# Patient Record
Sex: FEMALE | Race: WHITE | Employment: UNEMPLOYED | URBAN - METROPOLITAN AREA
[De-identification: names, ages, dates, MRNs, and addresses within clinical notes are randomized per-mention and may not be internally consistent; named-entity substitution may affect disease eponyms.]

---

## 2013-07-30 LAB — HIV AG/AB: NORMAL

## 2017-03-06 ENCOUNTER — OFFICE VISIT (OUTPATIENT)
Dept: FAMILY MEDICINE CLINIC | Age: 37
End: 2017-03-06

## 2017-03-06 VITALS
BODY MASS INDEX: 16.82 KG/M2 | RESPIRATION RATE: 16 BRPM | DIASTOLIC BLOOD PRESSURE: 74 MMHG | HEIGHT: 62 IN | HEART RATE: 68 BPM | OXYGEN SATURATION: 58 % | WEIGHT: 91.4 LBS | SYSTOLIC BLOOD PRESSURE: 122 MMHG

## 2017-03-06 DIAGNOSIS — M62.838 MUSCLE SPASMS OF NECK: ICD-10-CM

## 2017-03-06 DIAGNOSIS — J30.1 SEASONAL ALLERGIC RHINITIS DUE TO POLLEN: ICD-10-CM

## 2017-03-06 DIAGNOSIS — M62.830 MUSCLE SPASM OF BACK: ICD-10-CM

## 2017-03-06 DIAGNOSIS — F41.9 ANXIETY: Chronic | ICD-10-CM

## 2017-03-06 DIAGNOSIS — F51.04 PSYCHOPHYSIOLOGICAL INSOMNIA: ICD-10-CM

## 2017-03-06 DIAGNOSIS — M54.50 CHRONIC MIDLINE LOW BACK PAIN WITHOUT SCIATICA: ICD-10-CM

## 2017-03-06 DIAGNOSIS — E06.3 CHRONIC LYMPHOCYTIC THYROIDITIS: Chronic | ICD-10-CM

## 2017-03-06 DIAGNOSIS — G89.29 CHRONIC MIDLINE LOW BACK PAIN WITHOUT SCIATICA: ICD-10-CM

## 2017-03-06 DIAGNOSIS — S16.1XXS CERVICAL STRAIN, SEQUELA: ICD-10-CM

## 2017-03-06 LAB — TSH REFLEX: 0.45 UIU/ML (ref 0.27–4.2)

## 2017-03-06 PROCEDURE — 36415 COLL VENOUS BLD VENIPUNCTURE: CPT | Performed by: FAMILY MEDICINE

## 2017-03-06 PROCEDURE — 99213 OFFICE O/P EST LOW 20 MIN: CPT | Performed by: FAMILY MEDICINE

## 2017-03-06 RX ORDER — FLUTICASONE PROPIONATE 50 MCG
SPRAY, SUSPENSION (ML) NASAL
Qty: 1 BOTTLE | Refills: 5 | Status: SHIPPED | OUTPATIENT
Start: 2017-03-06 | End: 2018-02-12 | Stop reason: SDUPTHER

## 2017-03-06 RX ORDER — METHOCARBAMOL 500 MG/1
500 TABLET, FILM COATED ORAL 4 TIMES DAILY PRN
Qty: 40 TABLET | Refills: 0 | Status: SHIPPED | OUTPATIENT
Start: 2017-03-06 | End: 2018-07-31

## 2017-03-06 RX ORDER — ZOLPIDEM TARTRATE 10 MG/1
10 TABLET ORAL NIGHTLY PRN
Qty: 30 TABLET | Refills: 1 | Status: SHIPPED | OUTPATIENT
Start: 2017-03-06 | End: 2017-05-17 | Stop reason: SDUPTHER

## 2017-03-06 RX ORDER — TRAMADOL HYDROCHLORIDE 50 MG/1
50-100 TABLET ORAL EVERY 6 HOURS PRN
Qty: 240 TABLET | Refills: 2 | Status: SHIPPED | OUTPATIENT
Start: 2017-03-06 | End: 2017-05-31 | Stop reason: SDUPTHER

## 2017-03-06 RX ORDER — LEVOTHYROXINE SODIUM 88 UG/1
TABLET ORAL
Qty: 30 TABLET | Refills: 5 | Status: SHIPPED | OUTPATIENT
Start: 2017-03-06 | End: 2017-04-28 | Stop reason: SDUPTHER

## 2017-03-06 RX ORDER — MELOXICAM 15 MG/1
15 TABLET ORAL DAILY
Qty: 30 TABLET | Refills: 2 | Status: SHIPPED | OUTPATIENT
Start: 2017-03-06 | End: 2018-07-31

## 2017-03-06 RX ORDER — ALPRAZOLAM 0.5 MG/1
TABLET ORAL
Qty: 30 TABLET | Refills: 1 | Status: SHIPPED | OUTPATIENT
Start: 2017-03-06 | End: 2017-05-31 | Stop reason: SDUPTHER

## 2017-03-06 RX ORDER — CYCLOBENZAPRINE HCL 5 MG
TABLET ORAL
Qty: 30 TABLET | Refills: 2 | Status: SHIPPED | OUTPATIENT
Start: 2017-03-06 | End: 2018-07-31

## 2017-04-28 DIAGNOSIS — E06.3 CHRONIC LYMPHOCYTIC THYROIDITIS: Chronic | ICD-10-CM

## 2017-04-28 RX ORDER — LEVOTHYROXINE SODIUM 88 UG/1
TABLET ORAL
Qty: 30 TABLET | Refills: 3 | Status: SHIPPED | OUTPATIENT
Start: 2017-04-28 | End: 2017-05-17 | Stop reason: SDUPTHER

## 2017-05-03 ENCOUNTER — TELEPHONE (OUTPATIENT)
Dept: FAMILY MEDICINE CLINIC | Age: 37
End: 2017-05-03

## 2017-05-17 DIAGNOSIS — F51.04 PSYCHOPHYSIOLOGICAL INSOMNIA: ICD-10-CM

## 2017-05-17 DIAGNOSIS — E06.3 CHRONIC LYMPHOCYTIC THYROIDITIS: Chronic | ICD-10-CM

## 2017-05-17 RX ORDER — ZOLPIDEM TARTRATE 10 MG/1
TABLET ORAL
Qty: 30 TABLET | Refills: 0 | Status: SHIPPED | OUTPATIENT
Start: 2017-05-17 | End: 2017-05-31 | Stop reason: SDUPTHER

## 2017-05-17 RX ORDER — LEVOTHYROXINE SODIUM 88 UG/1
TABLET ORAL
Qty: 30 TABLET | Refills: 0 | Status: SHIPPED | OUTPATIENT
Start: 2017-05-17 | End: 2017-05-31 | Stop reason: SDUPTHER

## 2017-05-31 ENCOUNTER — OFFICE VISIT (OUTPATIENT)
Dept: FAMILY MEDICINE CLINIC | Age: 37
End: 2017-05-31

## 2017-05-31 VITALS
DIASTOLIC BLOOD PRESSURE: 70 MMHG | WEIGHT: 90.4 LBS | HEART RATE: 84 BPM | BODY MASS INDEX: 16.63 KG/M2 | HEIGHT: 62 IN | SYSTOLIC BLOOD PRESSURE: 102 MMHG | RESPIRATION RATE: 16 BRPM | OXYGEN SATURATION: 98 %

## 2017-05-31 DIAGNOSIS — E06.3 CHRONIC LYMPHOCYTIC THYROIDITIS: Chronic | ICD-10-CM

## 2017-05-31 DIAGNOSIS — F51.04 PSYCHOPHYSIOLOGICAL INSOMNIA: ICD-10-CM

## 2017-05-31 DIAGNOSIS — Z79.899 LONG-TERM USE OF HIGH-RISK MEDICATION: ICD-10-CM

## 2017-05-31 DIAGNOSIS — F41.9 ANXIETY: Chronic | ICD-10-CM

## 2017-05-31 DIAGNOSIS — S16.1XXS CERVICAL STRAIN, SEQUELA: ICD-10-CM

## 2017-05-31 DIAGNOSIS — Z13.220 SCREENING, LIPID: ICD-10-CM

## 2017-05-31 DIAGNOSIS — M54.50 CHRONIC MIDLINE LOW BACK PAIN WITHOUT SCIATICA: ICD-10-CM

## 2017-05-31 DIAGNOSIS — M62.838 MUSCLE SPASMS OF NECK: ICD-10-CM

## 2017-05-31 DIAGNOSIS — Z00.00 WELL ADULT HEALTH CHECK: Primary | ICD-10-CM

## 2017-05-31 DIAGNOSIS — E55.9 VITAMIN D DEFICIENCY: ICD-10-CM

## 2017-05-31 DIAGNOSIS — M62.830 MUSCLE SPASM OF BACK: ICD-10-CM

## 2017-05-31 DIAGNOSIS — G89.29 CHRONIC MIDLINE LOW BACK PAIN WITHOUT SCIATICA: ICD-10-CM

## 2017-05-31 LAB
CHOLESTEROL, TOTAL: 191 MG/DL (ref 0–199)
HDLC SERPL-MCNC: 85 MG/DL (ref 40–60)
LDL CHOLESTEROL CALCULATED: 96 MG/DL
TRIGL SERPL-MCNC: 50 MG/DL (ref 0–150)
VITAMIN D 25-HYDROXY: 40.2 NG/ML
VLDLC SERPL CALC-MCNC: 10 MG/DL

## 2017-05-31 PROCEDURE — 36415 COLL VENOUS BLD VENIPUNCTURE: CPT | Performed by: FAMILY MEDICINE

## 2017-05-31 PROCEDURE — 99395 PREV VISIT EST AGE 18-39: CPT | Performed by: FAMILY MEDICINE

## 2017-05-31 RX ORDER — ALPRAZOLAM 0.5 MG/1
TABLET ORAL
Qty: 30 TABLET | Refills: 1 | Status: SHIPPED | OUTPATIENT
Start: 2017-05-31 | End: 2017-07-23 | Stop reason: SDUPTHER

## 2017-05-31 RX ORDER — ZOLPIDEM TARTRATE 10 MG/1
TABLET ORAL
Qty: 30 TABLET | Refills: 5 | Status: SHIPPED | OUTPATIENT
Start: 2017-05-31 | End: 2017-11-17 | Stop reason: SDUPTHER

## 2017-05-31 RX ORDER — LEVOTHYROXINE SODIUM 88 UG/1
TABLET ORAL
Qty: 30 TABLET | Refills: 2 | Status: SHIPPED | OUTPATIENT
Start: 2017-05-31 | End: 2017-08-25 | Stop reason: SDUPTHER

## 2017-05-31 RX ORDER — TRAMADOL HYDROCHLORIDE 50 MG/1
50-100 TABLET ORAL EVERY 6 HOURS PRN
Qty: 240 TABLET | Refills: 2 | Status: SHIPPED | OUTPATIENT
Start: 2017-05-31 | End: 2017-08-25 | Stop reason: SDUPTHER

## 2017-05-31 ASSESSMENT — PATIENT HEALTH QUESTIONNAIRE - PHQ9
1. LITTLE INTEREST OR PLEASURE IN DOING THINGS: 0
SUM OF ALL RESPONSES TO PHQ9 QUESTIONS 1 & 2: 0
SUM OF ALL RESPONSES TO PHQ QUESTIONS 1-9: 0
2. FEELING DOWN, DEPRESSED OR HOPELESS: 0

## 2017-06-05 ENCOUNTER — NURSE ONLY (OUTPATIENT)
Dept: FAMILY MEDICINE CLINIC | Age: 37
End: 2017-06-05

## 2017-06-05 DIAGNOSIS — Z79.899 LONG-TERM USE OF HIGH-RISK MEDICATION: Primary | ICD-10-CM

## 2017-06-05 LAB
AMPHETAMINE SCREEN, URINE: ABNORMAL
BARBITURATE SCREEN, URINE: ABNORMAL
BENZODIAZEPINE SCREEN, URINE: ABNORMAL
COCAINE METABOLITE SCREEN URINE: ABNORMAL
MDMA URINE: ABNORMAL
METHADONE SCREEN, URINE: ABNORMAL
METHAMPHETAMINE, URINE: ABNORMAL
OPIATE SCREEN URINE: ABNORMAL
OXYCODONE SCREEN URINE: ABNORMAL
PHENCYCLIDINE SCREEN URINE: ABNORMAL
PROPOXYPHENE SCREEN, URINE: ABNORMAL
THC: ABNORMAL
TRICYCLIC ANTIDEPRESSANTS, UR: ABNORMAL

## 2017-06-05 PROCEDURE — 80305 DRUG TEST PRSMV DIR OPT OBS: CPT | Performed by: FAMILY MEDICINE

## 2017-07-23 DIAGNOSIS — F41.9 ANXIETY: Chronic | ICD-10-CM

## 2017-07-24 RX ORDER — ALPRAZOLAM 0.5 MG/1
TABLET ORAL
Qty: 30 TABLET | Refills: 0 | Status: SHIPPED | OUTPATIENT
Start: 2017-07-24 | End: 2017-08-25 | Stop reason: SDUPTHER

## 2017-08-25 ENCOUNTER — OFFICE VISIT (OUTPATIENT)
Dept: FAMILY MEDICINE CLINIC | Age: 37
End: 2017-08-25

## 2017-08-25 VITALS
OXYGEN SATURATION: 98 % | WEIGHT: 93.4 LBS | RESPIRATION RATE: 16 BRPM | HEIGHT: 62 IN | BODY MASS INDEX: 17.19 KG/M2 | HEART RATE: 95 BPM | DIASTOLIC BLOOD PRESSURE: 64 MMHG | TEMPERATURE: 97.9 F | SYSTOLIC BLOOD PRESSURE: 108 MMHG

## 2017-08-25 DIAGNOSIS — F41.9 ANXIETY: Primary | Chronic | ICD-10-CM

## 2017-08-25 DIAGNOSIS — G89.29 CHRONIC MIDLINE LOW BACK PAIN WITHOUT SCIATICA: ICD-10-CM

## 2017-08-25 DIAGNOSIS — R19.7 DIARRHEA OF PRESUMED INFECTIOUS ORIGIN: ICD-10-CM

## 2017-08-25 DIAGNOSIS — E06.3 CHRONIC LYMPHOCYTIC THYROIDITIS: Chronic | ICD-10-CM

## 2017-08-25 DIAGNOSIS — M62.838 MUSCLE SPASMS OF NECK: ICD-10-CM

## 2017-08-25 DIAGNOSIS — M54.50 CHRONIC MIDLINE LOW BACK PAIN WITHOUT SCIATICA: ICD-10-CM

## 2017-08-25 DIAGNOSIS — M62.830 MUSCLE SPASM OF BACK: ICD-10-CM

## 2017-08-25 DIAGNOSIS — S16.1XXS CERVICAL STRAIN, SEQUELA: ICD-10-CM

## 2017-08-25 LAB
AMPHETAMINE SCREEN, URINE: NORMAL
BARBITURATE SCREEN, URINE: NORMAL
BENZODIAZEPINE SCREEN, URINE: NORMAL
COCAINE METABOLITE SCREEN URINE: NORMAL
MDMA URINE: NORMAL
METHADONE SCREEN, URINE: NORMAL
METHAMPHETAMINE, URINE: NORMAL
OPIATE SCREEN URINE: NORMAL
OXYCODONE SCREEN URINE: NORMAL
PHENCYCLIDINE SCREEN URINE: NORMAL
PROPOXYPHENE SCREEN, URINE: NORMAL
THC: NORMAL
TRICYCLIC ANTIDEPRESSANTS, UR: NORMAL

## 2017-08-25 PROCEDURE — 80305 DRUG TEST PRSMV DIR OPT OBS: CPT | Performed by: FAMILY MEDICINE

## 2017-08-25 PROCEDURE — 99214 OFFICE O/P EST MOD 30 MIN: CPT | Performed by: FAMILY MEDICINE

## 2017-08-25 RX ORDER — LEVOTHYROXINE SODIUM 88 UG/1
TABLET ORAL
Qty: 30 TABLET | Refills: 2 | Status: SHIPPED | OUTPATIENT
Start: 2017-08-25 | End: 2017-10-16 | Stop reason: SDUPTHER

## 2017-08-25 RX ORDER — ALPRAZOLAM 0.5 MG/1
TABLET ORAL
Qty: 30 TABLET | Refills: 2 | Status: SHIPPED | OUTPATIENT
Start: 2017-08-25 | End: 2017-11-17 | Stop reason: SDUPTHER

## 2017-08-25 RX ORDER — TRAMADOL HYDROCHLORIDE 50 MG/1
50-100 TABLET ORAL EVERY 6 HOURS PRN
Qty: 240 TABLET | Refills: 2 | Status: SHIPPED | OUTPATIENT
Start: 2017-08-25 | End: 2017-11-17 | Stop reason: SDUPTHER

## 2017-08-25 RX ORDER — ALPRAZOLAM 0.5 MG/1
TABLET ORAL
Qty: 30 TABLET | Refills: 0 | Status: SHIPPED | OUTPATIENT
Start: 2017-08-25 | End: 2017-08-25

## 2017-10-02 ENCOUNTER — TELEPHONE (OUTPATIENT)
Dept: FAMILY MEDICINE CLINIC | Age: 37
End: 2017-10-02

## 2017-10-11 ENCOUNTER — TELEPHONE (OUTPATIENT)
Dept: FAMILY MEDICINE CLINIC | Age: 37
End: 2017-10-11

## 2017-10-11 DIAGNOSIS — Z78.9 HISTORY OF MEASLES, MUMPS, RUBELLA (MMR) VACCINATION UNKNOWN: Primary | ICD-10-CM

## 2017-10-12 ENCOUNTER — NURSE ONLY (OUTPATIENT)
Dept: FAMILY MEDICINE CLINIC | Age: 37
End: 2017-10-12

## 2017-10-12 DIAGNOSIS — Z78.9 HISTORY OF MEASLES, MUMPS, RUBELLA (MMR) VACCINATION UNKNOWN: ICD-10-CM

## 2017-10-12 LAB — RUBELLA ANTIBODY IGG: 44 IU/ML

## 2017-10-12 NOTE — TELEPHONE ENCOUNTER
If her form says she had an MMR titer drawn and we are waiting for it to come back I will sign that it was drawn. If her form is a history and physical form then we can date with the date she had her history and physical on 5/31/2017. If her form is a preop physical she will need to be seen. 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 Also if the form requires information we don't have such as childhood immunization history she will need to get that for us. If the form is complicated and requires multiple questions that she needs to answer then she will need to be seen. So we need to know what the form is for.

## 2017-10-14 LAB
MUV IGG SER QL: 14.1 AU/ML
RUBEOLA (MEASLES) AB IGG: 13.7 AU/ML

## 2017-10-16 ENCOUNTER — TELEPHONE (OUTPATIENT)
Dept: FAMILY MEDICINE CLINIC | Age: 37
End: 2017-10-16

## 2017-10-16 DIAGNOSIS — E06.3 CHRONIC LYMPHOCYTIC THYROIDITIS: Chronic | ICD-10-CM

## 2017-10-16 RX ORDER — LEVOTHYROXINE SODIUM 88 UG/1
TABLET ORAL
Qty: 30 TABLET | Refills: 1 | Status: SHIPPED | OUTPATIENT
Start: 2017-10-16 | End: 2018-03-12 | Stop reason: SDUPTHER

## 2017-10-16 NOTE — TELEPHONE ENCOUNTER
Pt is calling to get her test results.   She has a form that needs to be filled out    Please fill out and call pt

## 2017-10-18 ENCOUNTER — NURSE ONLY (OUTPATIENT)
Dept: FAMILY MEDICINE CLINIC | Age: 37
End: 2017-10-18

## 2017-10-18 DIAGNOSIS — Z23 NEED FOR MMR VACCINE: Primary | ICD-10-CM

## 2017-10-18 PROCEDURE — 90707 MMR VACCINE SC: CPT | Performed by: FAMILY MEDICINE

## 2017-10-18 PROCEDURE — 90471 IMMUNIZATION ADMIN: CPT | Performed by: FAMILY MEDICINE

## 2017-11-17 ENCOUNTER — OFFICE VISIT (OUTPATIENT)
Dept: FAMILY MEDICINE CLINIC | Age: 37
End: 2017-11-17

## 2017-11-17 VITALS
BODY MASS INDEX: 17.78 KG/M2 | HEART RATE: 84 BPM | DIASTOLIC BLOOD PRESSURE: 62 MMHG | HEIGHT: 62 IN | OXYGEN SATURATION: 98 % | WEIGHT: 96.6 LBS | SYSTOLIC BLOOD PRESSURE: 106 MMHG | RESPIRATION RATE: 16 BRPM

## 2017-11-17 DIAGNOSIS — S16.1XXS CERVICAL STRAIN, SEQUELA: ICD-10-CM

## 2017-11-17 DIAGNOSIS — E06.3 CHRONIC LYMPHOCYTIC THYROIDITIS: Chronic | ICD-10-CM

## 2017-11-17 DIAGNOSIS — M62.838 MUSCLE SPASMS OF NECK: Chronic | ICD-10-CM

## 2017-11-17 DIAGNOSIS — M54.50 CHRONIC MIDLINE LOW BACK PAIN WITHOUT SCIATICA: Primary | ICD-10-CM

## 2017-11-17 DIAGNOSIS — M62.830 MUSCLE SPASM OF BACK: ICD-10-CM

## 2017-11-17 DIAGNOSIS — G89.29 CHRONIC MIDLINE LOW BACK PAIN WITHOUT SCIATICA: Primary | ICD-10-CM

## 2017-11-17 DIAGNOSIS — F51.04 PSYCHOPHYSIOLOGICAL INSOMNIA: ICD-10-CM

## 2017-11-17 DIAGNOSIS — F41.9 ANXIETY: Chronic | ICD-10-CM

## 2017-11-17 DIAGNOSIS — Z79.899 LONG-TERM USE OF HIGH-RISK MEDICATION: ICD-10-CM

## 2017-11-17 PROCEDURE — 80305 DRUG TEST PRSMV DIR OPT OBS: CPT | Performed by: FAMILY MEDICINE

## 2017-11-17 PROCEDURE — 99214 OFFICE O/P EST MOD 30 MIN: CPT | Performed by: FAMILY MEDICINE

## 2017-11-17 RX ORDER — ALPRAZOLAM 0.5 MG/1
TABLET ORAL
Qty: 30 TABLET | Refills: 2 | Status: SHIPPED | OUTPATIENT
Start: 2017-11-17 | End: 2018-02-16 | Stop reason: SDUPTHER

## 2017-11-17 RX ORDER — ZOLPIDEM TARTRATE 10 MG/1
TABLET ORAL
Qty: 30 TABLET | Refills: 5 | Status: SHIPPED | OUTPATIENT
Start: 2017-11-17 | End: 2018-05-01 | Stop reason: SDUPTHER

## 2017-11-17 RX ORDER — TRAMADOL HYDROCHLORIDE 50 MG/1
50-100 TABLET ORAL EVERY 6 HOURS PRN
Qty: 220 TABLET | Refills: 2 | Status: SHIPPED | OUTPATIENT
Start: 2017-11-17 | End: 2018-02-16 | Stop reason: SDUPTHER

## 2017-11-17 NOTE — PROGRESS NOTES
Exam reveals no gallop, no S3, no S4, no distant heart sounds and no friction rub. No murmur heard. Pulmonary/Chest: Effort normal and breath sounds normal. No respiratory distress. She has no decreased breath sounds. She has no wheezes. She has no rhonchi. She has no rales. Abdominal: Soft. She exhibits no distension, no pulsatile midline mass and no mass. There is no hepatosplenomegaly. There is tenderness in the epigastric area. There is no rigidity, no rebound, no guarding, no CVA tenderness, no tenderness at McBurney's point and negative Mccormack's sign. Lymphadenopathy:        Head (right side): No submandibular and no tonsillar adenopathy present. Head (left side): No submandibular and no tonsillar adenopathy present. She has no cervical adenopathy. Right cervical: No superficial cervical, no deep cervical and no posterior cervical adenopathy present. Left cervical: No superficial cervical, no deep cervical and no posterior cervical adenopathy present. Neurological: She is alert. Reflex Scores:       Patellar reflexes are 2+ on the right side and 2+ on the left side. Skin: Skin is warm and dry. She is not diaphoretic. No cyanosis. No pallor. Nails show no clubbing. Psychiatric: She has a normal mood and affect. Her speech is normal and behavior is normal. Judgment normal. Cognition and memory are normal.   Nursing note and vitals reviewed.     Vitals:    11/17/17 1549   BP: 106/62   Site: Right Arm   Position: Sitting   Cuff Size: Medium Adult   Pulse: 84   Resp: 16   SpO2: 98%   Weight: 96 lb 9.6 oz (43.8 kg)   Height: 5' 1.5\" (1.562 m)     BP Readings from Last 3 Encounters:   11/17/17 106/62   08/25/17 108/64   05/31/17 102/70     Pulse Readings from Last 3 Encounters:   11/17/17 84   08/25/17 95   05/31/17 84     Wt Readings from Last 3 Encounters:   11/17/17 96 lb 9.6 oz (43.8 kg)   08/25/17 93 lb 6.4 oz (42.4 kg)   05/31/17 90 lb 6.4 oz (41 kg)     Body mass index is sleeping and she wished to have them try it such as her spouse then she would need to call his doctor and get the okay and then call us and get the okay. After he tried a medicine he would then need to get the medicine from his doctor. She denies sharing this medicine with anyone. She doesn't understand why we are concerned that she says she is only using this medicine every other week and is using the alprazolam every other week and yet she needs a refill with the computer showing she is used up every 30 day refill we've given her.

## 2017-11-17 NOTE — PATIENT INSTRUCTIONS
Connie Tirado was seen today for anxiety. Diagnoses and all orders for this visit:    Muscle spasms of neck  -     traMADol (ULTRAM) 50 MG tablet; Take 1-2 tablets by mouth every 6 hours as needed for Pain . Anxiety  -     ALPRAZolam (XANAX) 0.5 MG tablet; TAKE 1 TABLET BY MOUTH NIGHTLY AS NEEDED FOR SLEEP OR ANXIETY FOR UP TO 30 DAYS. Muscle spasm of back  -     traMADol (ULTRAM) 50 MG tablet; Take 1-2 tablets by mouth every 6 hours as needed for Pain . Chronic lymphocytic thyroiditis  -     TSH with Reflex; Future    Cervical strain, sequela  -     traMADol (ULTRAM) 50 MG tablet; Take 1-2 tablets by mouth every 6 hours as needed for Pain . Chronic midline low back pain without sciatica  -     traMADol (ULTRAM) 50 MG tablet; Take 1-2 tablets by mouth every 6 hours as needed for Pain . Psychophysiological insomnia  -     zolpidem (AMBIEN) 10 MG tablet; TAKE 1 TABLET BY MOUTH EVERY DAY AT BEDTIME AS NEEDED FOR SLEEP.

## 2017-11-17 NOTE — LETTER
reduced coughing, which are especially dangerous for patients with lung disease. Overdose or dangerous interactions with alcohol and other medications may occur, leading to death. Hyperalgesia may develop, in which patients receiving opioids for the treatment of pain may actually become more sensitive to certain painful stimuli, and in some cases, experience pain from ordinarily non-painful stimuli. Women between the ages of 14-53 who could become pregnant should carefully weigh the risks and benefits of opioids with their physicians, as these medications increase the risk of pregnancy complications, including miscarriage,  delivery and stillbirth. It is also possible for babies to be born addicted to opioids. Opioid dependence withdrawal symptoms may include; feelings of uneasiness, increased pain, irritability, belly pain, diarrhea, sweats and goose-flesh. Benzodiazepines and non-benzodiazepine sleep medications: These medications can lead to problems such as addiction/dependence, sedation, fatigue, lightheadedness, dizziness, incoordination, falls, depression, hallucinations, and impaired judgment, memory and concentration. The ability to drive and operate machinery may also be affected. Abnormal sleep-related behaviors have been reported, including sleep walking, driving, making telephone calls, eating, or having sex while not fully awake. These medications can suppress breathing and worsen sleep apnea, particularly when combined with alcohol or other sedating medications, potentially leading to death. Dependence withdrawal symptoms may include tremors, anxiety, hallucinations and seizures. Stimulants:  Common adverse effects include addiction/dependence, increased blood pressure and heart rate, decreased appetite, nausea, involuntary weight loss, insomnia, irritability, and headaches.   These risks may increase when these medications are combined with other stimulants, such as caffeine pills or energy drinks, certain weight loss supplements and oral decongestants. Dependence withdrawal symptoms may include depressed mood, loss of interest, suicidal thoughts, anxiety, fatigue, appetite changes and agitation. Testosterone replacement therapy:  Potential side effects include increased risk of stroke and heart attack, blood clots, increased blood pressure, increased cholesterol, enlarged prostate, sleep apnea, irritability/aggression and other mood disorders, and decreased fertility. Other:     1. I understand that I have the following responsibilities:  · I will take medications at the dose and frequency prescribed. · I will not increase or change how I take my medications without the approval of the health care provider who signs this Medication Agreement. · I will arrange for refills at the prescribed interval ONLY during regular office hours. I will not ask for refills earlier than agreed, after-hours, on holidays or on weekends. · I will obtain all refills for these medications at  ·  [facility] [address], [address] [facility], _____[facility], [address] - F [phone] FOR AMBIEN__________________________  pharmacy (phone number  ·  ________________________), with full consent for my provider and pharmacist to exchange information in writing or verbally. · I will not request any pain medications or controlled substances from other providers and will inform this provider of all other medications I am taking. · I will inform my other health care providers that I am taking these medications and of the existence of this [facility]. In the event of an emergency, I will provide the same information to the emergency department providers. · I will protect my prescriptions and medications.  I understand that lost · I do not show any improvement in pain, or my activity has not improved. · I develop rapid tolerance or loss of improvement, as described in my treatment plan. · I develop significant side effects from the medication. · My behavior is inconsistent with the responsibilities outlined above, which may also result in my being prevented from receiving further care from this office. · Other:____________________________________________________________________    AGREEMENT:    I have read the above and have had all of my questions answered. For chronic disease management, I know that my symptoms can be managed with many types of treatments. A chronic medication trial may be part of my treatment, but I must be an active participant in my care. Medication therapy is only one part of my symptom management plan. In some cases, there may be limited scientific evidence to support the chronic use of certain medications to improve symptoms and daily function. Furthermore, in certain circumstances, there may be scientific information that suggests that use of chronic controlled substances may actually worsen my symptoms and increase my risk of unintentional death directly related to this medication therapy. I know that if my provider feels my risk from controlled medications is greater than my benefit, I will have my controlled substance medication(s) compassionately lowered or removed altogether. I agree to a controlled substance medication trial.      I further agree to allow this office to contact family or friends if there are concerns about my safety and use of the controlled medications. I have agreed to use the following medications above as instructed by my physician and as stated in this [facility] [address].      Patient Signature:  ______________________  Date:11/17/2017 or _____________    Provider Signature:______________________  Date:11/17/2017 or _____________

## 2018-02-12 DIAGNOSIS — M62.830 MUSCLE SPASM OF BACK: ICD-10-CM

## 2018-02-12 DIAGNOSIS — G89.29 CHRONIC MIDLINE LOW BACK PAIN WITHOUT SCIATICA: ICD-10-CM

## 2018-02-12 DIAGNOSIS — M54.50 CHRONIC MIDLINE LOW BACK PAIN WITHOUT SCIATICA: ICD-10-CM

## 2018-02-12 DIAGNOSIS — M62.838 MUSCLE SPASMS OF NECK: Chronic | ICD-10-CM

## 2018-02-12 DIAGNOSIS — S16.1XXS CERVICAL STRAIN, SEQUELA: ICD-10-CM

## 2018-02-12 DIAGNOSIS — J30.1 SEASONAL ALLERGIC RHINITIS DUE TO POLLEN: ICD-10-CM

## 2018-02-12 RX ORDER — TRAMADOL HYDROCHLORIDE 50 MG/1
TABLET ORAL
Qty: 220 TABLET | Refills: 0 | OUTPATIENT
Start: 2018-02-12

## 2018-02-12 RX ORDER — FLUTICASONE PROPIONATE 50 MCG
SPRAY, SUSPENSION (ML) NASAL
Qty: 16 G | Refills: 0 | Status: SHIPPED | OUTPATIENT
Start: 2018-02-12 | End: 2018-03-12 | Stop reason: SDUPTHER

## 2018-02-16 ENCOUNTER — OFFICE VISIT (OUTPATIENT)
Dept: FAMILY MEDICINE CLINIC | Age: 38
End: 2018-02-16

## 2018-02-16 VITALS
HEIGHT: 62 IN | HEART RATE: 112 BPM | DIASTOLIC BLOOD PRESSURE: 76 MMHG | BODY MASS INDEX: 18.26 KG/M2 | RESPIRATION RATE: 16 BRPM | SYSTOLIC BLOOD PRESSURE: 122 MMHG | OXYGEN SATURATION: 98 % | WEIGHT: 99.2 LBS

## 2018-02-16 DIAGNOSIS — M54.41 ACUTE MIDLINE LOW BACK PAIN WITH BILATERAL SCIATICA: Primary | ICD-10-CM

## 2018-02-16 DIAGNOSIS — E06.3 CHRONIC LYMPHOCYTIC THYROIDITIS: Chronic | ICD-10-CM

## 2018-02-16 DIAGNOSIS — M54.42 ACUTE MIDLINE LOW BACK PAIN WITH BILATERAL SCIATICA: Primary | ICD-10-CM

## 2018-02-16 DIAGNOSIS — M62.830 MUSCLE SPASM OF BACK: ICD-10-CM

## 2018-02-16 DIAGNOSIS — F41.9 ANXIETY: Chronic | ICD-10-CM

## 2018-02-16 DIAGNOSIS — M62.838 MUSCLE SPASMS OF NECK: Chronic | ICD-10-CM

## 2018-02-16 DIAGNOSIS — S16.1XXS CERVICAL STRAIN, SEQUELA: ICD-10-CM

## 2018-02-16 DIAGNOSIS — Z79.899 LONG-TERM USE OF HIGH-RISK MEDICATION: ICD-10-CM

## 2018-02-16 LAB
AMPHETAMINE SCREEN, URINE: NORMAL
BARBITURATE SCREEN, URINE: NORMAL
BENZODIAZEPINE SCREEN, URINE: NORMAL
COCAINE METABOLITE SCREEN URINE: NORMAL
MDMA URINE: NORMAL
METHADONE SCREEN, URINE: NORMAL
METHAMPHETAMINE, URINE: NORMAL
OPIATE SCREEN URINE: NORMAL
OXYCODONE SCREEN URINE: NORMAL
PHENCYCLIDINE SCREEN URINE: NORMAL
PROPOXYPHENE SCREEN, URINE: NORMAL
T3 TOTAL: 1.04 NG/ML (ref 0.8–2)
T4 FREE: 1.4 NG/DL (ref 0.9–1.8)
THC: NORMAL
TRICYCLIC ANTIDEPRESSANTS, UR: NORMAL
TSH REFLEX: 0.18 UIU/ML (ref 0.27–4.2)

## 2018-02-16 PROCEDURE — 99215 OFFICE O/P EST HI 40 MIN: CPT | Performed by: FAMILY MEDICINE

## 2018-02-16 PROCEDURE — 80305 DRUG TEST PRSMV DIR OPT OBS: CPT | Performed by: FAMILY MEDICINE

## 2018-02-16 RX ORDER — ALPRAZOLAM 0.5 MG/1
TABLET ORAL
Qty: 30 TABLET | Refills: 2 | Status: SHIPPED | OUTPATIENT
Start: 2018-02-16 | End: 2018-05-01 | Stop reason: SDUPTHER

## 2018-02-16 RX ORDER — TRAMADOL HYDROCHLORIDE 50 MG/1
50-100 TABLET ORAL EVERY 6 HOURS PRN
Qty: 215 TABLET | Refills: 2 | Status: SHIPPED | OUTPATIENT
Start: 2018-02-16 | End: 2018-05-01 | Stop reason: SDUPTHER

## 2018-02-16 ASSESSMENT — ENCOUNTER SYMPTOMS: CONSTIPATION: 1

## 2018-02-16 NOTE — PROGRESS NOTES
Counseling More Than 50% of the 40 min Appointment Time     Luis Terry was seen today for thyroid problem and back pain. Diagnoses and all orders for this visit:    Acute midline low back pain with bilateral sciatica  -     Kelli Liao MD (Inpatient and Outpatient EMG). Explained to the patient what this process was and what was looking for and what it would show  primarily a pinched nerve. Range of motion and knee to chest do every 1-2 hours. Moist heat  Sports cream (Aspercreme doesn't smell). Tylenol 500 mg up to 2 tabs 3x/day as needed. Aleve 220 1-2 twice daily with food. If diagnosed with arthritis in the past:  Glucosamine 1500 mg/ chondroitin 1200 mg once daily or any combination equaling that dose i.e. 750/600 mg twice daily or 500/400 mg three time daily. This is a daily joint/arthritis supplement without significant side effects. B complex vitamin once daily after the EMG. Explained Xanax is not to be used for muscle aches. We'll not increase Xanax to take it for muscle aches and spasms. We need to get off the underlying problem and treat that and not look for stronger controlled substances to hide symptoms. Controlled Substances Monitoring: The Prescription Monitoring Report for this patient was reviewed today. Richard Ackerman DO)  Possible medication side effects, risk of tolerance/dependence & alternative treatments discussed. , Random urine drug screen sent today., No signs of potential drug abuse or diversion identified. Richard Ackerman DO)  Treatment objectives documented - patient is progressing appropriately. , Reviewed the patient's functional status and documentation. Richard Ackerman DO)  Medication contract signed today. Richard Ackerman DO)    Chronic lymphocytic thyroiditis  -     TSH with Reflex; Standing  -     Good control.  -     Continue meds and lifestyle control. Anxiety  Fair control. If needing more medication will will refer to psychiatry.   -     ALPRAZolam

## 2018-02-16 NOTE — LETTER
and sex drive. They may cause problems with breathing, sleep apnea and reduced coughing, which are especially dangerous for patients with lung disease. Overdose or dangerous interactions with alcohol and other medications may occur, leading to death. Hyperalgesia may develop, in which patients receiving opioids for the treatment of pain may actually become more sensitive to certain painful stimuli, and in some cases, experience pain from ordinarily non-painful stimuli. Women between the ages of 14-53 who could become pregnant should carefully weigh the risks and benefits of opioids with their physicians, as these medications increase the risk of pregnancy complications, including miscarriage,  delivery and stillbirth. It is also possible for babies to be born addicted to opioids. Opioid dependence withdrawal symptoms may include; feelings of uneasiness, increased pain, irritability, belly pain, diarrhea, sweats and goose-flesh. Benzodiazepines and non-benzodiazepine sleep medications: These medications can lead to problems such as addiction/dependence, sedation, fatigue, lightheadedness, dizziness, incoordination, falls, depression, hallucinations, and impaired judgment, memory and concentration. The ability to drive and operate machinery may also be affected. Abnormal sleep-related behaviors have been reported, including sleep walking, driving, making telephone calls, eating, or having sex while not fully awake. These medications can suppress breathing and worsen sleep apnea, particularly when combined with alcohol or other sedating medications, potentially leading to death. Dependence withdrawal symptoms may include tremors, anxiety, hallucinations and seizures. Stimulants:  Common adverse effects include addiction/dependence, increased blood pressure and heart rate, decreased appetite, nausea, involuntary weight loss, insomnia, irritability, and headaches.   These risks may increase when these medications are combined with other stimulants, such as caffeine pills or energy drinks, certain weight loss supplements and oral decongestants. Dependence withdrawal symptoms may include depressed mood, loss of interest, suicidal thoughts, anxiety, fatigue, appetite changes and agitation. Testosterone replacement therapy:  Potential side effects include increased risk of stroke and heart attack, blood clots, increased blood pressure, increased cholesterol, enlarged prostate, sleep apnea, irritability/aggression and other mood disorders, and decreased fertility. Other:     1. I understand that I have the following responsibilities:  · I will take medications at the dose and frequency prescribed. · I will not increase or change how I take my medications without the approval of the health care provider who signs this Medication Agreement. · I will arrange for refills at the prescribed interval ONLY during regular office hours. I will not ask for refills earlier than agreed, after-hours, on holidays or on weekends. · I will obtain all refills for these medications at  ·  ____________________________________  pharmacy (phone number  ·  ________________________), with full consent for my provider and pharmacist to exchange information in writing or verbally. · I will not request any pain medications or controlled substances from other providers and will inform this provider of all other medications I am taking. · I will inform my other health care providers that I am taking these medications and of the existence of this [facility] [address]. In the event of an emergency, I will provide the same information to the emergency department providers. · I will protect my prescriptions and medications. I understand that lost or misplaced prescriptions will not be replaced.   · I will keep medications only for my own use and will not share them with · I develop significant side effects from the medication. · My behavior is inconsistent with the responsibilities outlined above, which may also result in my being prevented from receiving further care from this office. · Other:____________________________________________________________________    AGREEMENT:    I have read the above and have had all of my questions answered. For chronic disease management, I know that my symptoms can be managed with many types of treatments. A chronic medication trial may be part of my treatment, but I must be an active participant in my care. Medication therapy is only one part of my symptom management plan. In some cases, there may be limited scientific evidence to support the chronic use of certain medications to improve symptoms and daily function. Furthermore, in certain circumstances, there may be scientific information that suggests that use of chronic controlled substances may actually worsen my symptoms and increase my risk of unintentional death directly related to this medication therapy. I know that if my provider feels my risk from controlled medications is greater than my benefit, I will have my controlled substance medication(s) compassionately lowered or removed altogether. I agree to a controlled substance medication trial.      I further agree to allow this office to contact family or friends if there are concerns about my safety and use of the controlled medications. I have agreed to use the following medications above as instructed by my physician and as stated in this [facility] [address].      Patient Signature:  ______________________  Date:2/16/2018 or _____________    Provider Signature:______________________  Date:2/16/2018 or _____________

## 2018-03-12 ENCOUNTER — TELEPHONE (OUTPATIENT)
Dept: FAMILY MEDICINE CLINIC | Age: 38
End: 2018-03-12

## 2018-03-12 DIAGNOSIS — J30.1 SEASONAL ALLERGIC RHINITIS DUE TO POLLEN: ICD-10-CM

## 2018-03-12 DIAGNOSIS — E06.3 CHRONIC LYMPHOCYTIC THYROIDITIS: Chronic | ICD-10-CM

## 2018-03-13 RX ORDER — LEVOTHYROXINE SODIUM 88 UG/1
TABLET ORAL
Qty: 30 TABLET | Refills: 1 | Status: SHIPPED | OUTPATIENT
Start: 2018-03-13 | End: 2018-05-01 | Stop reason: SDUPTHER

## 2018-03-13 RX ORDER — FLUTICASONE PROPIONATE 50 MCG
SPRAY, SUSPENSION (ML) NASAL
Qty: 1 BOTTLE | Refills: 11 | Status: SHIPPED | OUTPATIENT
Start: 2018-03-13 | End: 2018-11-27 | Stop reason: SDUPTHER

## 2018-03-15 ENCOUNTER — PROCEDURE VISIT (OUTPATIENT)
Dept: NEUROLOGY | Age: 38
End: 2018-03-15

## 2018-03-15 DIAGNOSIS — M79.605 BILATERAL LEG PAIN: Primary | ICD-10-CM

## 2018-03-15 DIAGNOSIS — M79.604 BILATERAL LEG PAIN: Primary | ICD-10-CM

## 2018-03-15 PROCEDURE — 95909 NRV CNDJ TST 5-6 STUDIES: CPT | Performed by: PSYCHIATRY & NEUROLOGY

## 2018-03-15 PROCEDURE — 95886 MUSC TEST DONE W/N TEST COMP: CPT | Performed by: PSYCHIATRY & NEUROLOGY

## 2018-03-15 NOTE — PROGRESS NOTES
Smiley Mckinnon M.D. [facility]) Physicians/[facility]  Board Certified in [address] [facility] [address] [place] [address], [address] [facility] [address] [facility]    EMG / NERVE CONDUCTION STUDY    PATIENT:     Ivonne See OF EMG:    3/15/2018    YOB: 1980       REASON FOR EMG:   Low back pain and bilateral leg pain, rule out lumbar radiculopathy     REFERRING PHYSICIAN:  DO Margarette Hubbard 15  [facility], [address]    SUMMARY:  Bilateral peroneal motor nerve studies were normal.  Bilateral posterior tibial motor nerve studies were normal.  Bilateral sural sensory nerve studies were normal.  Needle EMG of several muscles in both lower extremities was normal.  Needle EMG of bilateral lumbar paraspinal muscles was normal.     CLINICAL DIAGNOSIS:  Lumbar radiculopathy     EMG RESULTS:   This is a normal EMG and nerve conduction study of both lower extremities. There is no electrophysiological evidence for lumbar radiculopathy or peripheral neuropathy in this study. _____________________________  Smiley Mckinnon M.D.   Electromyographer/Neurologist

## 2018-04-02 ENCOUNTER — TELEPHONE (OUTPATIENT)
Dept: FAMILY MEDICINE CLINIC | Age: 38
End: 2018-04-02

## 2018-04-05 ENCOUNTER — TELEPHONE (OUTPATIENT)
Dept: FAMILY MEDICINE CLINIC | Age: 38
End: 2018-04-05

## 2018-05-01 ENCOUNTER — OFFICE VISIT (OUTPATIENT)
Dept: FAMILY MEDICINE CLINIC | Age: 38
End: 2018-05-01

## 2018-05-01 VITALS
HEIGHT: 62 IN | OXYGEN SATURATION: 98 % | DIASTOLIC BLOOD PRESSURE: 74 MMHG | SYSTOLIC BLOOD PRESSURE: 108 MMHG | HEART RATE: 80 BPM | BODY MASS INDEX: 18.62 KG/M2 | RESPIRATION RATE: 18 BRPM | WEIGHT: 101.2 LBS

## 2018-05-01 DIAGNOSIS — M62.830 MUSCLE SPASM OF BACK: ICD-10-CM

## 2018-05-01 DIAGNOSIS — F51.04 PSYCHOPHYSIOLOGICAL INSOMNIA: ICD-10-CM

## 2018-05-01 DIAGNOSIS — M62.838 MUSCLE SPASMS OF NECK: Chronic | ICD-10-CM

## 2018-05-01 DIAGNOSIS — S16.1XXS CERVICAL STRAIN, SEQUELA: ICD-10-CM

## 2018-05-01 DIAGNOSIS — M79.18 BUTTOCK PAIN: ICD-10-CM

## 2018-05-01 DIAGNOSIS — G89.29 CHRONIC MIDLINE LOW BACK PAIN WITH BILATERAL SCIATICA: ICD-10-CM

## 2018-05-01 DIAGNOSIS — M54.42 CHRONIC MIDLINE LOW BACK PAIN WITH BILATERAL SCIATICA: ICD-10-CM

## 2018-05-01 DIAGNOSIS — E06.3 CHRONIC LYMPHOCYTIC THYROIDITIS: Primary | Chronic | ICD-10-CM

## 2018-05-01 DIAGNOSIS — F41.9 ANXIETY: Chronic | ICD-10-CM

## 2018-05-01 DIAGNOSIS — M54.41 CHRONIC MIDLINE LOW BACK PAIN WITH BILATERAL SCIATICA: ICD-10-CM

## 2018-05-01 PROCEDURE — 99214 OFFICE O/P EST MOD 30 MIN: CPT | Performed by: FAMILY MEDICINE

## 2018-05-01 RX ORDER — LEVOTHYROXINE SODIUM 0.07 MG/1
TABLET ORAL
Qty: 30 TABLET | Refills: 2 | Status: SHIPPED | OUTPATIENT
Start: 2018-05-01 | End: 2018-06-26 | Stop reason: SDUPTHER

## 2018-05-01 RX ORDER — ALPRAZOLAM 0.5 MG/1
TABLET ORAL
Qty: 30 TABLET | Refills: 2 | Status: SHIPPED | OUTPATIENT
Start: 2018-05-16 | End: 2018-07-31 | Stop reason: SDUPTHER

## 2018-05-01 RX ORDER — ZOLPIDEM TARTRATE 10 MG/1
TABLET ORAL
Qty: 30 TABLET | Refills: 5 | Status: SHIPPED | OUTPATIENT
Start: 2018-05-01 | End: 2018-11-09 | Stop reason: SDUPTHER

## 2018-05-01 RX ORDER — TRAMADOL HYDROCHLORIDE 50 MG/1
50-100 TABLET ORAL EVERY 6 HOURS PRN
Qty: 215 TABLET | Refills: 2 | Status: SHIPPED | OUTPATIENT
Start: 2018-05-16 | End: 2018-07-31 | Stop reason: SDUPTHER

## 2018-06-13 ENCOUNTER — TELEPHONE (OUTPATIENT)
Dept: FAMILY MEDICINE CLINIC | Age: 38
End: 2018-06-13

## 2018-06-26 DIAGNOSIS — E06.3 CHRONIC LYMPHOCYTIC THYROIDITIS: Chronic | ICD-10-CM

## 2018-06-26 RX ORDER — LEVOTHYROXINE SODIUM 0.07 MG/1
TABLET ORAL
Qty: 30 TABLET | Refills: 0 | Status: SHIPPED | OUTPATIENT
Start: 2018-06-26 | End: 2018-08-09 | Stop reason: SDUPTHER

## 2018-07-10 ENCOUNTER — TELEPHONE (OUTPATIENT)
Dept: PRIMARY CARE CLINIC | Age: 38
End: 2018-07-10

## 2018-07-10 NOTE — TELEPHONE ENCOUNTER
Received a PA request for TraMADol HCl 50MG tablets. Patients insurance is coming up invalid. Please verify insurance. Please advise. Thank you.

## 2018-07-31 ENCOUNTER — OFFICE VISIT (OUTPATIENT)
Dept: FAMILY MEDICINE CLINIC | Age: 38
End: 2018-07-31

## 2018-07-31 VITALS
HEART RATE: 64 BPM | DIASTOLIC BLOOD PRESSURE: 64 MMHG | RESPIRATION RATE: 17 BRPM | BODY MASS INDEX: 18.26 KG/M2 | HEIGHT: 62 IN | SYSTOLIC BLOOD PRESSURE: 126 MMHG | OXYGEN SATURATION: 97 % | WEIGHT: 99.2 LBS

## 2018-07-31 DIAGNOSIS — Z79.899 LONG-TERM USE OF HIGH-RISK MEDICATION: ICD-10-CM

## 2018-07-31 DIAGNOSIS — M54.50 CHRONIC BILATERAL LOW BACK PAIN WITHOUT SCIATICA: ICD-10-CM

## 2018-07-31 DIAGNOSIS — G89.29 CHRONIC BILATERAL LOW BACK PAIN WITHOUT SCIATICA: ICD-10-CM

## 2018-07-31 DIAGNOSIS — F41.9 ANXIETY: Primary | Chronic | ICD-10-CM

## 2018-07-31 PROCEDURE — 80305 DRUG TEST PRSMV DIR OPT OBS: CPT | Performed by: FAMILY MEDICINE

## 2018-07-31 PROCEDURE — 99215 OFFICE O/P EST HI 40 MIN: CPT | Performed by: FAMILY MEDICINE

## 2018-07-31 RX ORDER — TRAMADOL HYDROCHLORIDE 50 MG/1
50-100 TABLET ORAL EVERY 6 HOURS PRN
Qty: 210 TABLET | Refills: 2 | Status: SHIPPED | OUTPATIENT
Start: 2018-07-31 | End: 2018-10-29

## 2018-07-31 RX ORDER — ALPRAZOLAM 0.5 MG/1
TABLET ORAL
Qty: 40 TABLET | Refills: 1 | Status: SHIPPED | OUTPATIENT
Start: 2018-07-31 | End: 2018-10-11 | Stop reason: SDUPTHER

## 2018-07-31 RX ORDER — AMITRIPTYLINE HYDROCHLORIDE 10 MG/1
10 TABLET, FILM COATED ORAL 2 TIMES DAILY
Qty: 60 TABLET | Refills: 2 | Status: SHIPPED | OUTPATIENT
Start: 2018-07-31 | End: 2018-10-11

## 2018-07-31 NOTE — PROGRESS NOTES
05/01/18 101 lb 3.2 oz (45.9 kg)   02/16/18 99 lb 3.2 oz (45 kg)     Body mass index is 18.44 kg/m². Results for POC orders placed in visit on 07/31/18   POCT Rapid Drug Screen   Result Value Ref Range    Amphetamine Screen, Urine neg     Barbiturate Screen, Urine NEG     Benzodiazepine Screen, Urine POS     Buprenorphine Urine NEG     Cocaine Metabolite Screen, Urine NEG     Gabapentin Screen, Urine NEG     Methamphetamine, Urine NEG     Methadone Screen, Urine NEG     Opiate Scrn, Ur NEG     Oxycodone Screen, Ur POS     PCP Screen, Urine NEG     Propoxyphene Screen, Urine NEG     THC Screen, Urine NEG     Tricyclic Antidepressants, Urine NEG      MRI of the lumbosacral spine 4/6/2018 [facility])  This shows minimal dextroconvex scoliosis. She has a transitional lumbar sacral anatomy. The lumbarization is on the right. Bones: No displaced fracture. No loss of vertebral body height. No aggressive osseous lesion. No evidence of stress reaction. The sacrum and sacral iliac joints are unremarkable. Cord: Dr. Mandy Guzman medullaris terminates at the L1-L2 level. No abnormal or nerve root signal. No syringohydromyelia. T12L1: Unremarkable without evidence of spinal canal or neural foraminal narrowing. L1-L2: Unremarkable without evidence of spinal canal or neural foraminal narrowing. L2-L3: Unremarkable without evidence of spinal canal or neural foraminal narrowing. L3-L4: Unremarkable without evidence of spinal canal or neural foraminal narrowing. L4L5:  Tiny circumferential disc bulge. Minimal facet arthropathy. No spinal canal or neural foraminal narrowing. L5S1:  Disc desiccation. Small circumferential disc bulge. Small central annular tear and central disc protrusion. Contact of the bilateral descending nerve roots in the lateral recesses without evidence of displacement. Bilateral neural foraminal narrowing. Impression:  1.   Transitional lumbar sacral anatomy numbering as described above.  2.  Multilevel degenerative disc disease worst at L5-S1 level where there is a central annular tear and disc protrusion which contacts but does not displace the bilateral descending nerve roots. Borderline bilateral neural foraminal narrowing at the L5-S1 level. Assessment:      1. Anxiety    2. Chronic bilateral low back pain without sciatica    3. Long-term use of high-risk medication          Plan:       - Counseling More Than 50% of the 40 min Appointment Time     Macario Martinez was seen today for leg pain, back pain and hip pain. Diagnoses and all orders for this visit:    Anxiety  -     ALPRAZolam (XANAX) 0.5 MG tablet; TAKE 1 TABLET BY MOUTH NIGHTLY AND AS NEEDED FOR SLEEP OR  ANXIETY FOR UP TO 30 DAYS. -     amitriptyline (ELAVIL) 10 MG tablet; Take 1 tablet by mouth 2 times daily  -     Ambulatory referral to Psychiatry. Explained I will not continue to write for her to have alprazolam indefinitely. She needs to immediately schedule an appointment with the psychiatric nurse practitioner in order to get better control of her anxiety. Because it takes more time than we have in the 25 minute appointment we will not be able to treat with her anxiety in the future. She needs to schedule    Chronic bilateral low back pain without sciatica  -     traMADol (ULTRAM) 50 MG tablet; Take 1-2 tablets by mouth every 6 hours as needed for Pain for up to 90 days. .  -     XR SACRUM COCCYX (MIN 2 VIEWS); Future  -     amitriptyline (ELAVIL) 10 MG tablet; Take 1 tablet by mouth 2 times daily  SEE ORTHO.   Explained to patient that when we send her to a specialist because we are not sure what going on or have treated and she returns to us insists that we do the testing to confirm a diagnosis that a specialist could not determine it is not logical.  Had we known what the next step was we would've simply done that step to begin with and not sent her to a specialist.  We asked her to see Dr. Joseph Bookbinder again  Will continue to

## 2018-08-01 LAB

## 2018-08-02 ENCOUNTER — TELEPHONE (OUTPATIENT)
Dept: FAMILY MEDICINE CLINIC | Age: 38
End: 2018-08-02

## 2018-08-02 DIAGNOSIS — M25.50 ARTHRALGIA, UNSPECIFIED JOINT: ICD-10-CM

## 2018-08-02 DIAGNOSIS — E78.00 HYPERCHOLESTEROLEMIA: Primary | Chronic | ICD-10-CM

## 2018-08-02 DIAGNOSIS — M79.10 MYALGIA: ICD-10-CM

## 2018-08-02 DIAGNOSIS — E55.9 VITAMIN D DEFICIENCY: Chronic | ICD-10-CM

## 2018-08-03 ENCOUNTER — NURSE ONLY (OUTPATIENT)
Dept: FAMILY MEDICINE CLINIC | Age: 38
End: 2018-08-03

## 2018-08-03 DIAGNOSIS — M79.18 BUTTOCK PAIN: ICD-10-CM

## 2018-08-03 DIAGNOSIS — E06.3 CHRONIC LYMPHOCYTIC THYROIDITIS: Chronic | ICD-10-CM

## 2018-08-03 DIAGNOSIS — E78.00 HYPERCHOLESTEROLEMIA: Chronic | ICD-10-CM

## 2018-08-03 DIAGNOSIS — E55.9 VITAMIN D DEFICIENCY: Chronic | ICD-10-CM

## 2018-08-03 DIAGNOSIS — M25.50 ARTHRALGIA, UNSPECIFIED JOINT: ICD-10-CM

## 2018-08-03 DIAGNOSIS — M79.10 MYALGIA: ICD-10-CM

## 2018-08-03 LAB
BASOPHILS ABSOLUTE: 0.1 K/UL (ref 0–0.2)
BASOPHILS RELATIVE PERCENT: 0.9 %
C-REACTIVE PROTEIN: 0.8 MG/L (ref 0–5.1)
CHOLESTEROL, FASTING: 204 MG/DL (ref 0–199)
EOSINOPHILS ABSOLUTE: 0.2 K/UL (ref 0–0.6)
EOSINOPHILS RELATIVE PERCENT: 3.2 %
GLUCOSE FASTING: 109 MG/DL (ref 70–99)
HCT VFR BLD CALC: 38.8 % (ref 36–48)
HDLC SERPL-MCNC: 80 MG/DL (ref 40–60)
HEMOGLOBIN: 13 G/DL (ref 12–16)
LDL CHOLESTEROL CALCULATED: 114 MG/DL
LYMPHOCYTES ABSOLUTE: 1.8 K/UL (ref 1–5.1)
LYMPHOCYTES RELATIVE PERCENT: 26.8 %
MCH RBC QN AUTO: 31.4 PG (ref 26–34)
MCHC RBC AUTO-ENTMCNC: 33.4 G/DL (ref 31–36)
MCV RBC AUTO: 93.9 FL (ref 80–100)
MONOCYTES ABSOLUTE: 0.5 K/UL (ref 0–1.3)
MONOCYTES RELATIVE PERCENT: 7.7 %
NEUTROPHILS ABSOLUTE: 4 K/UL (ref 1.7–7.7)
NEUTROPHILS RELATIVE PERCENT: 61.4 %
PDW BLD-RTO: 12.5 % (ref 12.4–15.4)
PLATELET # BLD: 323 K/UL (ref 135–450)
PMV BLD AUTO: 8.6 FL (ref 5–10.5)
RBC # BLD: 4.13 M/UL (ref 4–5.2)
SEDIMENTATION RATE, ERYTHROCYTE: 9 MM/HR (ref 0–20)
TOTAL CK: 54 U/L (ref 26–192)
TRIGLYCERIDE, FASTING: 51 MG/DL (ref 0–150)
TSH REFLEX: 2.2 UIU/ML (ref 0.27–4.2)
VITAMIN D 25-HYDROXY: 29.6 NG/ML
VLDLC SERPL CALC-MCNC: 10 MG/DL
WBC # BLD: 6.6 K/UL (ref 4–11)

## 2018-08-03 PROCEDURE — 36415 COLL VENOUS BLD VENIPUNCTURE: CPT | Performed by: FAMILY MEDICINE

## 2018-08-05 LAB
ANA INTERPRETATION: NORMAL
ANTI-NUCLEAR ANTIBODY (ANA): NEGATIVE

## 2018-08-07 PROBLEM — Z79.899 LONG-TERM USE OF HIGH-RISK MEDICATION: Status: ACTIVE | Noted: 2018-08-07

## 2018-08-07 PROBLEM — M54.50 CHRONIC BILATERAL LOW BACK PAIN WITHOUT SCIATICA: Chronic | Status: ACTIVE | Noted: 2018-08-07

## 2018-08-07 PROBLEM — M54.50 CHRONIC BILATERAL LOW BACK PAIN WITHOUT SCIATICA: Status: ACTIVE | Noted: 2018-08-07

## 2018-08-07 PROBLEM — G89.29 CHRONIC BILATERAL LOW BACK PAIN WITHOUT SCIATICA: Status: ACTIVE | Noted: 2018-08-07

## 2018-08-07 PROBLEM — G89.29 CHRONIC BILATERAL LOW BACK PAIN WITHOUT SCIATICA: Chronic | Status: ACTIVE | Noted: 2018-08-07

## 2018-08-09 ENCOUNTER — TELEPHONE (OUTPATIENT)
Dept: FAMILY MEDICINE CLINIC | Age: 38
End: 2018-08-09

## 2018-08-09 DIAGNOSIS — E06.3 CHRONIC LYMPHOCYTIC THYROIDITIS: Chronic | ICD-10-CM

## 2018-08-09 RX ORDER — LEVOTHYROXINE SODIUM 0.07 MG/1
TABLET ORAL
Qty: 30 TABLET | Refills: 2 | Status: SHIPPED | OUTPATIENT
Start: 2018-08-09 | End: 2018-11-01 | Stop reason: SDUPTHER

## 2018-08-09 NOTE — TELEPHONE ENCOUNTER
I spoke to Yue President today- she said she has not taken any opiates. Only tramadol, and tramadol doesn't always show up in a drug screen. It does sometimes show up in our drug screens as TCA. Yue President said she doesn't understand why Dr. Amparo Byrd didn't order her lab work she asked for, or why he said he was sending urine out, and it was not sent out. Do you (Dr. Amparo Byrd) want her to come in for another drug screen? And could you please send in her prescription for thyroid meds to [facility] in her chart?

## 2018-08-09 NOTE — TELEPHONE ENCOUNTER
Notes recorded by Davonte Roland DO on 8/7/2018 at 9:47 PM EDT  Random drug screen shows oxycodone which we did not prescribe patient nor did she report to us. When did she take oxycodone?  All testing for infections was normal negative (CRP, CBC, sedimentation rate).  The CRP which is the most sensitive blood test for an infection was 0.8 normal is 0-5.1 and the lower number at the less likely there is an infection. .  Screening tests for lupus and other autoimmune disorders that cause arthritis or all negative (CRP, sedimentation rate, ADRIEN). CK is a test for muscle breakdown which can cause diffuse muscle pain and that test was normal. Thyroid test was normal this time.  Glucose level was slightly elevated at 109.  Prediabetes does not begin until 115 and diabetes until 126. Vitamin D level was slightly low at 29 0.6. If you're having muscle aches and fatigue you want the vitamin D level at the high end of normal which is 50-80.  I would recommend 4000 international units daily. Is easy to find 2000 international units you can take 2 tabs once  daily for ease of remembering. Your LDL bad cholesterol is slightly high but this is offset by your HDL good cholesterol which is very high.  There is no need for cholesterol medicine at this time. CALLED PT AND GAVE HER ENTIRE MESSAGE ABOVE. SHE IS VERY UPSET OF THE UDS RESULTS SHE SAID I DO NOT TAKE ANYTHING LIKE THAT AND SHE ONLY TAKES TRAMADOL. AND XANAX SHE ALSO STATED HER NAME WAS NOT ON THAT CUP AND THERE WERE SEVERAL CUPS ON THE COUNTER THAT LOOKED LIKE HERS. SHE STATED ALANIS GAVE ME THE RESULTS NEVER TELLING HER THAT IT WAS POS FOR OXY. AND ALANIS TOLD HER THAT HER TRAMADOL WAS POSITIVE IN HER UDS, TRAMADOL DOES NOT SHOW UP IN A POCT RANDOM DRUG SCREEN. SHE STATED SHE HAS BEEN GIVEN A BUNCH OF GRIEF OVER HER UDS BEING IN CONSISTENT BECAUSE TRAMADOL NEVER SHOWS UP.  SHE SAID SHE WAS TOLD BY DR. Stephanie Connor HER URINE WAS SENT OUT A FEW MONTHS AGO AND IT WAS NEVER

## 2018-08-15 ENCOUNTER — OFFICE VISIT (OUTPATIENT)
Dept: NEUROLOGY | Age: 38
End: 2018-08-15

## 2018-08-15 VITALS
HEIGHT: 60 IN | DIASTOLIC BLOOD PRESSURE: 82 MMHG | BODY MASS INDEX: 19.44 KG/M2 | WEIGHT: 99 LBS | HEART RATE: 77 BPM | SYSTOLIC BLOOD PRESSURE: 121 MMHG

## 2018-08-15 DIAGNOSIS — M25.551 BILATERAL HIP PAIN: ICD-10-CM

## 2018-08-15 DIAGNOSIS — M79.605 BILATERAL LEG PAIN: Primary | ICD-10-CM

## 2018-08-15 DIAGNOSIS — M79.604 BILATERAL LEG PAIN: Primary | ICD-10-CM

## 2018-08-15 DIAGNOSIS — H53.8 BLURRED VISION: ICD-10-CM

## 2018-08-15 DIAGNOSIS — M25.552 BILATERAL HIP PAIN: ICD-10-CM

## 2018-08-15 DIAGNOSIS — R20.0 BILATERAL LEG NUMBNESS: ICD-10-CM

## 2018-08-15 PROCEDURE — 99244 OFF/OP CNSLTJ NEW/EST MOD 40: CPT | Performed by: PSYCHIATRY & NEUROLOGY

## 2018-08-15 NOTE — LETTER
Mental Status Exam: Patient is alert, oriented to person, place and time. Recent and remote memory is normal  Fund of Knowledge is normal  Attention/concentration is normal.   Speech : No dysarthria  Language : No aphasia  Funduscopic Exam: sharp disc margins  Cranial Nerves:   II: Visual fields:  Full to confrontation  III: Pupils:  equal, round, reactive to light  III,IV,VI: Extra Ocular Movements are intact. No nystagmus  V: Facial sensation is intact to pin prick and light touch  VII: Facial strength and movements: intact and symmetric smile,cheek puffing and eyebrow elevation  VIII: Hearing:  Intact to finger rub bilaterally  IX: Palate  elevation is symmetric  XI: Shoulder shrug is intact  XII: Tongue movements are normal  Motor:  Muscle tone and bulk are normal.   Strength is symmetrical 5/5 in all four extremities. Sensory: Intact to light touch and  pin prick in all four extremities  Coordination:  Normal  Finger to Nose and Heel to Shin bilaterally    . Reflexes:  DTR +2 and symmetric bilaterally  Plantar response: Flexor bilaterally  Gait: Gait and station is normal. Patient can toe/ heel and tandem walk without difficulty  Romberg: negative  Vascular: No carotid bruit bilaterally        DATA:  LABS:  General Labs:    CBC:   Lab Results   Component Value Date    WBC 6.6 08/03/2018    RBC 4.13 08/03/2018    HGB 13.0 08/03/2018    HCT 38.8 08/03/2018    MCV 93.9 08/03/2018    MCH 31.4 08/03/2018    MCHC 33.4 08/03/2018    RDW 12.5 08/03/2018     08/03/2018    MPV 8.6 08/03/2018     BMP:    Lab Results   Component Value Date     04/14/2016    K 4.6 04/14/2016     04/14/2016    CO2 23 04/14/2016    BUN 11 04/14/2016    LABALBU 4.4 04/14/2016    CREATININE 0.5 04/14/2016    CALCIUM 9.9 04/14/2016    GFRAA >60 04/14/2016    GFRAA >60 05/21/2013    LABGLOM >60 04/14/2016    GLUCOSE 96 04/14/2016       IMPRESSION :  Patient comes with severe bilateral hip pain and some leg numbness.   She

## 2018-08-31 ENCOUNTER — TELEPHONE (OUTPATIENT)
Dept: FAMILY MEDICINE CLINIC | Age: 38
End: 2018-08-31

## 2018-09-10 NOTE — TELEPHONE ENCOUNTER
Checked prior refills. Patient was given #40 per month with one refill on 7/31/2018. She'll be due therefore on 10/1/18. We will give her sufficient to last until 11/15/18  when her appointment with Ilda Holter CNP -psych.

## 2018-10-11 ENCOUNTER — OFFICE VISIT (OUTPATIENT)
Dept: PSYCHIATRY | Age: 38
End: 2018-10-11
Payer: COMMERCIAL

## 2018-10-11 VITALS
WEIGHT: 106.6 LBS | SYSTOLIC BLOOD PRESSURE: 110 MMHG | HEART RATE: 64 BPM | DIASTOLIC BLOOD PRESSURE: 80 MMHG | BODY MASS INDEX: 17.76 KG/M2 | HEIGHT: 65 IN

## 2018-10-11 DIAGNOSIS — F33.1 MAJOR DEPRESSIVE DISORDER, RECURRENT EPISODE, MODERATE (HCC): ICD-10-CM

## 2018-10-11 DIAGNOSIS — F41.9 ANXIETY: Primary | Chronic | ICD-10-CM

## 2018-10-11 DIAGNOSIS — F42.2 MIXED OBSESSIONAL THOUGHTS AND ACTS: ICD-10-CM

## 2018-10-11 PROCEDURE — 99204 OFFICE O/P NEW MOD 45 MIN: CPT | Performed by: NURSE PRACTITIONER

## 2018-10-11 RX ORDER — ALPRAZOLAM 0.5 MG/1
TABLET ORAL
Qty: 30 TABLET | Refills: 0 | Status: SHIPPED | OUTPATIENT
Start: 2018-10-18 | End: 2018-11-09 | Stop reason: SDUPTHER

## 2018-10-11 ASSESSMENT — PATIENT HEALTH QUESTIONNAIRE - PHQ9
4. FEELING TIRED OR HAVING LITTLE ENERGY: 2
7. TROUBLE CONCENTRATING ON THINGS, SUCH AS READING THE NEWSPAPER OR WATCHING TELEVISION: 2
1. LITTLE INTEREST OR PLEASURE IN DOING THINGS: 3
SUM OF ALL RESPONSES TO PHQ9 QUESTIONS 1 & 2: 5
SUM OF ALL RESPONSES TO PHQ QUESTIONS 1-9: 18
3. TROUBLE FALLING OR STAYING ASLEEP: 3
SUM OF ALL RESPONSES TO PHQ QUESTIONS 1-9: 18
6. FEELING BAD ABOUT YOURSELF - OR THAT YOU ARE A FAILURE OR HAVE LET YOURSELF OR YOUR FAMILY DOWN: 3
9. THOUGHTS THAT YOU WOULD BE BETTER OFF DEAD, OR OF HURTING YOURSELF: 0
2. FEELING DOWN, DEPRESSED OR HOPELESS: 2
5. POOR APPETITE OR OVEREATING: 3
10. IF YOU CHECKED OFF ANY PROBLEMS, HOW DIFFICULT HAVE THESE PROBLEMS MADE IT FOR YOU TO DO YOUR WORK, TAKE CARE OF THINGS AT HOME, OR GET ALONG WITH OTHER PEOPLE: 1
8. MOVING OR SPEAKING SO SLOWLY THAT OTHER PEOPLE COULD HAVE NOTICED. OR THE OPPOSITE, BEING SO FIGETY OR RESTLESS THAT YOU HAVE BEEN MOVING AROUND A LOT MORE THAN USUAL: 0

## 2018-10-11 ASSESSMENT — ANXIETY QUESTIONNAIRES
2. NOT BEING ABLE TO STOP OR CONTROL WORRYING: 3-NEARLY EVERY DAY
GAD7 TOTAL SCORE: 18
5. BEING SO RESTLESS THAT IT IS HARD TO SIT STILL: 1-SEVERAL DAYS
7. FEELING AFRAID AS IF SOMETHING AWFUL MIGHT HAPPEN: 3-NEARLY EVERY DAY
3. WORRYING TOO MUCH ABOUT DIFFERENT THINGS: 3-NEARLY EVERY DAY
1. FEELING NERVOUS, ANXIOUS, OR ON EDGE: 3-NEARLY EVERY DAY
6. BECOMING EASILY ANNOYED OR IRRITABLE: 3-NEARLY EVERY DAY
4. TROUBLE RELAXING: 2-OVER HALF THE DAYS

## 2018-10-11 NOTE — PROGRESS NOTES
PSYCHIATRY INITIAL EVALUATION/DIAGNOSTIC ASSESSMENT    Zia Carrera  1980  10/11/18    Face to Face time: 90 mins  CC:   Chief Complaint   Patient presents with    Anxiety       HPI:   Zia Carrera is a 34 y.o. female with h/o depression and anxiety who p/t clinic to establish care with this provider. Referred by Maye Ding DO. Always had anxiety, previously was able to control. Worse x past couple years (prior to the fall) without identifiable trigger. Worries about family, death, now worries about personal medical issues. Depression \"my whole life\". Often wondered if manic depressive. Mood swings, can change quickly within a day. Longstanding. No FH BAD. Has been on xanax for past 2 years. Waking up with panic attacks. Worried about family dying, mom and grandmother dying. Could have perfectly normal day and get \"super scared feeling for no reason\". Admits takes more xanax than prescribed, sometimes takes 1.5 tabs/day but tries to limit to ensure has enough to last the month. Wants to have at least two/day ordered to be used as needed. Now having terrible panic attacks, worse than ever d/t medical condition. Not able to get out, not working. Has seen several specialists. No clear etiology for symptoms, no DX yet. Thinks people don't believe she's in pain because has no dx. Constant pain in hips, back. Had epidural cortisone injection, says feels worse since then. Keisha Beverage on back 2 years ago while working at [employer]. No major pain initially, only mild bruising. Developed pain bilat legs, hips and neuropathy abruptly 2/2018. Being evaluated by Neuro for possible MS. Per excerpt of Neuro consult 8/2018:     \"no electrophysiological evidence of peripheral neuropathy or myelopathy. There is no clinical evidence for any upper motor neuron lesion. Multiple sclerosis cannot be totally ruled out but seems to be less likely at this point. \"    To see Neuro again Psychiatrist: denies    Therapist: denies   Suicide Attempts: denies; per chart review says had intentional drug overdose via care everywhere, pt denies this today   Hx SH:  denies    Past Psychopharmacologic Trials (including response/reactions):    1. Paxil:  In 20's, ? Effect, didn't want to take  2. Amitriptyline:  \"makes me feel like I've got goggles on or something\"      Substance Use History:   Nicotine:   History   Smoking Status    Former Smoker    Years: 15.00    Types: Cigarettes    Quit date: 4/3/2012   Smokeless Tobacco    Never Used     Comment: Sometime no smoking; Educated in cessation. Alcohol: off and on heavy drinking in past; quit drinking after dui in 2586   Illicits: denies   Caffeine: 1 mt dew or coffee/day   Rehabs/Complicated W/D: x 1 month after dui    Past Medical/Surgical History:   Past Medical History:   Diagnosis Date    Cervical stenosis (uterine cervix) 1/1/2014    Chronic lymphocytic thyroiditis     DDD (degenerative disc disease), lumbar 04/10/2018    ?  L3-4     Depression     Drug overdose, intentional ([facility] 11/11/2015    found in [address] Genital herpes     GERD (gastroesophageal reflux disease)     Hypercholesterolemia 5/21/2013    Infertility     Vitamin D deficiency 1/7/2015     Past Surgical History:   Procedure Laterality Date    HYSTEROSCOPY Bilateral 2013    hystosalpingram    LUMBAR SPINE SURGERY      MIKO         PCP: Hilary Tamayo DO      Social/Developmental History:    Developmental: Born [place] raised[place]upbringing[place]   Marital: x 5 years (together x 14 years)   Children: none   Family:  2 younger half brothers, 3 older brother, 3 younger sisters, parents living; good relationship with most family, fall-out with sister over past 1.5 years, refuses to give details; was 1years old when parents , dad lives in [place], fair relationship   Housing: renting house with  and 3 dogs   Occupation/Income: sales dementia, all-cause mortality with benzo use. Offered to transition to clonazepam for better anxiety coverage while making additional med changes. Pt refuses to consider this. Becomes irritable. Demanding to know what she can get today as far as xanax prescription    - would recommend trial cymbalta and gabapentin for better anxiety control, may also help chronic pain sx  Would cautiously monitor addition of any serotonergic medications d/t amount of tramadol she takes daily could increase risk of serotonin syndrome/seizures. Could consider trial SGA for mood/anxiety/ocd sx. Pt refuses to consider any medications outside of tramadol and xanax     - ordered one-time only 30-day supply xanax once daily to be filled 10/18/18 based upon date of last fill to cover until can establish with community psychiatrist.  Miguel Angel Motley would not provide additional refills. Will need to seek community provider if wants to maintain on xanax as pt resistant to trial other options to treat anxiety and mood  -Labs: reviewed in Epic, up to date  -STRONGLY Recommend outpt therapy. Discussed role in anxiety treatment, CBT is mainstay of anxiety tx. Ambivalent. Resources as below. Also encouraged to check with insurance for covered providers  -OARRS reviewed, c/w history  -R/b/se/a d/w pt who consents. 3. Medical  -Following with Elizabeth Hill DO    4. Substance   - h/o etoh abuse  - h/o inconsistent UDS results in past    5. RTC - prn. Referred to community providers d/t pt resistance to explore alternatives for anxiety/depressive symptoms outside of xanax alone      Prudence Varinder, [address] [facility]  Psychiatric Nurse Practitioner       [place] [address] Counseling Service   [phone] (toll free)  [phone]   [address] not accepted. Locations:    [place]: [facility], [address], [place] 19  [employer]: [address], [employer], [address] [place]: [address] [address]: individual, couples, [address] [facility], [address] [place] [address]  [phone]    Kyra Rosario. [address], [address] [facility]  [phone])    National Suicide Prevention Lifeline  (005) 558-TALK (4361)  www.suicidepreventionlifeline. [facility] of [address] [facility] ([facility]): [address]  [address]      Text Support  Text 107899 \"connect\" if suicidal for contact via text or phone call

## 2018-10-11 NOTE — PATIENT INSTRUCTIONS
[address]     [phone]      Dr. Cyndie Hood [facility], [place]. 8   [address]  [place], [address] [facility] [address]   [place], [address]  [phone]    Dr. Rosibel Roberto. [address] [facility]. [address] [facility], [address] [place] [address]  [phone]    Psychotherapists    [facility]: individual, couples, [address] [facility], [address] [place] [address]  [phone]    Tara Killer, [address] [facility] 79, [address] [facility]  [phone]    National Suicide Prevention Lifeline  (360) 546-TALK (8775)  www.suicidepreventionlifeline. Kamron Gun of [address] ([place]): [address]  [address] [place] [address] [address] [place] [address]      Text Support  Text 155598 \"connect\" if suicidal for contact via text or phone call

## 2018-10-29 ENCOUNTER — HOSPITAL ENCOUNTER (OUTPATIENT)
Dept: GENERAL RADIOLOGY | Age: 38
Discharge: HOME | End: 2018-10-29
Payer: COMMERCIAL

## 2018-10-29 ENCOUNTER — HOSPITAL ENCOUNTER (OUTPATIENT)
Age: 38
Discharge: HOME | End: 2018-10-29
Payer: COMMERCIAL

## 2018-10-29 DIAGNOSIS — G89.29 CHRONIC BILATERAL LOW BACK PAIN WITHOUT SCIATICA: ICD-10-CM

## 2018-10-29 DIAGNOSIS — M54.50 CHRONIC BILATERAL LOW BACK PAIN WITHOUT SCIATICA: ICD-10-CM

## 2018-10-29 PROCEDURE — 72220 X-RAY EXAM SACRUM TAILBONE: CPT

## 2018-11-01 ENCOUNTER — TELEPHONE (OUTPATIENT)
Dept: FAMILY MEDICINE CLINIC | Age: 38
End: 2018-11-01

## 2018-11-01 DIAGNOSIS — E06.3 CHRONIC LYMPHOCYTIC THYROIDITIS: Chronic | ICD-10-CM

## 2018-11-01 RX ORDER — LEVOTHYROXINE SODIUM 0.07 MG/1
TABLET ORAL
Qty: 30 TABLET | Refills: 0 | Status: SHIPPED | OUTPATIENT
Start: 2018-11-01 | End: 2018-11-09 | Stop reason: SDUPTHER

## 2018-11-02 DIAGNOSIS — F51.04 PSYCHOPHYSIOLOGICAL INSOMNIA: ICD-10-CM

## 2018-11-02 RX ORDER — ZOLPIDEM TARTRATE 10 MG/1
TABLET ORAL
Qty: 30 TABLET | Refills: 0 | OUTPATIENT
Start: 2018-11-02

## 2018-11-09 ENCOUNTER — OFFICE VISIT (OUTPATIENT)
Dept: FAMILY MEDICINE CLINIC | Age: 38
End: 2018-11-09
Payer: COMMERCIAL

## 2018-11-09 VITALS
DIASTOLIC BLOOD PRESSURE: 72 MMHG | SYSTOLIC BLOOD PRESSURE: 100 MMHG | WEIGHT: 101 LBS | BODY MASS INDEX: 19.83 KG/M2 | HEIGHT: 60 IN

## 2018-11-09 DIAGNOSIS — E06.3 CHRONIC LYMPHOCYTIC THYROIDITIS: Chronic | ICD-10-CM

## 2018-11-09 DIAGNOSIS — F51.04 PSYCHOPHYSIOLOGICAL INSOMNIA: ICD-10-CM

## 2018-11-09 DIAGNOSIS — M54.50 CHRONIC BILATERAL LOW BACK PAIN WITHOUT SCIATICA: Chronic | ICD-10-CM

## 2018-11-09 DIAGNOSIS — S32.10XA CLOSED FRACTURE OF SACRUM, UNSPECIFIED PORTION OF SACRUM, INITIAL ENCOUNTER (HCC): Primary | ICD-10-CM

## 2018-11-09 DIAGNOSIS — G89.29 CHRONIC BILATERAL LOW BACK PAIN WITHOUT SCIATICA: Chronic | ICD-10-CM

## 2018-11-09 DIAGNOSIS — F41.9 ANXIETY: Chronic | ICD-10-CM

## 2018-11-09 PROCEDURE — 99214 OFFICE O/P EST MOD 30 MIN: CPT | Performed by: FAMILY MEDICINE

## 2018-11-09 RX ORDER — TRAMADOL HYDROCHLORIDE 50 MG/1
TABLET ORAL
Refills: 0 | COMMUNITY
Start: 2018-11-04 | End: 2018-11-09 | Stop reason: SDUPTHER

## 2018-11-09 RX ORDER — LEVOTHYROXINE SODIUM 0.07 MG/1
TABLET ORAL
Qty: 30 TABLET | Refills: 5 | Status: SHIPPED | OUTPATIENT
Start: 2018-11-09 | End: 2018-12-03 | Stop reason: SDUPTHER

## 2018-11-09 RX ORDER — ZOLPIDEM TARTRATE 10 MG/1
TABLET ORAL
Qty: 30 TABLET | Refills: 5 | Status: SHIPPED | OUTPATIENT
Start: 2018-11-09 | End: 2019-04-22 | Stop reason: SDUPTHER

## 2018-11-09 RX ORDER — TRAMADOL HYDROCHLORIDE 50 MG/1
50-100 TABLET ORAL EVERY 6 HOURS PRN
Qty: 210 TABLET | Refills: 0 | Status: SHIPPED | OUTPATIENT
Start: 2018-11-09 | End: 2018-12-17 | Stop reason: SDUPTHER

## 2018-11-09 RX ORDER — ALPRAZOLAM 0.5 MG/1
TABLET ORAL
Qty: 30 TABLET | Refills: 2 | Status: SHIPPED | OUTPATIENT
Start: 2018-11-09 | End: 2019-02-12 | Stop reason: SDUPTHER

## 2018-11-09 NOTE — PATIENT INSTRUCTIONS
Lena Mortimer was seen today for results, hypothyroidism, pain and other. Diagnoses and all orders for this visit:    Chronic bilateral low back pain without sciatica  -     traMADol (ULTRAM) 50 MG tablet; Take 1-2 tablets by mouth every 6 hours as needed for Pain for up to 90 days. .    Closed fracture of sacrum, unspecified portion of sacrum, initial encounter (formerly [facility])  -     traMADol (ULTRAM) 50 MG tablet; Take 1-2 tablets by mouth every 6 hours as needed for Pain for up to 90 days. .  Discuss with Dr Leanne Mccabe as to whether the symptoms you are having could relate in anyway to your fracture. Chronic lymphocytic thyroiditis  -     levothyroxine (SYNTHROID) 75 MCG tablet; TAKE 1 TABLET BY MOUTH EVERY DAY  -     Good control.  -     Continue meds and lifestyle control. Anxiety  -     ALPRAZolam (XANAX) 0.5 MG tablet; TAKE 1 TABLET BY MOUTH NIGHTLY AND AS NEEDED FOR SLEEP OR  ANXIETY FOR UP TO 30 DAYS.  -     Good control.  -     Continue meds and lifestyle control. Psychophysiological insomnia  -     zolpidem (AMBIEN) 10 MG tablet; TAKE 1 TABLET BY MOUTH EVERY DAY AT BEDTIME AS NEEDED FOR SLEEP.  -     Good control.  -     Continue meds and lifestyle control. Constipation  -     Good control.  -     Continue meds and lifestyle control. Underweight  Resolved. Likely from being off work. Body mass index is 19.73 kg/m². Goal is 19-25.

## 2018-11-15 ENCOUNTER — OFFICE VISIT (OUTPATIENT)
Dept: NEUROLOGY | Age: 38
End: 2018-11-15
Payer: COMMERCIAL

## 2018-11-15 VITALS
SYSTOLIC BLOOD PRESSURE: 121 MMHG | DIASTOLIC BLOOD PRESSURE: 79 MMHG | WEIGHT: 101 LBS | BODY MASS INDEX: 19.83 KG/M2 | HEIGHT: 60 IN | HEART RATE: 110 BPM

## 2018-11-15 DIAGNOSIS — M79.605 BILATERAL LEG PAIN: Primary | ICD-10-CM

## 2018-11-15 DIAGNOSIS — M79.604 BILATERAL LEG PAIN: Primary | ICD-10-CM

## 2018-11-15 PROCEDURE — 99213 OFFICE O/P EST LOW 20 MIN: CPT | Performed by: PSYCHIATRY & NEUROLOGY

## 2018-11-15 NOTE — PROGRESS NOTES
Grandmother         precancer skin lesions.  Kidney Cancer Maternal Grandmother         nephrectomy    High Blood Pressure Maternal Grandfather     Clotting Disorder Maternal Grandfather     No Known Problems Sister     Heart Failure Paternal Grandmother     Thyroid Disease Paternal Grandmother     Osteoporosis Maternal Aunt     Anxiety Disorder Maternal Aunt     No Known Problems Brother     No Known Problems Brother      Social History     Social History    Marital status:      Spouse name: Ulises Palacios Number of children: 0    Years of education: 15     Occupational History    Sales  - sales      Summer     Unemployed      Social History Main Topics    Smoking status: Former Smoker     Years: 15.00     Types: Cigarettes     Quit date: 4/3/2012    Smokeless tobacco: Never Used      Comment: Sometime no smoking; Educated in cessation.  Alcohol use No      Comment: Quit drinking 4/2012. Was drinking 8-10 beers every other day for 2 months. Rarely drinks (holidays) then just a couple.  Drug use: No    Sexual activity: Yes     Partners: Male     Other Topics Concern    None     Social History Narrative    No exercise. 5/31/17. 8/25/17. 11/17/17. Stretches only. 2/16/18. Daily activity. 7/31/18. Not working. Can't sit or stand for a long time. 11/9/18        Objective:  Exam:  /79   Pulse 110   Ht 5' (1.524 m)   Wt 101 lb (45.8 kg)   LMP 11/07/2018 (Exact Date)   BMI 19.73 kg/m²   This is a well-nourished patient in no acute distress  Patient is awake, alert and oriented x3. Speech is normal.  Pupils are equal round reacting to light. Extraocular movements intact. Face symmetrical. Tongue midline. Motor exam shows normal symmetrical strength. Deep tendon reflexes normal. Plantar reflexes downgoing. Sensory exam normal. Coordination normal. Gait normal. No carotid bruit. No neck stiffness.       Data :  LABS:  General Labs:    CBC:   Lab Results   Component

## 2018-11-27 ENCOUNTER — TELEPHONE (OUTPATIENT)
Dept: FAMILY MEDICINE CLINIC | Age: 38
End: 2018-11-27

## 2018-11-27 DIAGNOSIS — J30.1 SEASONAL ALLERGIC RHINITIS DUE TO POLLEN: ICD-10-CM

## 2018-11-27 RX ORDER — FLUTICASONE PROPIONATE 50 MCG
SPRAY, SUSPENSION (ML) NASAL
Qty: 16 G | Refills: 5 | Status: SHIPPED | OUTPATIENT
Start: 2018-11-27 | End: 2019-04-22 | Stop reason: SDUPTHER

## 2018-12-03 DIAGNOSIS — E06.3 CHRONIC LYMPHOCYTIC THYROIDITIS: Chronic | ICD-10-CM

## 2018-12-03 RX ORDER — LEVOTHYROXINE SODIUM 0.07 MG/1
TABLET ORAL
Qty: 90 TABLET | Refills: 0 | Status: SHIPPED | OUTPATIENT
Start: 2018-12-03 | End: 2019-04-22 | Stop reason: SDUPTHER

## 2018-12-04 ENCOUNTER — TELEPHONE (OUTPATIENT)
Dept: FAMILY MEDICINE CLINIC | Age: 38
End: 2018-12-04

## 2018-12-04 DIAGNOSIS — S32.10XS CLOSED FRACTURE OF SACRUM, UNSPECIFIED FRACTURE MORPHOLOGY, SEQUELA: Primary | ICD-10-CM

## 2018-12-04 DIAGNOSIS — M53.3 COCCYX DISORDER: ICD-10-CM

## 2018-12-04 DIAGNOSIS — M53.3 SACRAL PAIN: ICD-10-CM

## 2018-12-04 DIAGNOSIS — M53.3 COCCYX PAIN: ICD-10-CM

## 2018-12-04 NOTE — TELEPHONE ENCOUNTER
Pt is calling Dr Amarilys Mack back. You can leave pt a message on her phone if she is unable to answer. We are  Usually closed and she can not call back.

## 2018-12-11 ENCOUNTER — TELEPHONE (OUTPATIENT)
Dept: FAMILY MEDICINE CLINIC | Age: 38
End: 2018-12-11

## 2018-12-12 NOTE — TELEPHONE ENCOUNTER
Spoke with patient. Explained that IV contrast for MRIs can be nephrotoxic. Also because IV contrast for MRIs are expensive insurance denies requests for them frequently and would require another wait a couple weeks to get authorization if we got it at all. When we get that information I will still not no what we would do with that information. Recommended that she get online and ask a question @ [email or web address]. [email or web address]. Must be specific and ask questions like who can treat sacral fractures and how are they treated and not something nonspecific like what can I do to find out the cause of pain in the buttock.

## 2018-12-17 DIAGNOSIS — M54.50 CHRONIC BILATERAL LOW BACK PAIN WITHOUT SCIATICA: Chronic | ICD-10-CM

## 2018-12-17 DIAGNOSIS — D25.1 INTRAMURAL AND SUBSEROUS LEIOMYOMA OF UTERUS: Chronic | ICD-10-CM

## 2018-12-17 DIAGNOSIS — G89.29 CHRONIC BILATERAL LOW BACK PAIN WITHOUT SCIATICA: Chronic | ICD-10-CM

## 2018-12-17 DIAGNOSIS — D25.2 INTRAMURAL AND SUBSEROUS LEIOMYOMA OF UTERUS: Chronic | ICD-10-CM

## 2018-12-20 ENCOUNTER — PATIENT MESSAGE (OUTPATIENT)
Dept: FAMILY MEDICINE CLINIC | Age: 38
End: 2018-12-20

## 2018-12-20 PROBLEM — D25.9 FIBROID UTERUS: Chronic | Status: ACTIVE | Noted: 2018-12-17

## 2018-12-20 RX ORDER — TRAMADOL HYDROCHLORIDE 50 MG/1
50-100 TABLET ORAL EVERY 6 HOURS PRN
Qty: 210 TABLET | Refills: 1 | Status: SHIPPED | OUTPATIENT
Start: 2018-12-20 | End: 2019-02-12 | Stop reason: SDUPTHER

## 2018-12-20 NOTE — TELEPHONE ENCOUNTER
Will refer to Audley Boxer. Or  Aramis Giles MD    [facility], Romeo Hernandez MD  [address] [facility] [address], [place], [address] [facility]  Ph:  [place] [address] Pain Specialist  - Aramis Giles, [address] [facility] [facility] [address], [address] [place], [address]  Ph: [phone]      FINDINGS: Creed Brown marrow signal intensity is normal.  No evidence of recent macrofracture,   contusion or stress fracture.  Transitional lumbosacral vertebra is noted.  Mild disc   desiccation is present at the L5-S1 intervertebral disc along with mild posterior displacement.    S1-2 intervertebral disc height and signal are unremarkable.  Assessment of the hip joints is   limited however no substantive arthropathic change, hip dysplasia or avascular necrosis   identified. Distal 3 segments of the coccyx are fused.  Type 2 coccyx with increased forward curvature and   forward pointing apex is present. No fractures or dislocation. Visualized aspects of the musculature and tendons are intact. No bursitis.  No soft tissue mass.  No high-grade soft tissue inflammation or evidence of injury. Anteverted, anteflexed uterus is present. Candiss [place] is a 1.7 cm in diameter subserosal fibroid at   the right lateral anterior aspect of the uterine corpus (series 6, image 18).  Another   subcentimeter in diameter fibroid measuring smaller than 1 cm in diameter at the anterior   aspect of the uterine corpus (series 6, image 15).  Endometrial cavity is within normal limits.    The cervix is unremarkable.  Small free fluid is seen in cul-de-sac. A 2.2 cm in diameter follicle cyst is seen in the left ovary.  The right ovary is within normal   limits. Visualized bowel segments are unremarkable. CONCLUSION:  1. Distal 3 segments of the coccyx are fused.  Type 2 coccyx with increased forward curvature   and forward pointing apex is present.  No fractures or dislocation.   2. Fibroid uterus with a subserosal 2.2 cm in

## 2018-12-25 ENCOUNTER — PATIENT MESSAGE (OUTPATIENT)
Dept: FAMILY MEDICINE CLINIC | Age: 38
End: 2018-12-25

## 2019-02-12 ENCOUNTER — OFFICE VISIT (OUTPATIENT)
Dept: FAMILY MEDICINE CLINIC | Age: 39
End: 2019-02-12
Payer: COMMERCIAL

## 2019-02-12 VITALS
WEIGHT: 106.8 LBS | RESPIRATION RATE: 22 BRPM | HEIGHT: 60 IN | HEART RATE: 68 BPM | OXYGEN SATURATION: 98 % | BODY MASS INDEX: 20.97 KG/M2 | DIASTOLIC BLOOD PRESSURE: 66 MMHG | SYSTOLIC BLOOD PRESSURE: 108 MMHG

## 2019-02-12 DIAGNOSIS — F41.9 ANXIETY: Chronic | ICD-10-CM

## 2019-02-12 DIAGNOSIS — M54.50 CHRONIC BILATERAL LOW BACK PAIN WITHOUT SCIATICA: Chronic | ICD-10-CM

## 2019-02-12 DIAGNOSIS — M53.3 COCCYX PAIN: ICD-10-CM

## 2019-02-12 DIAGNOSIS — Q76.49 TRANSITIONAL VERTEBRAE: Chronic | ICD-10-CM

## 2019-02-12 DIAGNOSIS — G89.29 CHRONIC BILATERAL LOW BACK PAIN WITHOUT SCIATICA: Chronic | ICD-10-CM

## 2019-02-12 PROCEDURE — 99213 OFFICE O/P EST LOW 20 MIN: CPT | Performed by: FAMILY MEDICINE

## 2019-02-12 RX ORDER — TRAMADOL HYDROCHLORIDE 50 MG/1
50-100 TABLET ORAL EVERY 6 HOURS PRN
Qty: 210 TABLET | Refills: 1 | Status: SHIPPED | OUTPATIENT
Start: 2019-02-12 | End: 2019-04-22 | Stop reason: SDUPTHER

## 2019-02-12 RX ORDER — ALPRAZOLAM 0.5 MG/1
TABLET ORAL
Qty: 30 TABLET | Refills: 2 | Status: SHIPPED | OUTPATIENT
Start: 2019-02-12 | End: 2019-04-22 | Stop reason: SDUPTHER

## 2019-02-24 PROBLEM — Q76.49 TRANSITIONAL VERTEBRAE: Chronic | Status: ACTIVE | Noted: 2018-12-17

## 2019-02-24 PROBLEM — M53.3 COCCYX PAIN: Chronic | Status: ACTIVE | Noted: 2017-05-01

## 2019-02-24 PROBLEM — M53.3 COCCYX PAIN: Status: ACTIVE | Noted: 2019-02-24

## 2019-02-25 ENCOUNTER — PATIENT MESSAGE (OUTPATIENT)
Dept: FAMILY MEDICINE CLINIC | Age: 39
End: 2019-02-25

## 2019-02-25 DIAGNOSIS — G89.29 CHRONIC BILATERAL LOW BACK PAIN WITHOUT SCIATICA: Chronic | ICD-10-CM

## 2019-02-25 DIAGNOSIS — M54.50 CHRONIC BILATERAL LOW BACK PAIN WITHOUT SCIATICA: Chronic | ICD-10-CM

## 2019-02-25 RX ORDER — TRAMADOL HYDROCHLORIDE 50 MG/1
TABLET ORAL
Qty: 210 TABLET | Refills: 0 | OUTPATIENT
Start: 2019-02-25

## 2019-03-25 ENCOUNTER — TELEPHONE (OUTPATIENT)
Dept: FAMILY MEDICINE CLINIC | Age: 39
End: 2019-03-25

## 2019-04-22 ENCOUNTER — OFFICE VISIT (OUTPATIENT)
Dept: FAMILY MEDICINE CLINIC | Age: 39
End: 2019-04-22
Payer: COMMERCIAL

## 2019-04-22 VITALS
SYSTOLIC BLOOD PRESSURE: 100 MMHG | HEART RATE: 87 BPM | BODY MASS INDEX: 20.73 KG/M2 | DIASTOLIC BLOOD PRESSURE: 60 MMHG | OXYGEN SATURATION: 98 % | HEIGHT: 60 IN | WEIGHT: 105.6 LBS

## 2019-04-22 DIAGNOSIS — F41.9 ANXIETY: Chronic | ICD-10-CM

## 2019-04-22 DIAGNOSIS — F51.04 PSYCHOPHYSIOLOGICAL INSOMNIA: ICD-10-CM

## 2019-04-22 DIAGNOSIS — G89.29 CHRONIC SI JOINT PAIN: Primary | ICD-10-CM

## 2019-04-22 DIAGNOSIS — J30.1 SEASONAL ALLERGIC RHINITIS DUE TO POLLEN: ICD-10-CM

## 2019-04-22 DIAGNOSIS — G89.29 CHRONIC BILATERAL LOW BACK PAIN WITHOUT SCIATICA: Chronic | ICD-10-CM

## 2019-04-22 DIAGNOSIS — M54.50 CHRONIC BILATERAL LOW BACK PAIN WITHOUT SCIATICA: Chronic | ICD-10-CM

## 2019-04-22 DIAGNOSIS — M53.3 CHRONIC SI JOINT PAIN: Primary | ICD-10-CM

## 2019-04-22 DIAGNOSIS — E06.3 CHRONIC LYMPHOCYTIC THYROIDITIS: Chronic | ICD-10-CM

## 2019-04-22 PROCEDURE — 99214 OFFICE O/P EST MOD 30 MIN: CPT | Performed by: FAMILY MEDICINE

## 2019-04-22 RX ORDER — TRAMADOL HYDROCHLORIDE 50 MG/1
50-100 TABLET ORAL EVERY 6 HOURS PRN
Qty: 210 TABLET | Refills: 1 | Status: SHIPPED | OUTPATIENT
Start: 2019-04-22 | End: 2019-06-21

## 2019-04-22 RX ORDER — FLUTICASONE PROPIONATE 50 MCG
SPRAY, SUSPENSION (ML) NASAL
Qty: 16 G | Refills: 5 | Status: SHIPPED | OUTPATIENT
Start: 2019-04-22 | End: 2020-01-16

## 2019-04-22 RX ORDER — LEVOTHYROXINE SODIUM 0.07 MG/1
TABLET ORAL
Qty: 90 TABLET | Refills: 0 | Status: SHIPPED | OUTPATIENT
Start: 2019-04-22 | End: 2019-07-25 | Stop reason: SDUPTHER

## 2019-04-22 RX ORDER — ZOLPIDEM TARTRATE 10 MG/1
TABLET ORAL
Qty: 30 TABLET | Refills: 5 | Status: SHIPPED | OUTPATIENT
Start: 2019-04-22 | End: 2019-10-07 | Stop reason: SDUPTHER

## 2019-04-22 RX ORDER — ALPRAZOLAM 0.5 MG/1
TABLET ORAL
Qty: 30 TABLET | Refills: 2 | Status: SHIPPED | OUTPATIENT
Start: 2019-04-22 | End: 2019-07-25 | Stop reason: SDUPTHER

## 2019-04-22 SDOH — ECONOMIC STABILITY: TRANSPORTATION INSECURITY
IN THE PAST 12 MONTHS, HAS LACK OF TRANSPORTATION KEPT YOU FROM MEETINGS, WORK, OR FROM GETTING THINGS NEEDED FOR DAILY LIVING?: NO

## 2019-04-22 SDOH — ECONOMIC STABILITY: TRANSPORTATION INSECURITY
IN THE PAST 12 MONTHS, HAS THE LACK OF TRANSPORTATION KEPT YOU FROM MEDICAL APPOINTMENTS OR FROM GETTING MEDICATIONS?: NO

## 2019-04-22 NOTE — PATIENT INSTRUCTIONS
Fadia Schwarz was seen today for ear problem, anxiety and neck pain. Diagnoses and all orders for this visit:    Anxiety  -     ALPRAZolam (XANAX) 0.5 MG tablet; TAKE 1 TABLET BY MOUTH NIGHTLY AND AS NEEDED FOR SLEEP OR  ANXIETY FOR UP TO 30 DAYS    Chronic lymphocytic thyroiditis  -     levothyroxine (SYNTHROID) 75 MCG tablet; TAKE 1 TABLET BY MOUTH EVERY DAY    Seasonal allergic rhinitis due to pollen  -     fluticasone (FLONASE) 50 MCG/ACT nasal spray; SHAKE LIQUID AND USE 1 SPRAY IN EACH NOSTRIL DAILY    Psychophysiological insomnia  -     zolpidem (AMBIEN) 10 MG tablet; TAKE 1 TABLET BY MOUTH EVERY DAY AT BEDTIME AS NEEDED FOR SLEEP    Chronic bilateral low back pain without sciatica / Chronic SI joint pain  -     traMADol (ULTRAM) 50 MG tablet; Take 1-2 tablets by mouth every 6 hours as needed for Pain for up to 60 days. If sciatic pain - take Super B complex once daily. Per ortho. Eustachian tube dysfunction  Gargle with mouthwash after meals and bedtime with head tilted back and to each side. Hold onto the sink/countertop while gargling due to possible worsening of the vertigo when tilting head. Go to am and bedtime when symptoms resolve for prevention. Vitamin C 500 mg twice daily till resolves then taper down to 250 mg daily. Patient Education        Eustachian Tube Problems: Care Instructions  Your Care Instructions    The eustachian (say \"you-STAY-shee-un\") tubes run between the inside of the ears and the throat. They keep air pressure stable in the ears. If your eustachian tubes become blocked, the air pressure in your ears changes. The fluids from a cold can clog eustachian tubes, causing pain in the ears. A quick change in air pressure can cause eustachian tubes to close up. This might happen when an airplane changes altitude or when a  goes up or down underwater. Eustachian tube problems often clear up on their own or after antibiotic treatment.  If your tubes continue to be blocked, you may need surgery. Follow-up care is a key part of your treatment and safety. Be sure to make and go to all appointments, and call your doctor if you are having problems. It's also a good idea to know your test results and keep a list of the medicines you take. How can you care for yourself at home? · To ease ear pain, apply a warm washcloth or a heating pad set on low. There may be some drainage from the ear when the heat melts earwax. Put a cloth between the heat source and your skin. Do not use a heating pad with children. · If your doctor prescribed antibiotics, take them as directed. Do not stop taking them just because you feel better. You need to take the full course of antibiotics. · Your doctor may recommend over-the-counter medicine. Be safe with medicines. Oral or nasal decongestants may relieve ear pain. Avoid decongestants that are combined with antihistamines, which tend to cause more blockage. But if allergies seem to be the problem, your doctor may recommend a combination. Be careful with cough and cold medicines. Don't give them to children younger than 6, because they don't work for children that age and can even be harmful. For children 6 and older, always follow all the instructions carefully. Make sure you know how much medicine to give and how long to use it. And use the dosing device if one is included. When should you call for help? Call your doctor now or seek immediate medical care if:    · You develop sudden, complete hearing loss.     · You have severe pain or feel dizzy.     · You have new or increasing pus or blood draining from your ear.     · You have redness, swelling, or pain around or behind the ear.    Watch closely for changes in your health, and be sure to contact your doctor if:    · You do not get better after 2 weeks.     · You have any new symptoms, such as itching or a feeling of fullness in the ear. Where can you learn more?   Go to [email or web address] and sign in to your [employer] account. Enter Y822 in the [employer] box to learn more about \"Eustachian Tube Problems: Care Instructions. \"     If you do not have an account, please click on the \"Sign Up Now\" link. Current as of: March 27, 2018  Content Version: 11.9  © 5880-2609 [employer]. Care instructions adapted under license by [facility] ([facility]). If you have questions about a medical condition or this instruction, always ask your healthcare professional. [place] [address] any warranty or liability for your use of this information.

## 2019-04-22 NOTE — PROGRESS NOTES
well-nourished. No distress. HENT:   Right Ear: Tympanic membrane and external ear normal.   Left Ear: Tympanic membrane, external ear and ear canal normal.   Nose: Nose normal.   Mouth/Throat: Uvula is midline. No oral lesions. No trismus in the jaw. Normal dentition. No dental abscesses, uvula swelling or dental caries. Posterior oropharyngeal erythema (minimal irritation) present. Right canal with max. Musculoskeletal:        Lumbar back: She exhibits tenderness. She exhibits normal range of motion (unable to eval due to fear of pain), no bony tenderness, no swelling, no edema, no deformity, no pain and no spasm. Back:    Skin: She is not diaphoretic. Vitals:    04/22/19 1439   BP: 100/60   Site: Left Upper Arm   Position: Sitting   Cuff Size: Small Adult   Pulse: 87   SpO2: 98%   Weight: 105 lb 9.6 oz (47.9 kg)  Comment: shoes on   Height: 5' (1.524 m)     BP Readings from Last 3 Encounters:   04/22/19 100/60   02/12/19 108/66   11/15/18 121/79     Pulse Readings from Last 3 Encounters:   04/22/19 87   02/12/19 68   11/15/18 110     Wt Readings from Last 3 Encounters:   04/22/19 105 lb 9.6 oz (47.9 kg)   02/12/19 106 lb 12.8 oz (48.4 kg)   11/15/18 101 lb (45.8 kg)     Body mass index is 20.62 kg/m². TSHLast Updated: 1/17/2019  [facility]   Component Name Value Ref Range   TSH 2.940       Assessment:      1. Chronic SI joint pain    2. Anxiety    3. Chronic lymphocytic thyroiditis    4. Seasonal allergic rhinitis due to pollen    5. Psychophysiological insomnia    6. Chronic bilateral low back pain without sciatica          Plan:      - Counseling More Than 50% of the 25 min Appointment Time     Natasha Burnette was seen today for ear problem, anxiety and neck pain.     Diagnoses and all orders for this visit:    Anxiety  -     ALPRAZolam (XANAX) 0.5 MG tablet; TAKE 1 TABLET BY MOUTH NIGHTLY AND AS NEEDED FOR SLEEP OR  ANXIETY FOR UP TO 30 DAYS  -     Good control.  -     Continue meds and lifestyle control. Chronic lymphocytic thyroiditis  -     levothyroxine (SYNTHROID) 75 MCG tablet; TAKE 1 TABLET BY MOUTH EVERY DAY  -     Good control at last check. -     Continue meds and lifestyle control. Seasonal allergic rhinitis due to pollen  -     fluticasone (FLONASE) 50 MCG/ACT nasal spray; SHAKE LIQUID AND USE 1 SPRAY IN EACH NOSTRIL DAILY    Psychophysiological insomnia  -     zolpidem (AMBIEN) 10 MG tablet; TAKE 1 TABLET BY MOUTH EVERY DAY AT BEDTIME AS NEEDED FOR SLEEP    Chronic bilateral low back pain without sciatica / Chronic SI joint pain  -     traMADol (ULTRAM) 50 MG tablet; Take 1-2 tablets by mouth every 6 hours as needed for Pain for up to 60 days. If sciatic pain - take Super B complex once daily. Per ortho. Controlled Substances Monitoring:  RX Monitoring 4/22/2019   Attestation -   Chronic Pain Routine Monitoring Possible medication side effects, risk of tolerance/dependence & alternative treatments discussed. ;No signs of potential drug abuse or diversion identified: otherwise, see note documentation;Obtaining appropriate analgesic effect of treatment. Chronic Pain > 80 MEDD -     Eustachian tube dysfunction  Gargle with mouthwash after meals and bedtime with head tilted back and to each side. Hold onto the sink/countertop while gargling due to possible worsening of the vertigo when tilting head. Go to am and bedtime when symptoms resolve for prevention. Vitamin C 500 mg twice daily till resolves then taper down to 250 mg daily.     Patient Education   Eustachian Tube Problems: Care Instructions  Your Care Instructions      Bruno Dsouza DO

## 2019-05-31 ENCOUNTER — TELEPHONE (OUTPATIENT)
Dept: FAMILY MEDICINE CLINIC | Age: 39
End: 2019-05-31

## 2019-05-31 NOTE — TELEPHONE ENCOUNTER
Patient got a surprise birthday trip to [place]. Leaving on Sunday asking if Dr Joey Black will approve the early refill of the Xanax . Just need to call an OK to the Pharmacy.  They close at 5

## 2019-05-31 NOTE — TELEPHONE ENCOUNTER
PRINTED AND ASKED DR. VACA IF OK TO EARLY REFILL. DR. Jad Huber STATED IT WAS OK TO DO THIS UNDER THE CIRCUMSTANCE. CALLED [facility] AND SPOKE ROYAL TO AND ADVISED THEM IT WAS OK TO FILL EARLY. CALLED AND SPOKE TO PATIENT TO LET HER KNOW IT WAS DONE.  SC

## 2019-07-02 ENCOUNTER — HOSPITAL ENCOUNTER (OUTPATIENT)
Dept: PHYSICAL THERAPY | Age: 39
Setting detail: THERAPIES SERIES
Discharge: HOME | End: 2019-07-02
Payer: COMMERCIAL

## 2019-07-02 PROCEDURE — 97110 THERAPEUTIC EXERCISES: CPT | Performed by: PHYSICAL THERAPIST

## 2019-07-02 PROCEDURE — 97163 PT EVAL HIGH COMPLEX 45 MIN: CPT | Performed by: PHYSICAL THERAPIST

## 2019-07-02 PROCEDURE — 97140 MANUAL THERAPY 1/> REGIONS: CPT | Performed by: PHYSICAL THERAPIST

## 2019-07-02 PROCEDURE — 97530 THERAPEUTIC ACTIVITIES: CPT | Performed by: PHYSICAL THERAPIST

## 2019-07-02 ASSESSMENT — PAIN SCALES - GENERAL: PAINLEVEL_OUTOF10: 8

## 2019-07-09 ENCOUNTER — HOSPITAL ENCOUNTER (OUTPATIENT)
Dept: PHYSICAL THERAPY | Age: 39
Setting detail: THERAPIES SERIES
Discharge: HOME | End: 2019-07-09
Payer: COMMERCIAL

## 2019-07-09 PROCEDURE — 97110 THERAPEUTIC EXERCISES: CPT | Performed by: PHYSICAL THERAPIST

## 2019-07-09 PROCEDURE — 97530 THERAPEUTIC ACTIVITIES: CPT | Performed by: PHYSICAL THERAPIST

## 2019-07-09 PROCEDURE — 97140 MANUAL THERAPY 1/> REGIONS: CPT | Performed by: PHYSICAL THERAPIST

## 2019-07-09 ASSESSMENT — PAIN DESCRIPTION - LOCATION: LOCATION: BUTTOCKS;HIP;BACK

## 2019-07-09 ASSESSMENT — PAIN SCALES - GENERAL: PAINLEVEL_OUTOF10: 5

## 2019-07-09 NOTE — PROGRESS NOTES
Physical Therapy  Women's Health - Pelvic Floor    Daily Treatment Note  Date: 2019  Patient Name: Ann Kuhn  MRN: [id number]     :   1980    Subjective:   General  Additional Pertinent Hx: PMH per patient's report: low back pain, SI/tailbone pain, depression, smoking history, vision/eye problems, stress fracture, neck pain, hypothyroid  Referring Practitioner: Jayla Gamboa MD  PT Visit Information  Onset Date: 17  PT Insurance Information: BCBS - deductable met - allow 120 therapy visits/year  Total # of Visits Approved: 120  Total # of Visits to Date: 3  Subjective  Subjective: Patient reports feeling that stretching is helping her, feels discomfort after several hours after stretches but feels much better the next morning. Confident that the stretches are helping. Able to tolerate sitting longer with driving - better when sitting on heat. Did not take alprazolan for anxiety prior to therapy session - this medication seems to help decrease spasm. Improved tolerance to standing without pain since initiating stretches more consistently. Has been more focused on piriformis stretches and not yoga for relaxation over the past week. Planning to start IVF next week so will need to focus on external treatments beginning next week. Pain Screening  Patient Currently in Pain: Yes  Pain Assessment  Pain Assessment: 0-10  Pain Level: 5  Pain Location: Buttocks;Hip;Back  Vital Signs  Patient Currently in Pain: Yes       Treatment Activities:   Manual therapy  Soft Tissue Mobalization: Transvaginal pelvic floor muscle massage/myofascial release - severe to moderate tightness and tenderness in lateral and posterior left pelvic floor muscles, moderate to mild tightness and tenderness in right - slight improved compared to initial assessment abour 1 week ago. Tight band noted in L pelvic floor muscles.   Externally, while patient in various stretching positions for L hip, performed deep massage to piriformis/buttocks muscles followed by crosshand myofascial release to lateral thigh and buttocks. Spent 30+ minutes with manual techniques. Exercises  Exercise 1: Diphragmatic breathing - incorporated throughout all manual techniques and stretching/downtraining activities - focus on pelvic floor expansion/descent with inhalation and avoiding contraction/excessive recoil during exhalation  Exercise 2: Piriformis stretch - figure 4 - supine - hold for about 5+ minutes during deep tissue massage  Exercise 3: Lateral piriformis stretch - supine - hold for about 5+ minutes during deep tissue massage and superficial myofascial release to lateral thigh and buttocks  Exercise 5: Single knee to chest - supine - hold for about 5+ minutes during deep tissue massage  Other Activities  Other Activities: Other (see comment)  Comment: Encouraged patient to perform yoga for pelvic floor relaxation every other day and stretches twice a day on the days she does not do yoga. Discussed at length upcoming IVF and need to avoid internal/transvaginal manual techniques for some time afterwards. Reassured patient that she continue to benefit from external manual techniques and stretches as well as downtraining and relaxation. 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 Stretching and medications continue to help with spasms/pain, but she admits that she did not perform the yoga for pelvic floor relaxation much over the past week.   Suggested that patient try to perform yoga for

## 2019-07-25 ENCOUNTER — OFFICE VISIT (OUTPATIENT)
Dept: FAMILY MEDICINE CLINIC | Age: 39
End: 2019-07-25
Payer: COMMERCIAL

## 2019-07-25 VITALS
BODY MASS INDEX: 21.17 KG/M2 | DIASTOLIC BLOOD PRESSURE: 64 MMHG | WEIGHT: 107.8 LBS | HEART RATE: 90 BPM | HEIGHT: 60 IN | SYSTOLIC BLOOD PRESSURE: 110 MMHG | OXYGEN SATURATION: 98 %

## 2019-07-25 DIAGNOSIS — G89.29 CHRONIC SI JOINT PAIN: ICD-10-CM

## 2019-07-25 DIAGNOSIS — M53.3 COCCYX PAIN: ICD-10-CM

## 2019-07-25 DIAGNOSIS — R10.9 ACUTE RIGHT FLANK PAIN: ICD-10-CM

## 2019-07-25 DIAGNOSIS — G89.29 CHRONIC BILATERAL LOW BACK PAIN WITHOUT SCIATICA: ICD-10-CM

## 2019-07-25 DIAGNOSIS — M54.50 CHRONIC BILATERAL LOW BACK PAIN WITHOUT SCIATICA: ICD-10-CM

## 2019-07-25 DIAGNOSIS — E06.3 CHRONIC LYMPHOCYTIC THYROIDITIS: Chronic | ICD-10-CM

## 2019-07-25 DIAGNOSIS — Z51.81 MEDICATION MONITORING ENCOUNTER: ICD-10-CM

## 2019-07-25 DIAGNOSIS — M53.3 SACRAL PAIN: ICD-10-CM

## 2019-07-25 DIAGNOSIS — M53.3 CHRONIC SI JOINT PAIN: ICD-10-CM

## 2019-07-25 DIAGNOSIS — F41.9 ANXIETY: Primary | Chronic | ICD-10-CM

## 2019-07-25 LAB
A/G RATIO: 1.6 (ref 1.1–2.2)
ALBUMIN SERPL-MCNC: 4.6 G/DL (ref 3.4–5)
ALP BLD-CCNC: 54 U/L (ref 40–129)
ALT SERPL-CCNC: 21 U/L (ref 10–40)
ANION GAP SERPL CALCULATED.3IONS-SCNC: 13 MMOL/L (ref 3–16)
AST SERPL-CCNC: 29 U/L (ref 15–37)
BILIRUB SERPL-MCNC: <0.2 MG/DL (ref 0–1)
BUN BLDV-MCNC: 21 MG/DL (ref 7–20)
CALCIUM SERPL-MCNC: 9.8 MG/DL (ref 8.3–10.6)
CHLORIDE BLD-SCNC: 104 MMOL/L (ref 99–110)
CO2: 23 MMOL/L (ref 21–32)
CREAT SERPL-MCNC: 0.7 MG/DL (ref 0.6–1.1)
GFR AFRICAN AMERICAN: >60
GFR NON-AFRICAN AMERICAN: >60
GLOBULIN: 2.8 G/DL
GLUCOSE BLD-MCNC: 91 MG/DL (ref 70–99)
POTASSIUM SERPL-SCNC: 4.8 MMOL/L (ref 3.5–5.1)
SODIUM BLD-SCNC: 140 MMOL/L (ref 136–145)
TOTAL PROTEIN: 7.4 G/DL (ref 6.4–8.2)

## 2019-07-25 PROCEDURE — 99215 OFFICE O/P EST HI 40 MIN: CPT | Performed by: FAMILY MEDICINE

## 2019-07-25 RX ORDER — LEVOTHYROXINE SODIUM 0.07 MG/1
TABLET ORAL
Qty: 90 TABLET | Refills: 0 | Status: SHIPPED | OUTPATIENT
Start: 2019-07-25 | End: 2019-11-22 | Stop reason: SDUPTHER

## 2019-07-25 RX ORDER — ALPRAZOLAM 0.5 MG/1
TABLET ORAL
Qty: 30 TABLET | Refills: 2 | Status: SHIPPED | OUTPATIENT
Start: 2019-07-25 | End: 2019-10-07 | Stop reason: SDUPTHER

## 2019-07-25 RX ORDER — TRAMADOL HYDROCHLORIDE 50 MG/1
50-100 TABLET ORAL EVERY 6 HOURS PRN
Qty: 210 TABLET | Refills: 2 | Status: SHIPPED | OUTPATIENT
Start: 2019-08-08 | End: 2019-10-07 | Stop reason: SDUPTHER

## 2019-07-25 RX ORDER — TRAMADOL HYDROCHLORIDE 50 MG/1
TABLET ORAL
Refills: 1 | COMMUNITY
Start: 2019-07-11 | End: 2019-07-25 | Stop reason: SDUPTHER

## 2019-07-25 SDOH — ECONOMIC STABILITY: INCOME INSECURITY: HOW HARD IS IT FOR YOU TO PAY FOR THE VERY BASICS LIKE FOOD, HOUSING, MEDICAL CARE, AND HEATING?: NOT HARD AT ALL

## 2019-07-25 SDOH — ECONOMIC STABILITY: FOOD INSECURITY: WITHIN THE PAST 12 MONTHS, YOU WORRIED THAT YOUR FOOD WOULD RUN OUT BEFORE YOU GOT MONEY TO BUY MORE.: NEVER TRUE

## 2019-07-25 SDOH — ECONOMIC STABILITY: FOOD INSECURITY: WITHIN THE PAST 12 MONTHS, THE FOOD YOU BOUGHT JUST DIDN'T LAST AND YOU DIDN'T HAVE MONEY TO GET MORE.: NEVER TRUE

## 2019-07-25 NOTE — PROGRESS NOTES
note and vitals reviewed. BP Readings from Last 3 Encounters:   07/25/19 110/64   04/22/19 100/60   02/12/19 108/66     Pulse Readings from Last 3 Encounters:   07/25/19 90   04/22/19 87   02/12/19 68     Wt Readings from Last 3 Encounters:   07/25/19 107 lb 12.8 oz (48.9 kg)   04/22/19 105 lb 9.6 oz (47.9 kg)   02/12/19 106 lb 12.8 oz (48.4 kg)     Body mass index is 21.05 kg/m². Assessment:      1. Anxiety    2. Chronic lymphocytic thyroiditis    3. Chronic SI joint pain    4. Acute right flank pain    5. Chronic bilateral low back pain without sciatica    6. Coccyx pain    7. Sacral pain    8. Medication monitoring encounter          Plan:    80    [facility] was [facility] today for thyroid problem and pain. Diagnoses and all orders for this visit:    Anxiety  -     ALPRAZolam (XANAX) 0.5 MG tablet; TAKE 1 TABLET BY MOUTH NIGHTLY AND AS NEEDED FOR SLEEP OR  ANXIETY FOR UP TO 30 DAYS  -     Comprehensive Metabolic Panel; Future  Look into controlling anxiety. Check it out on the Internet. Read the information below. Chronic lymphocytic thyroiditis  -     levothyroxine (SYNTHROID) 75 MCG tablet; TAKE 1 TABLET BY MOUTH EVERY DAY  -     Good control at last check. -     Continue meds and lifestyle control. Chronic SI joint pain  -     traMADol (ULTRAM) 50 MG tablet; Take 1-2 tablets by mouth every 6 hours as needed for Pain for up to 90 days. -     Comprehensive Metabolic Panel; Future    Acute right flank pain  -     Comprehensive Metabolic Panel; Future    Chronic bilateral low back pain without sciatica  -     traMADol (ULTRAM) 50 MG tablet; Take 1-2 tablets by mouth every 6 hours as needed for Pain for up to 90 days. Controlled Substance Monitoring:  Acute and Chronic Pain Monitoring:   RX Monitoring 8/4/2019   Attestation -   Periodic Controlled Substance Monitoring Possible medication side effects, risk of tolerance/dependence & alternative treatments discussed. ;No signs of potential drug abuse or

## 2019-07-25 NOTE — PATIENT INSTRUCTIONS
Theron was seen today for thyroid problem and pain. Diagnoses and all orders for this visit:    Anxiety  -     ALPRAZolam (XANAX) 0.5 MG tablet; TAKE 1 TABLET BY MOUTH NIGHTLY AND AS NEEDED FOR SLEEP OR  ANXIETY FOR UP TO 30 DAYS  -     Comprehensive Metabolic Panel; Future  Look into controlling anxiety. Chronic lymphocytic thyroiditis  -     levothyroxine (SYNTHROID) 75 MCG tablet; TAKE 1 TABLET BY MOUTH EVERY DAY    Chronic SI joint pain  -     traMADol (ULTRAM) 50 MG tablet; Take 1-2 tablets by mouth every 6 hours as needed for Pain for up to 90 days. -     Comprehensive Metabolic Panel; Future    Acute right flank pain  -     Comprehensive Metabolic Panel; Future    Chronic bilateral low back pain without sciatica  -     traMADol (ULTRAM) 50 MG tablet; Take 1-2 tablets by mouth every 6 hours as needed for Pain for up to 90 days. Coccyx pain  -     traMADol (ULTRAM) 50 MG tablet; Take 1-2 tablets by mouth every 6 hours as needed for Pain for up to 90 days. Sacral pain  -     traMADol (ULTRAM) 50 MG tablet; Take 1-2 tablets by mouth every 6 hours as needed for Pain for up to 90 days. Medication monitoring encounter  -     Comprehensive Metabolic Panel; Future        Patient Education        Anxiety Disorder: Care Instructions  Your Care Instructions    Anxiety is a normal reaction to stress. Difficult situations can cause you to have symptoms such as sweaty palms and a nervous feeling. In an anxiety disorder, the symptoms are far more severe. Constant worry, muscle tension, trouble sleeping, nausea and diarrhea, and other symptoms can make normal daily activities difficult or impossible. These symptoms may occur for no reason, and they can affect your work, school, or social life. Medicines, counseling, and self-care can all help. Follow-up care is a key part of your treatment and safety. Be sure to make and go to all appointments, and call your doctor if you are having problems.  It's also a and steady. · Breathe in through your nose. Breathe out through either your nose or mouth. · Breathe deeply, filling up the area between your navel and your rib cage. Breathe so that your belly goes up and down. · Do not hold your breath. · Breathe like this for 5 to 10 minutes. Notice the feeling of calmness throughout your whole body. As you continue to breathe slowly and deeply, relax by doing the following for another 5 to 10 minutes:  · Tighten and relax each muscle group in your body. You can begin at your toes and work your way up to your head. · Imagine your muscle groups relaxing and becoming heavy. · Empty your mind of all thoughts. · Let yourself relax more and more deeply. · Become aware of the state of calmness that surrounds you. · When your relaxation time is over, you can bring yourself back to alertness by moving your fingers and toes and then your hands and feet and then stretching and moving your entire body. Sometimes people fall asleep during relaxation, but they usually wake up shortly afterward. · Always give yourself time to return to full alertness before you drive a car or do anything that might cause an accident if you are not fully alert. Never play a relaxation tape while you drive a car. When should you call for help? Call 911 anytime you think you may need emergency care. For example, call if:    · You feel you cannot stop from hurting yourself or someone else.   Ruben Mead the numbers for these national suicide hotlines: 9-198-770-TALK (4-817.940.6855) and 8-205-DNFWPYV (6-391.460.5042). If you or someone you know talks about suicide or feeling hopeless, get help right away.   Watch closely for changes in your health, and be sure to contact your doctor if:    · You have anxiety or fear that affects your life.     · You have symptoms of anxiety that are new or different from those you had before. Where can you learn more? Go to https://[facility]. org and

## 2019-08-06 ENCOUNTER — APPOINTMENT (OUTPATIENT)
Dept: PHYSICAL THERAPY | Age: 39
End: 2019-08-06
Payer: COMMERCIAL

## 2019-08-07 DIAGNOSIS — M53.3 SACRAL PAIN: ICD-10-CM

## 2019-08-07 DIAGNOSIS — M54.50 CHRONIC BILATERAL LOW BACK PAIN WITHOUT SCIATICA: ICD-10-CM

## 2019-08-07 DIAGNOSIS — G89.29 CHRONIC SI JOINT PAIN: ICD-10-CM

## 2019-08-07 DIAGNOSIS — M53.3 CHRONIC SI JOINT PAIN: ICD-10-CM

## 2019-08-07 DIAGNOSIS — G89.29 CHRONIC BILATERAL LOW BACK PAIN WITHOUT SCIATICA: ICD-10-CM

## 2019-08-07 DIAGNOSIS — M53.3 COCCYX PAIN: ICD-10-CM

## 2019-08-07 RX ORDER — TRAMADOL HYDROCHLORIDE 50 MG/1
TABLET ORAL
Qty: 180 TABLET | Refills: 2 | OUTPATIENT
Start: 2019-08-07

## 2019-08-08 ENCOUNTER — HOSPITAL ENCOUNTER (OUTPATIENT)
Dept: PHYSICAL THERAPY | Age: 39
Setting detail: THERAPIES SERIES
Discharge: HOME | End: 2019-08-08
Payer: COMMERCIAL

## 2019-08-08 PROCEDURE — 97110 THERAPEUTIC EXERCISES: CPT | Performed by: PHYSICAL THERAPIST

## 2019-08-08 PROCEDURE — 97530 THERAPEUTIC ACTIVITIES: CPT | Performed by: PHYSICAL THERAPIST

## 2019-08-08 PROCEDURE — 97140 MANUAL THERAPY 1/> REGIONS: CPT | Performed by: PHYSICAL THERAPIST

## 2019-08-08 NOTE — PROGRESS NOTES
compared to R - patient asking if this could be due to a previous sacral fracture at S4 - used pelvic floor model for patient to visualize where this area was and how this could be effecting her entire low back, hip, and pelvic area. Exercises  Exercise 1: Diphragmatic breathing - incorporated throughout all manual techniques and stretching/downtraining activities - focus on pelvic floor expansion/descent with inhalation and avoiding contraction/excessive recoil during exhalation - encouraged patient to be aware of pelvic floor muscle tension throughout the day and to perform deep breathing techniques   Exercise 2: Piriformis stretch - figure 4 - supine - hold for about 5+ minutes during deep tissue massage  Exercise 3: Lateral piriformis stretch - supine - hold for about 5+ minutes during deep tissue massage and superficial myofascial release to lateral thigh and buttocks  Exercise 5: Single knee to chest - supine - hold for about 5+ minutes during deep tissue massage  Other Activities  Other Activities: Other (see comment)  Comment: Patient requested to see the PF PT closer to her home in [place] so ensured patient had correct phone number to call and set up next appointment. Tentatively scheduled patient at [facility] for next week in case she was not able to make an appointment with [facility] PT, at [place]. Patient Education  reviewed pain/spasm cycle and need to control, relationship of hip and back muscles to pelvic floor/tailbone pain - emphasized importance of stretches as well as downtraining and relaxation activites with increased awareness of tension in pelvic floor muscles and use of deep breathing to assist with minimizing tension        Assessment:   Conditions Requiring Skilled Therapeutic Intervention  Body structures, Functions, Activity limitations: Decreased functional mobility ; Increased Pain(in regards to pelvic floor muscles and floor muscles; pain/spasm cycle of pelvic floor muscles  Clinical Presentation: evolving/worsening clinical symptoms which have been progressing over the last 1.5 years  Barriers to Learning: overwhelmed by circumstances and desparately seeking assistance to address her pelvic pain  REQUIRES PT FOLLOW UP: Yes  Treatment Initiated : 7/2/19 -  manual techniques for pelvic floor muscle tightness, relaxation/downtraining of pelvic floor muscles            Goals:  Short term goals  Time Frame for Short term goals: Patient will in 4-5 sessions:   Short term goal 1: Report using 1-2 behavioral modification strategies to decrease pelvic pain through mechanical changes and improved ability for downtraining and relaxation of pelvic floor muscles. - met - 8/8/19  Short term goal 2: Perform HEP for pelvic floor and core muscle groups with minimal direction from therapist as she progresses to become more independent managing her pelvic pain. Short term goal 3: Rate severity of the problem related to pelvic pain to 6 or less on scale of 0-10 with 0 being no problem and 10 being the worst. (Rated at \"8-20\"/10 on initial eval)   Short term goal 4: Rate pain at 6/10 or less during daily functional activities with improved ability to tolerate sitting, standing and light activity such as housework. Long term goals  Time Frame for Long term goals : Patient will in 8-10 sessions:   Long term goal 1: Report using 3-4 behavioral modification strategies to decrease pelvic pain through mechanical changes and improved ability for downtraining and relaxation of pelvic floor muscles. Long term goal 2: Perform HEP for pelvic floor and core muscle groups independently as she progresses to self-manage her pelvic pain.   Long term goal 3: Rate severity of the problem related to pelvic pain to 4 or less on scale of 0-10 with 0 being no problem and 10 being the worst. (Rated at \"8-20\"/10 on initial eval)   Long term goal 4: Rate pain at 4/10 or

## 2019-08-29 ENCOUNTER — HOSPITAL ENCOUNTER (OUTPATIENT)
Dept: PHYSICAL THERAPY | Age: 39
Setting detail: THERAPIES SERIES
Discharge: HOME | End: 2019-08-29
Payer: COMMERCIAL

## 2019-09-03 ENCOUNTER — HOSPITAL ENCOUNTER (OUTPATIENT)
Dept: PHYSICAL THERAPY | Age: 39
Setting detail: THERAPIES SERIES
Discharge: HOME | End: 2019-09-03
Payer: COMMERCIAL

## 2019-10-03 ENCOUNTER — HOSPITAL ENCOUNTER (OUTPATIENT)
Dept: PHYSICAL THERAPY | Age: 39
Setting detail: THERAPIES SERIES
Discharge: HOME | End: 2019-10-03
Payer: COMMERCIAL

## 2019-10-03 ENCOUNTER — PATIENT MESSAGE (OUTPATIENT)
Dept: FAMILY MEDICINE CLINIC | Age: 39
End: 2019-10-03

## 2019-10-07 ENCOUNTER — OFFICE VISIT (OUTPATIENT)
Dept: FAMILY MEDICINE CLINIC | Age: 39
End: 2019-10-07
Payer: COMMERCIAL

## 2019-10-07 VITALS
RESPIRATION RATE: 20 BRPM | HEIGHT: 60 IN | WEIGHT: 112 LBS | OXYGEN SATURATION: 99 % | BODY MASS INDEX: 21.99 KG/M2 | HEART RATE: 94 BPM | DIASTOLIC BLOOD PRESSURE: 66 MMHG | SYSTOLIC BLOOD PRESSURE: 108 MMHG

## 2019-10-07 DIAGNOSIS — M53.3 COCCYX PAIN: ICD-10-CM

## 2019-10-07 DIAGNOSIS — F51.04 PSYCHOPHYSIOLOGICAL INSOMNIA: ICD-10-CM

## 2019-10-07 DIAGNOSIS — G89.29 CHRONIC SI JOINT PAIN: Primary | ICD-10-CM

## 2019-10-07 DIAGNOSIS — Z79.899 LONG-TERM USE OF HIGH-RISK MEDICATION: ICD-10-CM

## 2019-10-07 DIAGNOSIS — M53.3 CHRONIC SI JOINT PAIN: Primary | ICD-10-CM

## 2019-10-07 DIAGNOSIS — F41.9 ANXIETY: Chronic | ICD-10-CM

## 2019-10-07 DIAGNOSIS — M54.50 CHRONIC BILATERAL LOW BACK PAIN WITHOUT SCIATICA: ICD-10-CM

## 2019-10-07 DIAGNOSIS — M53.3 SACRAL PAIN: ICD-10-CM

## 2019-10-07 DIAGNOSIS — G89.29 CHRONIC BILATERAL LOW BACK PAIN WITHOUT SCIATICA: ICD-10-CM

## 2019-10-07 LAB
AMPHETAMINE SCREEN, URINE: NEGATIVE
BARBITURATE SCREEN, URINE: NEGATIVE
BENZODIAZEPINE SCREEN, URINE: POSITIVE
BUPRENORPHINE URINE: NEGATIVE
COCAINE METABOLITE SCREEN URINE: NEGATIVE
GABAPENTIN SCREEN, URINE: NEGATIVE
MDMA URINE: NEGATIVE
METHADONE SCREEN, URINE: NEGATIVE
METHAMPHETAMINE, URINE: NEGATIVE
OPIATE SCREEN URINE: NEGATIVE
OXYCODONE SCREEN URINE: NEGATIVE
PHENCYCLIDINE SCREEN URINE: NEGATIVE
PROPOXYPHENE SCREEN, URINE: NEGATIVE
THC SCREEN, URINE: NEGATIVE
TRICYCLIC ANTIDEPRESSANTS, UR: NEGATIVE

## 2019-10-07 PROCEDURE — 99214 OFFICE O/P EST MOD 30 MIN: CPT | Performed by: FAMILY MEDICINE

## 2019-10-07 PROCEDURE — 80305 DRUG TEST PRSMV DIR OPT OBS: CPT | Performed by: FAMILY MEDICINE

## 2019-10-07 RX ORDER — TRAMADOL HYDROCHLORIDE 50 MG/1
50-100 TABLET ORAL EVERY 6 HOURS PRN
Qty: 210 TABLET | Refills: 2 | Status: SHIPPED | OUTPATIENT
Start: 2019-11-18 | End: 2019-12-30 | Stop reason: SDUPTHER

## 2019-10-07 RX ORDER — ALPRAZOLAM 0.5 MG/1
TABLET ORAL
Qty: 60 TABLET | Refills: 2 | Status: SHIPPED | OUTPATIENT
Start: 2019-10-19 | End: 2019-12-30 | Stop reason: SDUPTHER

## 2019-10-07 RX ORDER — ZOLPIDEM TARTRATE 10 MG/1
TABLET ORAL
Qty: 30 TABLET | Refills: 5 | Status: SHIPPED | OUTPATIENT
Start: 2019-10-07 | End: 2020-03-24 | Stop reason: SDUPTHER

## 2019-11-05 ENCOUNTER — PATIENT MESSAGE (OUTPATIENT)
Dept: FAMILY MEDICINE CLINIC | Age: 39
End: 2019-11-05

## 2019-11-22 DIAGNOSIS — E06.3 CHRONIC LYMPHOCYTIC THYROIDITIS: Chronic | ICD-10-CM

## 2019-11-22 RX ORDER — LEVOTHYROXINE SODIUM 0.07 MG/1
TABLET ORAL
Qty: 90 TABLET | Refills: 0 | Status: SHIPPED | OUTPATIENT
Start: 2019-11-22 | End: 2020-02-18

## 2019-11-25 ENCOUNTER — OFFICE VISIT (OUTPATIENT)
Dept: FAMILY MEDICINE CLINIC | Age: 39
End: 2019-11-25
Payer: COMMERCIAL

## 2019-11-25 VITALS
BODY MASS INDEX: 20.73 KG/M2 | HEIGHT: 61 IN | DIASTOLIC BLOOD PRESSURE: 68 MMHG | OXYGEN SATURATION: 98 % | SYSTOLIC BLOOD PRESSURE: 112 MMHG | RESPIRATION RATE: 16 BRPM | HEART RATE: 79 BPM | WEIGHT: 109.8 LBS

## 2019-11-25 DIAGNOSIS — Z00.00 WELLNESS EXAMINATION: Primary | ICD-10-CM

## 2019-11-25 PROCEDURE — 99396 PREV VISIT EST AGE 40-64: CPT | Performed by: REGISTERED NURSE

## 2019-12-30 ENCOUNTER — OFFICE VISIT (OUTPATIENT)
Dept: FAMILY MEDICINE CLINIC | Age: 39
End: 2019-12-30
Payer: COMMERCIAL

## 2019-12-30 VITALS
WEIGHT: 111.8 LBS | SYSTOLIC BLOOD PRESSURE: 110 MMHG | BODY MASS INDEX: 21.11 KG/M2 | RESPIRATION RATE: 21 BRPM | DIASTOLIC BLOOD PRESSURE: 70 MMHG | HEART RATE: 90 BPM | OXYGEN SATURATION: 97 % | HEIGHT: 61 IN

## 2019-12-30 LAB
T4 FREE: 1.1 NG/DL (ref 0.9–1.8)
TSH REFLEX: 5.23 UIU/ML (ref 0.27–4.2)

## 2019-12-30 PROCEDURE — 99214 OFFICE O/P EST MOD 30 MIN: CPT | Performed by: FAMILY MEDICINE

## 2019-12-30 RX ORDER — ALPRAZOLAM 0.5 MG/1
TABLET ORAL
Qty: 60 TABLET | Refills: 2 | Status: SHIPPED | OUTPATIENT
Start: 2020-01-10 | End: 2020-03-24 | Stop reason: SDUPTHER

## 2019-12-30 RX ORDER — AMOXICILLIN 500 MG/1
500 CAPSULE ORAL 3 TIMES DAILY
Qty: 21 CAPSULE | Refills: 0 | Status: SHIPPED | OUTPATIENT
Start: 2019-12-30 | End: 2020-01-06

## 2019-12-30 RX ORDER — TRAMADOL HYDROCHLORIDE 50 MG/1
50-100 TABLET ORAL EVERY 6 HOURS PRN
Qty: 210 TABLET | Refills: 0 | Status: SHIPPED | OUTPATIENT
Start: 2020-03-02 | End: 2020-03-24 | Stop reason: SDUPTHER

## 2019-12-30 ASSESSMENT — ENCOUNTER SYMPTOMS
CHEST TIGHTNESS: 1
SORE THROAT: 1
COUGH: 1
TROUBLE SWALLOWING: 0
SINUS PRESSURE: 1
SINUS PAIN: 1
SHORTNESS OF BREATH: 1
RHINORRHEA: 1
CHOKING: 1
VOICE CHANGE: 1
WHEEZING: 0

## 2019-12-30 NOTE — PROGRESS NOTES
Subjective:      Patient ID: Ezequiel Sotelo is a 37 y.o. female. Chief Complaint   Patient presents with    URI     PT C/O CHEST CONGESTION, DRY COUGH, SORE THROAT, LITTLE BIT OF WHEEZING, RIGHT EAR PAIN, STUFFY NOSE X 2 WEEKS. 1208  HPI      Right ear pain not hearing well out of her ear  She started having ear symptoms 1st about 12/14/19 and sinus symptoms 1 wk before Shanelle ~ 12/21/19. Cleaned ear with spray bottle of H2O2. Something came out. Started feeling better after 3-4 days. Took Nyquil and drank lots of water. Moved to chest 12/27/19. Anxiety  Is taking 1/2 pill 4x/day. Doing better with it in her system. Sleeping better since not in pain. Getting out of the house more. 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 She thinks she got sick there. 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 She is working 6.5 hr/day. Is taking Tramadol 50 mg bid and +/- later in the day if the pain is bad. Still can't sit for long. Current Outpatient Medications   Medication Sig Dispense Refill    levothyroxine (SYNTHROID) 75 MCG tablet TAKE 1 TABLET BY MOUTH EVERY DAY 90 tablet 0    traMADol (ULTRAM) 50 MG tablet Take 1-2 tablets by mouth every 6 hours as needed for Pain for up to 90 days. 210 tablet 2    ALPRAZolam (XANAX) 0.5 MG tablet TAKE 0.5-1 TABLET BY MOUTH AS NEEDED FOR SLEEP OR  ANXIETY EVERY 6 HRS FOR UP TO 2 PILLS/DAY FOR 30 DAYS 60 tablet 2    zolpidem (AMBIEN) 10 MG tablet TAKE 1 TABLET BY MOUTH EVERY DAY AT BEDTIME AS NEEDED FOR SLEEP 30 tablet 5    fluticasone (FLONASE) 50 MCG/ACT nasal spray SHAKE LIQUID AND USE 1 SPRAY IN EACH NOSTRIL DAILY 16 g 5     No current facility-administered medications for this visit. Review of Systems   Constitutional: Positive for chills, diaphoresis and fever.    HENT: Positive for congestion, ear pain, hearing loss, postnasal drip, rhinorrhea, sinus pressure, sinus pain, sore throat and voice change. Negative for ear discharge, mouth sores, sneezing, tinnitus and trouble swallowing. Respiratory: Positive for cough, choking, chest tightness and shortness of breath (in the am). Negative for wheezing. Cardiovascular: Positive for chest pain. Negative for palpitations and leg swelling. Objective:   Physical Exam  Constitutional:       General: She is not in acute distress. Appearance: She is well-developed. She is not diaphoretic. HENT:      Right Ear: Tympanic membrane, ear canal and external ear normal. No middle ear effusion. Tympanic membrane is not injected, erythematous, retracted or bulging. Left Ear: Tympanic membrane, ear canal and external ear normal.  No middle ear effusion. Tympanic membrane is not injected, erythematous, retracted or bulging. Ears:        Nose: Mucosal edema present. No rhinorrhea. Right Sinus: No maxillary sinus tenderness or frontal sinus tenderness. Left Sinus: No maxillary sinus tenderness or frontal sinus tenderness. Mouth/Throat:      Mouth: Mucous membranes are not pale, not dry and not cyanotic. Pharynx: Uvula midline. No oropharyngeal exudate, posterior oropharyngeal erythema or uvula swelling. Tonsils: No tonsillar abscesses. Eyes:      General: No scleral icterus. Right eye: No discharge. Left eye: No discharge. Conjunctiva/sclera: Conjunctivae normal.      Right eye: Right conjunctiva is not injected. No exudate. Left eye: Left conjunctiva is not injected. No exudate. Neck:      Musculoskeletal: Neck supple. Thyroid: No thyroid mass or thyromegaly. Cardiovascular:      Rate and Rhythm: Normal rate and regular rhythm. Heart sounds: Normal heart sounds. No murmur. No friction rub. No gallop. Pulmonary:      Effort: Pulmonary effort is normal. No respiratory distress. Breath sounds: Normal breath sounds.  No decreased breath sounds, wheezing, rhonchi or rales. Lymphadenopathy:      Head:      Right side of head: No submandibular or tonsillar adenopathy. Left side of head: No submandibular or tonsillar adenopathy. Cervical: No cervical adenopathy. Right cervical: No superficial, deep or posterior cervical adenopathy. Left cervical: No superficial, deep or posterior cervical adenopathy. Skin:     General: Skin is warm and dry. Coloration: Skin is not pale. Vitals:    12/30/19 1129   BP: 110/70   Site: Left Upper Arm   Position: Sitting   Cuff Size: Small Adult   Pulse: 90   Resp: 21   SpO2: 97%   Weight: 111 lb 12.8 oz (50.7 kg)  Comment: shoes on   Height: 5' 1\" (1.549 m)     BP Readings from Last 3 Encounters:   12/30/19 110/70   11/25/19 112/68   10/07/19 108/66     Pulse Readings from Last 3 Encounters:   12/30/19 90   11/25/19 79   10/07/19 94     Wt Readings from Last 3 Encounters:   12/30/19 111 lb 12.8 oz (50.7 kg)   11/25/19 109 lb 12.8 oz (49.8 kg)   10/07/19 112 lb (50.8 kg)     Body mass index is 21.12 kg/m². Assessment:      1. Chronic SI joint pain    2. Chronic bilateral low back pain without sciatica    3. Coccyx pain    4. Sacral pain    5. Anxiety    6. Acute bronchitis, unspecified organism    7. Chronic lymphocytic thyroiditis          Plan:    5680  Kashif Truong was seen today for uri. Diagnoses and all orders for this visit:    Chronic SI joint pain  -     traMADol (ULTRAM) 50 MG tablet; Take 1-2 tablets by mouth every 6 hours as needed for Pain for up to 30 days. Controlled Substance Monitoring:  Acute and Chronic Pain Monitoring:   RX Monitoring 1/11/2020   Attestation -   Periodic Controlled Substance Monitoring Possible medication side effects, risk of tolerance/dependence & alternative treatments discussed. ;No signs of potential drug abuse or diversion identified. ;Assessed functional status. ;Obtaining appropriate analgesic effect of treatment.    Chronic Pain > 80 MEDD -   Patient has better pain control now. Has been able to get back to work. Chronic bilateral low back pain without sciatica  -     traMADol (ULTRAM) 50 MG tablet; Take 1-2 tablets by mouth every 6 hours as needed for Pain for up to 30 days. Coccyx pain  -     traMADol (ULTRAM) 50 MG tablet; Take 1-2 tablets by mouth every 6 hours as needed for Pain for up to 30 days. Sacral pain  -     traMADol (ULTRAM) 50 MG tablet; Take 1-2 tablets by mouth every 6 hours as needed for Pain for up to 30 days. Anxiety  -     ALPRAZolam (XANAX) 0.5 MG tablet; TAKE 0.5-1 TABLET BY MOUTH AS NEEDED FOR SLEEP OR  ANXIETY EVERY 6 HRS FOR UP TO 2 PILLS/DAY FOR 30 DAYS    Acute bronchitis, unspecified organism  -     amoxicillin (AMOXIL) 500 MG capsule; Take 1 capsule by mouth 3 times daily for 7 days                                           Instructions for Respiratory Infections (SAVE THIS SHEET)    For the first 7-14 days of symptoms follow instructions below, even before being seen in the office or even during treatment with antibiotics, until symptom free. 1. Water: Drink 1 ounce of water for every 2 pounds of body weight for adults, 50 Ounces of water per day. This will loosen mucus in the head and chest & improve the weak feeling of dehydration, allow the body to get germ fighting resources to the infection. Half can be juice or sugar free Crystal Light. Don't count drinks with caffeine or carbonation. Infants can have Pedialyte liquid or freezer pops. Avoid salt if you have high Blood Pressure, swelling in the feet or ankles or have heart problems. 2. Humidity: Humidify the air to 35-50% ( or until the windows fog over slightly). Can use a humidifier, vaporizer, boil water on the stove or put a coffee can full of water on the heater vents. This will loosen mucus from infections and allergies. 3. Sleep: Get 8-10 hours a night and rest during the evening after work or school.  If you have trouble sleeping, adults can take Melatonin 5mg up to 2 tabs at bedtime ( not for children or pregnant women). If Mono is suspected then sleep during 9PM to 9AM time span (if possible.)  4. Cough: Take cough medicines with Guaifenesin ( to loosen chest or head congestion) and Dextromethorphan ( to decrease excess cough). Robitussin D.M. Syrup every 4-6 hrs or Mucinex D. M. pills twice a day. Use the pediatric formulations for children over 6 months making sure they are alcohol & sugar free for children, pregnant women, and diabetics. 5. Pain And Fevers: Take Acetaminophen ( Tylenol) for fevers, aches, and headaches. 2-500 mg every 8 hours for adults. Appropriate doses at bedtime for children may help them sleep better. If pregnant take 1 -500 mg (Tylenol) every 8 hours as needed. Ibuprofen may be used if not pregnant, but should be given with food to avoid nausea. Avoid Ibuprofen if you have high blood pressure, CHF, or kidney problems. 6.Gargle: (DAY ONE OF SYMPTOMS) Gargle in the back of the throat with the head tilted back and to the sides with a strong mouthwash  ( Listerine or Scope) after meals and at bedtime at least 4 -5 times a day. This helps kill bacteria and viruses in the back of the throat and will shorten the duration and decrease the severity of your symptoms: sore throat, cough, ear popping,/ear pain, and possibly dizziness. 7. Smoking: Avoid smoking or exposure to second hand smoke. 8. Zinc: (DAY ONE OF SYMPTOMS)  Zinc lozenges such as Cold Melchor (available most stores), or Basic ([employer] brand) will help shorten the duration and lessen symptoms such as sore throat, cough, nasal congestion, runny nose, and post nasal drip. Use 1 lozenge every 2-4 hours ( after meals if stomach is sensitive). Children can use 10-15 mg or less 3-4 times a day or Zinc lollypops. In pregnancy limit to 50-60 mg a day for 7 days as prenatals have Zinc also. With diarrhea use zinc pills 50 mg 1/2 to 1 pill 2x/day.   9. Vitamins: Vitamin C 500 mg with breakfast and dinner. Children and pregnant women should drink citrus juices. This speeds healing and strengthens immune system. 10. Chest Symptoms: Vicks Vapor rub to the chest at bedtime. 11. Decongestants: Avoid all decongestants and antihistamine cold preparations in children. Try all of the above starting with day 1 of symptoms. If Strep throat symptoms appear call to be seen in the office as soon as possible and don't gargle on that day. Newborns, infants, or anyone with earaches or influenza may need to be seen quickly. Adults with fevers over 103 degrees or shortness of breath should call the office immediately. eustachian tube dysfunction  Gargle with mouthwash after meals and bedtime with head tilted back and to each side. Hold onto the sink/countertop while gargling due to possible worsening of the vertigo when tilting head. Go to am and bedtime when symptoms resolve for prevention. Vitamin C 500 mg twice daily till resolves then taper down to 250 mg daily.       Jag Campos, DO

## 2019-12-30 NOTE — PATIENT INSTRUCTIONS
infections and allergies. 3. Sleep: Get 8-10 hours a night and rest during the evening after work or school. If you have trouble sleeping, adults can take Melatonin 5mg up to 2 tabs at bedtime ( not for children or pregnant women). If Mono is suspected then sleep during 9PM to 9AM time span (if possible.)  4. Cough: Take cough medicines with Guaifenesin ( to loosen chest or head congestion) and Dextromethorphan ( to decrease excess cough). Robitussin D.M. Syrup every 4-6 hrs or Mucinex D. M. pills twice a day. Use the pediatric formulations for children over 6 months making sure they are alcohol & sugar free for children, pregnant women, and diabetics. 5. Pain And Fevers: Take Acetaminophen ( Tylenol) for fevers, aches, and headaches. 2-500 mg every 8 hours for adults. Appropriate doses at bedtime for children may help them sleep better. If pregnant take 1 -500 mg (Tylenol) every 8 hours as needed. Ibuprofen may be used if not pregnant, but should be given with food to avoid nausea. Avoid Ibuprofen if you have high blood pressure, CHF, or kidney problems. 6.Gargle: (DAY ONE OF SYMPTOMS) Gargle in the back of the throat with the head tilted back and to the sides with a strong mouthwash  ( Listerine or Scope) after meals and at bedtime at least 4 -5 times a day. This helps kill bacteria and viruses in the back of the throat and will shorten the duration and decrease the severity of your symptoms: sore throat, cough, ear popping,/ear pain, and possibly dizziness. 7. Smoking: Avoid smoking or exposure to second hand smoke. 8. Zinc: (DAY ONE OF SYMPTOMS)  Zinc lozenges such as Cold Melchor (available most stores), or Basic ([employer] brand) will help shorten the duration and lessen symptoms such as sore throat, cough, nasal congestion, runny nose, and post nasal drip. Use 1 lozenge every 2-4 hours ( after meals if stomach is sensitive). Children can use 10-15 mg or less 3-4 times a day or Zinc lollypops.  In pregnancy limit to 50-60 mg a day for 7 days as prenatals have Zinc also. With diarrhea use zinc pills 50 mg 1/2 to 1 pill 2x/day. 9. Vitamins: Vitamin C 500 mg with breakfast and dinner. Children and pregnant women should drink citrus juices. This speeds healing and strengthens immune system. 10. Chest Symptoms: Vicks Vapor rub to the chest at bedtime. 11. Decongestants: Avoid all decongestants and antihistamine cold preparations in children. Try all of the above starting with day 1 of symptoms. If Strep throat symptoms appear call to be seen in the office as soon as possible and don't gargle on that day. Newborns, infants, or anyone with earaches or influenza may need to be seen quickly. Adults with fevers over 103 degrees or shortness of breath should call the office immediately. eustachian tube dysfunction  Gargle with mouthwash after meals and bedtime with head tilted back and to each side. Hold onto the sink/countertop while gargling due to possible worsening of the vertigo when tilting head. Go to am and bedtime when symptoms resolve for prevention. Vitamin C 500 mg twice daily till resolves then taper down to 250 mg daily.

## 2020-01-08 ENCOUNTER — PATIENT MESSAGE (OUTPATIENT)
Dept: FAMILY MEDICINE CLINIC | Age: 40
End: 2020-01-08

## 2020-01-08 NOTE — TELEPHONE ENCOUNTER
From: Shawn Barajas  To: Severiano Crick, DO  Sent: 1/8/2020 2:07 AM EST  Subject: Test Results Question    Hello,  I received the results for the TSH screen for my thyroid. The number was above the standard range. Does this suggest a medication adjustment? Thank you .   Lauro Duke

## 2020-01-16 RX ORDER — FLUTICASONE PROPIONATE 50 MCG
SPRAY, SUSPENSION (ML) NASAL
Qty: 16 G | Refills: 3 | Status: SHIPPED | OUTPATIENT
Start: 2020-01-16 | End: 2021-02-08 | Stop reason: SDUPTHER

## 2020-02-18 RX ORDER — LEVOTHYROXINE SODIUM 0.07 MG/1
TABLET ORAL
Qty: 90 TABLET | Refills: 0 | Status: SHIPPED | OUTPATIENT
Start: 2020-02-18 | End: 2020-05-13

## 2020-03-24 ENCOUNTER — OFFICE VISIT (OUTPATIENT)
Dept: FAMILY MEDICINE CLINIC | Age: 40
End: 2020-03-24
Payer: COMMERCIAL

## 2020-03-24 VITALS
RESPIRATION RATE: 18 BRPM | OXYGEN SATURATION: 98 % | WEIGHT: 107 LBS | HEART RATE: 85 BPM | HEIGHT: 61 IN | DIASTOLIC BLOOD PRESSURE: 70 MMHG | BODY MASS INDEX: 20.2 KG/M2 | SYSTOLIC BLOOD PRESSURE: 112 MMHG

## 2020-03-24 PROCEDURE — 99214 OFFICE O/P EST MOD 30 MIN: CPT | Performed by: FAMILY MEDICINE

## 2020-03-24 RX ORDER — ZOLPIDEM TARTRATE 10 MG/1
TABLET ORAL
Qty: 30 TABLET | Refills: 5 | Status: SHIPPED | OUTPATIENT
Start: 2020-03-24 | End: 2020-08-30 | Stop reason: SDUPTHER

## 2020-03-24 RX ORDER — TRAMADOL HYDROCHLORIDE 50 MG/1
50-100 TABLET ORAL EVERY 6 HOURS PRN
Qty: 210 TABLET | Refills: 2 | Status: SHIPPED | OUTPATIENT
Start: 2020-03-24 | End: 2020-06-19 | Stop reason: SDUPTHER

## 2020-03-24 RX ORDER — ALPRAZOLAM 0.5 MG/1
TABLET ORAL
Qty: 60 TABLET | Refills: 2 | Status: SHIPPED | OUTPATIENT
Start: 2020-03-24 | End: 2020-06-19 | Stop reason: SDUPTHER

## 2020-03-24 ASSESSMENT — PATIENT HEALTH QUESTIONNAIRE - PHQ9
1. LITTLE INTEREST OR PLEASURE IN DOING THINGS: 0
SUM OF ALL RESPONSES TO PHQ9 QUESTIONS 1 & 2: 0
SUM OF ALL RESPONSES TO PHQ QUESTIONS 1-9: 0
SUM OF ALL RESPONSES TO PHQ QUESTIONS 1-9: 0
2. FEELING DOWN, DEPRESSED OR HOPELESS: 0

## 2020-03-24 ASSESSMENT — ENCOUNTER SYMPTOMS
SHORTNESS OF BREATH: 1
CHEST TIGHTNESS: 0

## 2020-03-24 NOTE — PATIENT INSTRUCTIONS
Mady Mancera was seen today for anxiety and back pain. Diagnoses and all orders for this visit:    Chronic SI joint pain / Chronic bilateral low back pain without sciatica / Coccyx pain / Sacral pain  -     traMADol (ULTRAM) 50 MG tablet; Take 1-2 tablets by mouth every 6 hours as needed for Pain for up to 30 days. Use sparingly. Anxiety  -     ALPRAZolam (XANAX) 0.5 MG tablet; TAKE 0.5-1 TABLET BY MOUTH AS NEEDED FOR SLEEP OR  ANXIETY EVERY 6 HRS FOR UP TO 2 PILLS/DAY FOR 30 DAYS  Use sparingly    Psychophysiological insomnia  -     zolpidem (AMBIEN) 10 MG tablet; TAKE 1 TABLET BY MOUTH EVERY DAY AT BEDTIME AS NEEDED FOR SLEEP  Continue nightly. Increase exercise as able. Preventing the Spread of Coronavirus Disease 2019 in Homes and Residential Communities   For the most recent information go to [employer]    Prevention steps for People with confirmed or suspected COVID-19 (including persons under investigation) who do not need to be hospitalized  and   People with confirmed COVID-19 who were hospitalized and determined to be medically stable to go home    Your healthcare provider and public health staff will evaluate whether you can be cared for at home. If it is determined that you do not need to be hospitalized and can be isolated at home, you will be monitored by staff from your local or state health department. You should follow the prevention steps below until a healthcare provider or local or state health department says you can return to your normal activities. Stay home except to get medical care  People who are mildly ill with COVID-19 are able to isolate at home during their illness. You should restrict activities outside your home, except for getting medical care. Do not go to work, school, or public areas. Avoid using public transportation, ride-sharing, or taxis.   Separate yourself from other people and animals in your often with soap and water for at least 20 seconds, especially after blowing your nose, coughing, or sneezing; going to the bathroom; and before eating or preparing food. If soap and water are not readily available, use an alcohol-based hand  with at least 60% alcohol, covering all surfaces of your hands and rubbing them together until they feel dry. Soap and water are the best option if hands are visibly dirty. Avoid touching your eyes, nose, and mouth with unwashed hands. Avoid sharing personal household items  You should not share dishes, drinking glasses, cups, eating utensils, towels, or bedding with other people or pets in your home. After using these items, they should be washed thoroughly with soap and water. Clean all high-touch surfaces everyday  High touch surfaces include counters, tabletops, doorknobs, bathroom fixtures, toilets, phones, keyboards, tablets, and bedside tables. Also, clean any surfaces that may have blood, stool, or body fluids on them. Use a household cleaning spray or wipe, according to the label instructions. Labels contain instructions for safe and effective use of the cleaning product including precautions you should take when applying the product, such as wearing gloves and making sure you have good ventilation during use of the product. Monitor your symptoms  Seek prompt medical attention if your illness is worsening (e.g., difficulty breathing). Before seeking care, call your healthcare provider and tell them that you have, or are being evaluated for, COVID-19. Put on a facemask before you enter the facility. These steps will help the healthcare providers office to keep other people in the office or waiting room from getting infected or exposed. Ask your healthcare provider to call the local or [facility] health department.  Persons who are placed under active monitoring or facilitated self-monitoring should follow instructions provided by their local health department adults can take Melatonin 5mg up to 2 tabs at bedtime ( not for children or pregnant women). If Mono is suspected then sleep during 9PM to 9AM time span (if possible.)  4. Cough: Take cough medicines with Guaifenesin ( to loosen chest or head congestion) and Dextromethorphan ( to decrease excess cough). Robitussin D.M. Syrup every 4-6 hrs OR Mucinex D. M. pills OR Delsym DM syrup twice a day. Use the pediatric formulations for children over 6 months making sure they are alcohol & sugar free for children, pregnant women, and diabetics. 5. Pain And Fevers: Take Acetaminophen ( Tylenol) for fevers, aches, and headaches. 2-500 mg every 8 hours for adults. Appropriate doses at bedtime for children may help them sleep better. If pregnant take 1 -500 mg (Tylenol) every 8 hours as needed. Ibuprofen may be used if not pregnant, but should be given with food to avoid nausea. Avoid Ibuprofen if you have acid reflux, high blood pressure, CHF, or kidney problems. 6.Gargle: (DAY ONE OF SYMPTOMS) Gargle in the back of the throat with the head tilted back and to the sides with a strong mouthwash  ( Listerine or Scope) after meals and at bedtime at least 4 -5 times a day. This helps kill bacteria and viruses in the back of the throat and will shorten the duration and decrease the severity of your symptoms: sore throat, cough, ear popping,/ear pain, and possibly dizziness. 7. Smoking: Avoid smoking or exposure to second hand smoke. 8. Zinc: (DAY ONE OF SYMPTOMS)  Zinc lozenges such as Cold Melchor (available most stores), or Basic ([employer] brand) will help shorten the duration and lessen symptoms such as sore throat, cough, nasal congestion, runny nose, and post nasal drip. Use 1 lozenge every 2-4 hours ( after meals if stomach is sensitive). Children can use 10-15 mg or less 3-4 times a day or Zinc lollypops. In pregnancy limit to 50-60 mg a day for 7 days as prenatals have Zinc also.   With diarrhea use zinc pills 50 mg 1/2 to 1 pill

## 2020-03-24 NOTE — PROGRESS NOTES
BY MOUTH AS NEEDED FOR SLEEP OR  ANXIETY EVERY 6 HRS FOR UP TO 2 PILLS/DAY FOR 30 DAYS Yes Stuart Shaikh DO   zolpidem (AMBIEN) 10 MG tablet TAKE 1 TABLET BY MOUTH EVERY DAY AT BEDTIME AS NEEDED FOR SLEEP Yes Genoveva Acevedo DO     Review of Systems   Constitutional: Positive for fatigue. Respiratory: Positive for shortness of breath. Negative for chest tightness. Cardiovascular: Negative for chest pain and palpitations. Neurological: Positive for tremors, weakness and light-headedness. Negative for numbness. Psychiatric/Behavioral: Positive for agitation, decreased concentration and sleep disturbance. Negative for dysphoric mood, self-injury and suicidal ideas. The patient is nervous/anxious. Objective:   Physical Exam  Vitals signs and nursing note reviewed. Constitutional:       General: She is not in acute distress. Appearance: Normal appearance. She is well-developed. She is not ill-appearing or diaphoretic. Comments: Patient wearing an N 95 mask. Eyes:      General: No scleral icterus. Right eye: No discharge. Left eye: No discharge. Conjunctiva/sclera: Conjunctivae normal.   Neck:      Musculoskeletal: Neck supple. Thyroid: No thyroid mass or thyromegaly. Vascular: No carotid bruit or JVD. Trachea: Trachea and phonation normal. No tracheal deviation. Cardiovascular:      Rate and Rhythm: Normal rate and regular rhythm. No extrasystoles are present. Chest Wall: No thrill. Heart sounds: Normal heart sounds, S1 normal and S2 normal. Heart sounds not distant. No murmur. No systolic murmur. No diastolic murmur. No friction rub. No gallop. No S3 or S4 sounds. Pulmonary:      Effort: Pulmonary effort is normal. No respiratory distress. Breath sounds: Normal breath sounds. No decreased breath sounds, wheezing, rhonchi or rales. Musculoskeletal:      Lumbar back: She exhibits decreased range of motion and pain.  She exhibits no tenderness, no bony tenderness, no swelling, no edema, no deformity and no spasm. Comments: Pain with extension. Lymphadenopathy:      Head:      Right side of head: No submandibular or tonsillar adenopathy. Left side of head: No submandibular or tonsillar adenopathy. Cervical: No cervical adenopathy. Right cervical: No superficial, deep or posterior cervical adenopathy. Left cervical: No superficial, deep or posterior cervical adenopathy. Skin:     General: Skin is warm and dry. Coloration: Skin is not pale. Nails: There is no clubbing. Neurological:      Mental Status: She is alert. Deep Tendon Reflexes:      Reflex Scores:       Patellar reflexes are 2+ on the right side and 2+ on the left side. Psychiatric:         Attention and Perception: Attention and perception normal. She is attentive. She does not perceive auditory or visual hallucinations. Mood and Affect: Affect normal. Mood is anxious. Mood is not depressed or elated. Affect is not labile, blunt, flat, angry, tearful or inappropriate. Speech: Speech normal. She is communicative. Speech is not rapid and pressured, delayed, slurred or tangential.         Behavior: Behavior normal. Behavior is not agitated, slowed, aggressive, withdrawn, hyperactive or combative. Behavior is cooperative. Cognition and Memory: Cognition and memory normal.         Judgment: Judgment normal. Judgment is not impulsive or inappropriate.        Vitals:    03/24/20 1306   BP: 112/70   Site: Left Upper Arm   Position: Sitting   Cuff Size: Small Adult   Pulse: 85   Resp: 18   SpO2: 98%   Weight: 107 lb (48.5 kg)  Comment: shoes on   Height: 5' 1\" (1.549 m)     BP Readings from Last 3 Encounters:   03/24/20 112/70   12/30/19 110/70   11/25/19 112/68     Pulse Readings from Last 3 Encounters:   03/24/20 85   12/30/19 90   11/25/19 79     Wt Readings from Last 3 Encounters:   03/24/20 107 lb (48.5 kg)   12/30/19 kidney problems. 6.Gargle: (DAY ONE OF SYMPTOMS) Gargle in the back of the throat with the head tilted back and to the sides with a strong mouthwash  ( Listerine or Scope) after meals and at bedtime at least 4 -5 times a day. This helps kill bacteria and viruses in the back of the throat and will shorten the duration and decrease the severity of your symptoms: sore throat, cough, ear popping,/ear pain, and possibly dizziness. 7. Smoking: Avoid smoking or exposure to second hand smoke. 8. Zinc: (DAY ONE OF SYMPTOMS)  Zinc lozenges such as Cold Melchor (available most stores), or Basic ([employer] brand) will help shorten the duration and lessen symptoms such as sore throat, cough, nasal congestion, runny nose, and post nasal drip. Use 1 lozenge every 2-4 hours ( after meals if stomach is sensitive). Children can use 10-15 mg or less 3-4 times a day or Zinc lollypops. In pregnancy limit to 50-60 mg a day for 7 days as prenatals have Zinc also. With diarrhea use zinc pills 50 mg 1/2 to 1 pill 2x/day. 9. Vitamins: Vitamin C 500 mg with breakfast and dinner. Children and pregnant women should drink citrus juices. This speeds healing and strengthens immune system. 10. Chest Symptoms: Vicks Vapor rub to the chest at bedtime. 11. Decongestants: Avoid all decongestants and antihistamine cold preparations in children. Decongestants should always be avoided in people with high blood pressure, palpitations, heart disease and those on stimulant medications. 12. Frequent hand washing and/or hand gel especially after coughing or sneezing into the hands or blowing the nose to help prevent spreading to others. Use kleenex and NOT handkerchiefs. Try all of the above starting with day 1 of symptoms. If Strep throat symptoms appear call to be seen in the office as soon as possible and don't gargle on that day. Newborns, infants, or anyone with earaches or influenza may need to be seen quickly.  Adults with fevers over 103 degrees or

## 2020-05-13 RX ORDER — LEVOTHYROXINE SODIUM 0.07 MG/1
TABLET ORAL
Qty: 90 TABLET | Refills: 0 | Status: SHIPPED | OUTPATIENT
Start: 2020-05-13 | End: 2020-07-23

## 2020-06-11 ENCOUNTER — TELEPHONE (OUTPATIENT)
Dept: FAMILY MEDICINE CLINIC | Age: 40
End: 2020-06-11

## 2020-06-11 ENCOUNTER — OFFICE VISIT (OUTPATIENT)
Dept: PRIMARY CARE CLINIC | Age: 40
End: 2020-06-11
Payer: COMMERCIAL

## 2020-06-11 VITALS — OXYGEN SATURATION: 98 % | HEART RATE: 80 BPM | TEMPERATURE: 98.3 F

## 2020-06-11 PROCEDURE — 99213 OFFICE O/P EST LOW 20 MIN: CPT | Performed by: FAMILY MEDICINE

## 2020-06-11 NOTE — PATIENT INSTRUCTIONS
bathroom, if available. Animals: You should restrict contact with pets and other animals while you are sick with COVID-19, just like you would around other people. Although there have not been reports of pets or other animals becoming sick with COVID-19, it is still recommended that people sick with COVID-19 limit contact with animals until more information is known about the virus. When possible, have another member of your household care for your animals while you are sick. If you are sick with COVID-19, avoid contact with your pet, including petting, snuggling, being kissed or licked, and sharing food. If you must care for your pet or be around animals while you are sick, wash your hands before and after you interact with pets and wear a facemask. Call ahead before visiting your doctor  If you have a medical appointment, call the healthcare provider and tell them that you have or may have COVID-19. This will help the healthcare providers office take steps to keep other people from getting infected or exposed. Wear a facemask  You should wear a facemask when you are around other people (e.g., sharing a room or vehicle) or pets and before you enter a healthcare providers office. If you are not able to wear a facemask (for example, because it causes trouble breathing), then people who live with you should not stay in the same room with you, or they should wear a facemask if they enter your room. Cover your coughs and sneezes  Cover your mouth and nose with a tissue when you cough or sneeze. Throw used tissues in a lined trash can. Immediately wash your hands with soap and water for at least 20 seconds or, if soap and water are not available, clean your hands with an alcohol-based hand  that contains at least 60% alcohol.   Clean your hands often  Wash your hands often with soap and water for at least 20 seconds, especially after blowing your nose, coughing, or sneezing; going to the bathroom; and before eating or preparing food. If soap and water are not readily available, use an alcohol-based hand  with at least 60% alcohol, covering all surfaces of your hands and rubbing them together until they feel dry. Soap and water are the best option if hands are visibly dirty. Avoid touching your eyes, nose, and mouth with unwashed hands. Avoid sharing personal household items  You should not share dishes, drinking glasses, cups, eating utensils, towels, or bedding with other people or pets in your home. After using these items, they should be washed thoroughly with soap and water. Clean all high-touch surfaces everyday  High touch surfaces include counters, tabletops, doorknobs, bathroom fixtures, toilets, phones, keyboards, tablets, and bedside tables. Also, clean any surfaces that may have blood, stool, or body fluids on them. Use a household cleaning spray or wipe, according to the label instructions. Labels contain instructions for safe and effective use of the cleaning product including precautions you should take when applying the product, such as wearing gloves and making sure you have good ventilation during use of the product. Monitor your symptoms  Seek prompt medical attention if your illness is worsening (e.g., difficulty breathing). Before seeking care, call your healthcare provider and tell them that you have, or are being evaluated for, COVID-19. Put on a facemask before you enter the facility. These steps will help the healthcare providers office to keep other people in the office or waiting room from getting infected or exposed. Ask your healthcare provider to call the local or state health department. Persons who are placed under active monitoring or facilitated self-monitoring should follow instructions provided by their local health department or occupational health professionals, as appropriate. When working with your local health department check their available hours.   If you have a medical emergency and need to call 911, notify the dispatch personnel that you have, or are being evaluated for COVID-19. If possible, put on a facemask before emergency medical services arrive. Discontinuing home isolation  Patients with confirmed COVID-19 should remain under home isolation precautions until the risk of secondary transmission to others is thought to be low. The decision to discontinue home isolation precautions should be made on a case-by-case basis, in consultation with healthcare providers and state and local health departments. Thank you for enrolling in [address] Please follow the instructions below to securely access your online medical record. [facility] allows you to send messages to your doctor, view your test results, renew your prescriptions, schedule appointments, and more. How Do I Sign Up? 1. In your Internet browser, go to [email or web address]  2. Click on the Sign Up Now link in the Sign In box. You will see the New Member Sign Up page. 3. Enter your [facility] Access Code exactly as it appears below. You will not need to use this code after youve completed the sign-up process. If you do not sign up before the expiration date, you must request a new code. [employer] Access Code: Activation code not generated  Current [facility] Status: Active    4. Enter your Social Security Number ([id number]) and Date of Birth (mm/dd/yyyy) as indicated and click Submit. You will be taken to the next sign-up page. 5. Create a [facility] ID. This will be your [facility] login ID and cannot be changed, so think of one that is secure and easy to remember. 6. Create a [facility] password. You can change your password at any time. 7. Enter your Password Reset Question and Answer. This can be used at a later time if you forget your password. 8. Enter your e-mail address. You will receive e-mail notification when new information is available in [address] 9. Click Sign Up. You can now view your medical record. Additional Information  If you have questions, please contact your physician practice where you receive care. Remember, [facility] is NOT to be used for urgent needs. For medical emergencies, dial 911.

## 2020-06-12 ENCOUNTER — TELEPHONE (OUTPATIENT)
Dept: FAMILY MEDICINE CLINIC | Age: 40
End: 2020-06-12

## 2020-06-14 LAB
SARS-COV-2: NOT DETECTED
SOURCE: NORMAL

## 2020-06-15 ENCOUNTER — TELEPHONE (OUTPATIENT)
Dept: FAMILY MEDICINE CLINIC | Age: 40
End: 2020-06-15

## 2020-06-19 ENCOUNTER — TELEMEDICINE (OUTPATIENT)
Dept: FAMILY MEDICINE CLINIC | Age: 40
End: 2020-06-19
Payer: COMMERCIAL

## 2020-06-19 PROCEDURE — 99214 OFFICE O/P EST MOD 30 MIN: CPT | Performed by: FAMILY MEDICINE

## 2020-06-19 RX ORDER — TRAMADOL HYDROCHLORIDE 50 MG/1
50-100 TABLET ORAL EVERY 6 HOURS PRN
Qty: 210 TABLET | Refills: 2 | Status: SHIPPED | OUTPATIENT
Start: 2020-06-19 | End: 2020-09-11 | Stop reason: SDUPTHER

## 2020-06-19 RX ORDER — TRAMADOL HYDROCHLORIDE 50 MG/1
50 TABLET ORAL EVERY 6 HOURS PRN
Status: CANCELLED | OUTPATIENT
Start: 2020-06-19

## 2020-06-19 RX ORDER — TRAMADOL HYDROCHLORIDE 50 MG/1
TABLET ORAL
COMMUNITY
Start: 2020-05-22 | End: 2020-06-19 | Stop reason: SDUPTHER

## 2020-06-19 RX ORDER — ALPRAZOLAM 0.5 MG/1
TABLET ORAL
Qty: 60 TABLET | Refills: 2 | Status: SHIPPED | OUTPATIENT
Start: 2020-06-19 | End: 2020-09-11 | Stop reason: SDUPTHER

## 2020-06-19 NOTE — PROGRESS NOTES
2020    TELEHEALTH EVALUATION -- Audio/Visual (During VOVKD-29 public health emergency)  Chief Complaint   Patient presents with    Anxiety     ROUTINE FOLLOW UP FOR ANXIETY- MEDICATION REFILL     Pain     ROUTINE FOLLOW UP FOR PAIN MAINAGEMNET- MEDICATION REFILL    411  HPI:    Xiang Dsouza (:  1980) has requested an audio/video evaluation for the following concern(s):    Chronic bilateral low back pain without sciatica  Coccyx pain  Chronic SI joint pain  Sacral pain  She is walking down the pain. Walks on treadmill 4x/wk 20-25 min. 3/24/20. Now 30 min 4x/wk 1-2 mph   Doing pelvic floor exercises 4 times a week. Do the same 1 every day. She was getting pelvic floor therapy for 6 months. It was for the sacrum. Pelvic floor muscles were very tight. Was feeling a lot of results from physical therapy in the very beginning. Then it plateaued. With the medication she can manage it. It is not going away on its own. Has not gotten 100% better. Has bilateral hip pain with coccyx pain. Anxiety  She takes 1/2 pill of Xanax 0.5 mg 4 times daily. Also helps her sacral spasms. No side effects with the Xanax. Review of Systems    Prior to Visit Medications    Medication Sig Taking?  Authorizing Provider   traMADol (ULTRAM) 50 MG tablet TAKE 1 2 TABS BY MOUTH EVERY 6 HOURS AS NEEDED FOR PIAN FOR UP TO 30 DAYS Yes Historical Provider, MD   levothyroxine (SYNTHROID) 75 MCG tablet TAKE 1 TABLET BY MOUTH EVERY DAY Yes Stuart Shaikh, DO   ALPRAZolam (XANAX) 0.5 MG tablet TAKE 0.5-1 TABLET BY MOUTH AS NEEDED FOR SLEEP OR  ANXIETY EVERY 6 HRS FOR UP TO 2 PILLS/DAY FOR 30 DAYS Yes Stuart Shaikh, DO   zolpidem (AMBIEN) 10 MG tablet TAKE 1 TABLET BY MOUTH EVERY DAY AT BEDTIME AS NEEDED FOR SLEEP Yes Veronica Shaikh, DO   fluticasone (FLONASE) 50 MCG/ACT nasal spray SHAKE LIQUID AND USE 1 SPRAY IN EACH NOSTRIL DAILY Yes Adonis Soto, DO     No Known Allergies  Social History     Tobacco Use    Smoking status: Former Smoker     Years: 15.00     Types: Cigarettes     Last attempt to quit: 4/3/2012     Years since quittin.2    Smokeless tobacco: Never Used    Tobacco comment: Sometime no smoking; Educated in cessation. Substance Use Topics    Alcohol use: No     Comment: Quit drinking 2012. Was drinking 8-10 beers every other day for 2 months. Rarely drinks (holidays) then just a couple.  Drug use: No        Past Medical History:   Diagnosis Date    Cervical stenosis (uterine cervix) 2014    Chronic lymphocytic thyroiditis     DDD (degenerative disc disease), lumbar 04/10/2018    ? L3-4     Depression     Drug overdose, intentional ([facility] [address] 2015    found in [address] Fibroid uterus 2018    ON MRI no sx.  Genital herpes     GERD (gastroesophageal reflux disease)     Hypercholesterolemia 2013    Infertility     Vitamin D deficiency 2015   ,   Past Surgical History:   Procedure Laterality Date    HYSTEROSCOPY Bilateral     hystosalpingram    HYSTEROSCOPY N/A 02/15/2019    cervical stenosis & check for fibroids    LUMBAR SPINE SURGERY  2018    [facility]KO    LUMBAR SPINE SURGERY  2019    SI joint injections with fluroscopy   ,   Family History   Problem Relation Age of Onset    Thyroid Disease Mother         CLT    High Cholesterol Mother     Anxiety Disorder Mother     Alcohol Abuse Father     Other Father         GB surg. + Cologuard w/ colon     High Blood Pressure Brother     Other Brother         GERD    Osteoporosis Maternal Grandmother     COPD Maternal Grandmother     Diabetes Maternal Grandmother     High Blood Pressure Maternal Grandmother     High Cholesterol Maternal Grandmother     Cancer Maternal Grandmother         precancer skin lesions.     Kidney Cancer Maternal Grandmother         nephrectomy    High Blood Pressure Maternal Grandfather     Clotting Disorder Maternal Grandfather     No Known Problems Sister     Heart Failure BP Readings from Last 3 Encounters:   03/24/20 112/70   12/30/19 110/70   11/25/19 112/68     Pulse Readings from Last 3 Encounters:   06/11/20 80   03/24/20 85   12/30/19 90     Wt Readings from Last 3 Encounters:   03/24/20 107 lb (48.5 kg)   12/30/19 111 lb 12.8 oz (50.7 kg)   11/25/19 109 lb 12.8 oz (49.8 kg)      6/11/2020 20:40   SARS-CoV-2 Not Detected     ASSESSMENT/PLAN:  440    Patient Instructions     Wash Daivd was seen today for anxiety and pain. Diagnoses and all orders for this visit:    Chronic bilateral low back pain without sciatica  -     traMADol (ULTRAM) 50 MG tablet; Take 1-2 tablets by mouth every 6 hours as needed for Pain for up to 30 days. Continue low back exercises. Range of motion and knee to chest do every 1-2 hours. Moist heat  Sports cream (Aspercreme doesn't smell). Tylenol 500 mg up to 2 tabs 3x/day as needed. Aleve 220 1-2 twice daily with food. If diagnosed with arthritis in the past:  Glucosamine 1500 mg/ chondroitin 1200 mg once daily or any combination equaling that dose i.e. 750/600 mg twice daily or 500/400 mg three time daily. This is a daily joint/arthritis supplement without significant side effects. Coccyx pain  -     traMADol (ULTRAM) 50 MG tablet; Take 1-2 tablets by mouth every 6 hours as needed for Pain for up to 30 days. Continue pelvic floor exercises. Chronic SI joint pain  -     traMADol (ULTRAM) 50 MG tablet; Take 1-2 tablets by mouth every 6 hours as needed for Pain for up to 30 days. Continue pelvic floor exercises. Continue all exercises given by physical therapy. Try exercises below 1 at a time to see which one results in a decrease in pain. There will likely be some improvement just like with walking. Anxiety  -     ALPRAZolam (XANAX) 0.5 MG tablet; TAKE 0.5-1 TABLET BY MOUTH AS NEEDED FOR SLEEP OR  ANXIETY EVERY 6 HRS FOR UP TO 2 PILLS/DAY FOR 30 DAYS  Continue to limit use.   This is not a muscle relaxer that is appropriate for muscle skeletal problems. Patient Education   Sacroiliac Pain: Exercises    Return in about 3 months (around 9/19/2020) for Low back pain, SI joint pain, coccyx pain, Anxiety. Becki Stewart is a 41 y.o. female being evaluated by a Virtual Visit (video visit) encounter to address concerns as mentioned above. A caregiver was present when appropriate. Due to this being a TeleHealth encounter (During JAIPB-30 public health emergency), evaluation of the following organ systems was limited: Vitals/Constitutional/EENT/Resp/CV/GI//MS/Neuro/Skin/Heme-Lymph-Imm. Pursuant to the emergency declaration under the [address] [place] [address] authority and the [employer] Act, this Virtual Visit was conducted with patient's (and/or legal guardian's) consent, to reduce the patient's risk of exposure to COVID-19 and provide necessary medical care. The patient (and/or legal guardian) has also been advised to contact this office for worsening conditions or problems, and seek emergency medical treatment and/or call 911 if deemed necessary. Patient identification was verified at the start of the visit: Yes    Total time spent on this encounter: 29 minutes of which more than half is spent in counseling. Services were provided through a video synchronous discussion virtually to substitute for in-person clinic visit. Patient and provider were located at their individual homes. --Elier Dixon, DO on 6/19/2020 at 4:11 PM    An electronic signature was used to authenticate this note.

## 2020-07-17 ENCOUNTER — TELEPHONE (OUTPATIENT)
Dept: ADMINISTRATIVE | Age: 40
End: 2020-07-17

## 2020-07-19 NOTE — PATIENT INSTRUCTIONS
Saurabh Correa was seen today for anxiety and pain. Diagnoses and all orders for this visit:    Chronic bilateral low back pain without sciatica  -     traMADol (ULTRAM) 50 MG tablet; Take 1-2 tablets by mouth every 6 hours as needed for Pain for up to 30 days. Continue low back exercises. Range of motion and knee to chest do every 1-2 hours. Moist heat  Sports cream (Aspercreme doesn't smell). Tylenol 500 mg up to 2 tabs 3x/day as needed. Aleve 220 1-2 twice daily with food. If diagnosed with arthritis in the past:  Glucosamine 1500 mg/ chondroitin 1200 mg once daily or any combination equaling that dose i.e. 750/600 mg twice daily or 500/400 mg three time daily. This is a daily joint/arthritis supplement without significant side effects. Coccyx pain  -     traMADol (ULTRAM) 50 MG tablet; Take 1-2 tablets by mouth every 6 hours as needed for Pain for up to 30 days. Continue pelvic floor exercises. Chronic SI joint pain  -     traMADol (ULTRAM) 50 MG tablet; Take 1-2 tablets by mouth every 6 hours as needed for Pain for up to 30 days. Continue pelvic floor exercises. Continue all exercises given by physical therapy. Try exercises below 1 at a time to see which one results in a decrease in pain. There will likely be some improvement just like with walking. Anxiety  -     ALPRAZolam (XANAX) 0.5 MG tablet; TAKE 0.5-1 TABLET BY MOUTH AS NEEDED FOR SLEEP OR  ANXIETY EVERY 6 HRS FOR UP TO 2 PILLS/DAY FOR 30 DAYS  Continue to limit use. This is not a muscle relaxer that is appropriate for muscle skeletal problems. Patient Education   Sacroiliac Pain: Exercises  Introduction  Here are some examples of exercises for you to try. The exercises may be suggested for a condition or for rehabilitation. Start each exercise slowly. Ease off the exercises if you start to have pain. You will be told when to start these exercises and which ones will work best for you.   How to do the exercises  Knee-to-chest stretch   1. Do not do the knee-to-chest exercise if it causes or increases back or leg pain. 2. Lie on your back with your knees bent and your feet flat on the floor. You can put a small pillow under your head and neck if it is more comfortable. 3. Grasp your hands under one knee and bring the knee to your chest, keeping the other foot flat on the floor. 4. Keep your lower back pressed to the floor. Hold for at least 15 to 30 seconds. 5. Relax and lower the knee to the starting position. Repeat with the other leg. 6. Repeat 2 to 4 times with each leg. 7. To get more stretch, keep your other leg flat on the floor while pulling your knee to your chest.    Bridging   1. Lie on your back with both knees bent. Your knees should be bent about 90 degrees. 2. Tighten your belly muscles by pulling in your belly button toward your spine. Then push your feet into the floor, squeeze your buttocks, and lift your hips off the floor until your shoulders, hips, and knees are all in a straight line. 3. Hold for about 6 seconds as you continue to breathe normally, and then slowly lower your hips back down to the floor and rest for up to 10 seconds. 4. Repeat 8 to 12 times. Hip extension   1. Get down on your hands and knees on the floor. 2. Keeping your back and neck straight, lift one leg straight out behind you. When you lift your leg, keep your hips level. Don't let your back twist, and don't let your hip drop toward the floor. 3. Hold for 6 seconds. Repeat 8 to 12 times with each leg. 4. If you feel steady and strong when you do this exercise, you can make it more difficult. To do this, when you lift your leg, also lift the opposite arm straight out in front of you. For example, lift the left leg and the right arm at the same time. (This is sometimes called the \"bird dog exercise. \") Hold for 6 seconds, and repeat 8 to 12 times on each side. Clamshell   1. Lie on your side with a pillow under your head.  Keep

## 2020-07-23 RX ORDER — LEVOTHYROXINE SODIUM 0.07 MG/1
TABLET ORAL
Qty: 90 TABLET | Refills: 0 | Status: SHIPPED | OUTPATIENT
Start: 2020-07-23 | End: 2020-10-28 | Stop reason: SDUPTHER

## 2020-07-26 NOTE — PROGRESS NOTES
Diagnoses and all orders for this visit:    Chronic lymphocytic thyroiditis  -     TSH without Reflex; Future  -     T4, FREE; Future  -     T3, FREE; Future    Vitamin D deficiency  -     VITAMIN D 25 HYDROXY; Future    Hypercholesterolemia  -     LIPID PANEL; Future  -     COMPREHENSIVE METABOLIC PANEL; Future    Chronic bilateral low back pain without sciatica  -     DRUG SCREEN MULTI URINE; Future  -     COMPREHENSIVE METABOLIC PANEL; Future    Chronic SI joint pain  -     DRUG SCREEN MULTI URINE; Future  -     COMPREHENSIVE METABOLIC PANEL; Future    Coccyx pain  -     DRUG SCREEN MULTI URINE; Future  -     COMPREHENSIVE METABOLIC PANEL; Future    Encounter for long-term (current) use of high-risk medication  -     DRUG SCREEN MULTI URINE; Future  -     COMPREHENSIVE METABOLIC PANEL; Future    Anxiety  -     DRUG SCREEN MULTI URINE;  Future

## 2020-08-28 ENCOUNTER — TELEPHONE (OUTPATIENT)
Dept: FAMILY MEDICINE CLINIC | Age: 40
End: 2020-08-28

## 2020-08-30 RX ORDER — ZOLPIDEM TARTRATE 10 MG/1
TABLET ORAL
Qty: 30 TABLET | Refills: 0 | Status: SHIPPED | OUTPATIENT
Start: 2020-09-06 | End: 2020-10-05

## 2020-09-11 ENCOUNTER — VIRTUAL VISIT (OUTPATIENT)
Dept: FAMILY MEDICINE CLINIC | Age: 40
End: 2020-09-11
Payer: COMMERCIAL

## 2020-09-11 PROCEDURE — 99214 OFFICE O/P EST MOD 30 MIN: CPT | Performed by: FAMILY MEDICINE

## 2020-09-11 RX ORDER — TRAMADOL HYDROCHLORIDE 50 MG/1
50-100 TABLET ORAL EVERY 6 HOURS PRN
Qty: 210 TABLET | Refills: 2 | OUTPATIENT
Start: 2020-09-11 | End: 2020-10-11

## 2020-09-11 RX ORDER — TRAMADOL HYDROCHLORIDE 50 MG/1
50-100 TABLET ORAL EVERY 6 HOURS PRN
Qty: 210 TABLET | Refills: 2 | Status: SHIPPED | OUTPATIENT
Start: 2020-09-11 | End: 2020-11-24 | Stop reason: SDUPTHER

## 2020-09-11 RX ORDER — TRAMADOL HYDROCHLORIDE 50 MG/1
TABLET ORAL
COMMUNITY
Start: 2020-08-16 | End: 2020-11-24

## 2020-09-11 RX ORDER — ALPRAZOLAM 0.5 MG/1
TABLET ORAL
Qty: 60 TABLET | Refills: 2 | Status: SHIPPED | OUTPATIENT
Start: 2020-09-11 | End: 2020-11-24 | Stop reason: SDUPTHER

## 2020-09-11 NOTE — PROGRESS NOTES
2020    TELEHEALTH EVALUATION -- Audio/Visual (During PAINH-80 public health emergency)  Chief Complaint   Patient presents with    Anxiety     ROUTINE FOLLOW UP FOR ANXIETY    Insomnia     ROUTINE FOLLOW UP FOR INSOMINA    Pain     ROUTINE FOLLOW UP FOR PAIN    501  HPI:    Amado Armijo (:  1980) has requested an audio/video evaluation for the following concern(s): Anxiety  She is taking 1/2 Xanax 4 times/day. For her anxiety she is doing just her usual her exercise. Does not take any other medicines. She is only working and doing her exercises. No stress management regimen. Chronic SI joint pain  Chronic bilateral low back pain without sciatica  Coccyx pain  The pain has plateaued. She feels like something is not healing. She thinks is not just the muscles. She had an x-ray done to to bone and she thought there was something going on in the S4 joint. She thought it was a chronic fracture. She is having spasms. When she first had the injury she started with her tailbone. Now she thinks it is in her sacrum. She does not do core body. Yoga has helped. Pelvic floor therapy helps. Doing pelvic floor exercises 4 times a week. No pain going down leg. Review of Systems    Prior to Visit Medications    Medication Sig Taking? Authorizing Provider   zolpidem (AMBIEN) 10 MG tablet TAKE 1 TABLET BY MOUTH EVERY DAY AT BEDTIME AS NEEDED FOR SLEEP Yes Amber Nguyen, DO   levothyroxine (SYNTHROID) 75 MCG tablet TAKE 1 TABLET BY MOUTH EVERY DAY Yes Stuart Shaikh, DO   ALPRAZolam (XANAX) 0.5 MG tablet TAKE 0.5-1 TABLET BY MOUTH AS NEEDED FOR SLEEP OR  ANXIETY EVERY 6 HRS FOR UP TO 2 PILLS/DAY FOR 30 DAYS Yes Stuart Shaikh, DO   fluticasone (FLONASE) 50 MCG/ACT nasal spray SHAKE LIQUID AND USE 1 SPRAY IN EACH NOSTRIL DAILY Yes Stuart Shaikh, DO   traMADol (ULTRAM) 50 MG tablet TAKE 1 2 TABLETS BY MOUTH EVERY 6 HOURS AS NEEDED FOR PAIN FOR UP TO 30 DAYS.   Historical Provider, MD       Social History Tobacco Use    Smoking status: Former Smoker     Years: 15.00     Types: Cigarettes     Last attempt to quit: 4/3/2012     Years since quittin.4    Smokeless tobacco: Never Used    Tobacco comment: Sometime no smoking; Educated in cessation. Substance Use Topics    Alcohol use: No     Comment: Quit drinking 2012. Was drinking 8-10 beers every other day for 2 months. Rarely drinks (holidays) then just a couple.  Drug use: No        No Known Allergies,   Past Medical History:   Diagnosis Date    Cervical stenosis (uterine cervix) 2014    Chronic lymphocytic thyroiditis     DDD (degenerative disc disease), lumbar 04/10/2018    ? L3-4     Depression     Drug overdose, intentional ([address] 2015    found in [address] Fibroid uterus 2018    ON MRI no sx.  Genital herpes     GERD (gastroesophageal reflux disease)     Hypercholesterolemia 2013    Infertility     Vitamin D deficiency 2015   ,   Past Surgical History:   Procedure Laterality Date    HYSTEROSCOPY Bilateral     hystosalpingram    HYSTEROSCOPY N/A 02/15/2019    cervical stenosis & check for fibroids    LUMBAR SPINE SURGERY  2018    MIKO    LUMBAR SPINE SURGERY  2019    SI joint injections with fluroscopy   ,   Family History   Problem Relation Age of Onset    Thyroid Disease Mother         CLT    High Cholesterol Mother     Anxiety Disorder Mother     Alcohol Abuse Father     Other Father         GB surg. + Cologuard w/ colon     High Blood Pressure Brother     Other Brother         GERD    Osteoporosis Maternal Grandmother     COPD Maternal Grandmother     Diabetes Maternal Grandmother     High Blood Pressure Maternal Grandmother     High Cholesterol Maternal Grandmother     Cancer Maternal Grandmother         precancer skin lesions.     Kidney Cancer Maternal Grandmother         nephrectomy    High Blood Pressure Maternal Grandfather     Clotting Disorder Maternal Grandfather     No Known Problems Sister     Heart Failure Paternal Grandmother     Thyroid Disease Paternal Grandmother     Stroke Paternal Grandmother     Dementia Paternal Grandmother     Osteoporosis Maternal Aunt     Anxiety Disorder Maternal Aunt     Breast Cancer Maternal Aunt 67        Rare and progressive    No Known Problems Brother     No Known Problems Brother        PHYSICAL EXAMINATION:  [ INSTRUCTIONS:  \"[x]\" Indicates a positive item  \"[]\" Indicates a negative item  -- DELETE ALL ITEMS NOT EXAMINED]  Vital Signs: (As obtained by patient/caregiver or practitioner observation)    Blood pressure-  Heart rate-    Respiratory rate-    Temperature-  Pulse oximetry-     Constitutional: [x] Appears well-developed and well-nourished [x] No apparent distress      [] Abnormal-   Mental status  [x] Alert and awake  [] Oriented to person/place/time [x]Able to follow commands      Eyes:  EOM    []  Normal  [] Abnormal-  Sclera  [x]  Normal  [] Abnormal -         Discharge [x]  None visible  [] Abnormal -    HENT:   [x] Normocephalic, atraumatic. [] Abnormal   [] Mouth/Throat: Mucous membranes are moist.     External Ears [] Normal  [] Abnormal-     Neck: [] No visualized mass     Pulmonary/Chest: [x] Respiratory effort normal.  [x] No visualized signs of difficulty breathing or respiratory distress        [] Abnormal-      Musculoskeletal:   [x] Normal gait with no signs of ataxia         [] Normal range of motion of neck        [x] Abnormal-  Has pain along the waist and at the L5 region. She feels a spasm at L5.        Neurological:        [x] No Facial Asymmetry (Cranial nerve 7 motor function) (limited exam to video visit)          [x] No gaze palsy        [] Abnormal-         Skin:        [x] No significant exanthematous lesions or discoloration noted on facial skin         [] Abnormal-            Psychiatric:       [x] Normal Affect [] No Hallucinations        [] Abnormal-     Other pertinent (During VWFWO-29 public health emergency), evaluation of the following organ systems was limited: Vitals/Constitutional/EENT/Resp/CV/GI//MS/Neuro/Skin/Heme-Lymph-Imm. Pursuant to the emergency declaration under the [facility], [address] [place] [address] and the [employer] Act, this Virtual Visit was conducted with patient's (and/or legal guardian's) consent, to reduce the patient's risk of exposure to COVID-19 and provide necessary medical care. The patient (and/or legal guardian) has also been advised to contact this office for worsening conditions or problems, and seek emergency medical treatment and/or call 911 if deemed necessary. Patient identification was verified at the start of the visit: Yes    Total time spent on this encounter: More than 25 minutes of which more than half was spent in counseling. Services were provided through a video synchronous discussion virtually to substitute for in-person clinic visit. Patient and provider were located at their individual homes. --Nicholas Rivas DO on 9/11/2020 at 5:02 PM    An electronic signature was used to authenticate this note.

## 2020-10-05 RX ORDER — ZOLPIDEM TARTRATE 10 MG/1
TABLET ORAL
Qty: 30 TABLET | Refills: 1 | Status: SHIPPED | OUTPATIENT
Start: 2020-10-06 | End: 2020-11-24 | Stop reason: SDUPTHER

## 2020-10-05 RX ORDER — ZOLPIDEM TARTRATE 10 MG/1
TABLET ORAL
Qty: 30 TABLET | Refills: 4 | OUTPATIENT
Start: 2020-10-05 | End: 2020-11-03

## 2020-10-05 NOTE — TELEPHONE ENCOUNTER
Patient is leaving for a 2 week vacation on 10/9/20 Alprazolam is due on the 15th. Can we ok this to be filled early so she does not run out.

## 2020-10-06 NOTE — TELEPHONE ENCOUNTER
Psychophysiological insomnia  -     zolpidem (AMBIEN) 10 MG tablet; TAKE 1 TABLET BY MOUTH AT BEDTIME AS NEEDED FOR SLEEP  PDMP reviewed.

## 2020-10-07 NOTE — TELEPHONE ENCOUNTER
PATIENT CALLING AGAIN TO MAKE SURE THIS PRESCRIPTION WILL BE CALLED IN BY THIS Friday -  AT THE LATEST     PLEASE CALL PT @  [phone]

## 2020-10-28 RX ORDER — LEVOTHYROXINE SODIUM 0.07 MG/1
TABLET ORAL
Qty: 30 TABLET | Refills: 0 | Status: SHIPPED | OUTPATIENT
Start: 2020-10-28 | End: 2020-11-24 | Stop reason: SDUPTHER

## 2020-11-02 NOTE — PATIENT INSTRUCTIONS
Bambi Mckinney was seen today for anxiety, insomnia and pain. Diagnoses and all orders for this visit:    Anxiety  -     ALPRAZolam (XANAX) 0.5 MG tablet; TAKE 0.5-1 TABLET BY MOUTH AS NEEDED FOR SLEEP OR  ANXIETY EVERY 6 HRS FOR UP TO 2 PILLS/DAY FOR 30 DAYS  Work on stress management see below. Consider half doses and every time you take the medication. Chronic SI joint pain  -     traMADol (ULTRAM) 50 MG tablet; Take 1-2 tablets by mouth every 6 hours as needed for Pain for up to 90 days. Chronic bilateral low back pain without sciatica  -     traMADol (ULTRAM) 50 MG tablet; Take 1-2 tablets by mouth every 6 hours as needed for Pain for up to 90 days. Coccyx pain  -     traMADol (ULTRAM) 50 MG tablet; Take 1-2 tablets by mouth every 6 hours as needed for Pain for up to 90 days. Continue to use tramadol 50 mg as little as possible. It does not make things better. Continue your current exercise plan. We will refer you to [facility] or [facility] where ever you would like to go at your convenience. Patient Education   Work-Life Balance: Care Instructions  Your Care Instructions    Do you ever feel like there is not enough time to do all of the things you have to do, and no time at all for the things you enjoy? If so, you are not alone. On average, people in the [place] have worked more and more hours each year since 1970. But in recent years, fewer people say they want to take on more at work, even if they would get promoted or get paid more money. More and more workers say they want time to spend with their families and to do things that are important to them. Do you ever feel:  · That you always have more and more work to do at your job? · That too many people depend on you every day? · That you never have enough time for your family or friends? · That you never have time for hobbies or things you enjoy? · That each second of your day is scheduled?   If you answered \"yes\" to any of these questions, take steps at work and at home to get your life into balance. Follow-up care is a key part of your treatment and safety. Be sure to make and go to all appointments, and call your doctor if you are having problems. How can you care for yourself at home? Manage your time  · Focus on the important things. Taking on too much can wear you out. Look at how you spend your time, and redirect your focus. Learn to say \"no\" and let go of things that do not matter. · Set one small goal at a time. Use a day planner. Break large projects into smaller ones. · Ask for help. Let your children, your spouse, your coworkers, and other people in your life help you get things done. · Leave your job at the office. If you give up free time to get more work done, you may pay for it with stress. If your job offers a flexible work schedule, use it to fit your own work style. For instance, come in earlier to have a longer lunch break, or make time for a yoga class or workout during your workday. · Unplug. Do not let technology (such as your cell phone or the Internet) erase the line between your time and your employer's time. Lower job stress  Job stress causes trouble at work and at home. At work, you may worry about things you have not had time to do at home. At home, you may worry about your job. This cycle upsets your work-life balance. Lowering your job stress can get your life back in balance. Job stress can be caused by:  · Pressure and deadlines. · Heavy workloads or long hours. · Not being allowed to make decisions. · Health and safety hazards. · Feeling you may lose your job. · Unclear or changing job duties. · Too much responsibility. · Work that is very tiring or boring. Do any of these things bother you? Consider talking with your boss to change things. There are some things that you may not be able to control. But even a few small changes might help lower your stress.   Take advantage of programs at someone else. A lot of things can cause stress. You may feel stress when you go on a job interview, take a test, or run a race. This kind of short-term stress is normal and even useful. It can help you if you need to work hard or react quickly. For example, stress can help you finish an important job on time. Stress also can last a long time. Long-term stress is caused by stressful situations or events. Examples of long-term stress include long-term health problems, ongoing problems at work, or conflicts in your family. Long-term stress can harm your health. How does stress affect your health? When you are stressed, your body responds as though you are in danger. It makes hormones that speed up your heart, make you breathe faster, and give you a burst of energy. This is called the fight-or-flight stress response. If the stress is over quickly, your body goes back to normal and no harm is done. But if stress happens too often or lasts too long, it can have bad effects. Long-term stress can make you more likely to get sick, and it can make symptoms of some diseases worse. If you tense up when you are stressed, you may develop neck, shoulder, or low back pain. Stress is linked to high blood pressure and heart disease. Stress also harms your emotional health. It can make you medina, tense, or depressed. Your relationships may suffer, and you may not do well at work or school. What can you do to manage stress? How to relax your mind   · Write. It may help to write about things that are bothering you. This helps you find out how much stress you feel and what is causing it. When you know this, you can find better ways to cope. · Let your feelings out. Talk, laugh, cry, and express anger when you need to. Talking with friends, family, a counselor, or a member of the clergy about your feelings is a healthy way to relieve stress. · Do something you enjoy. For example, listen to music or go to a movie.  Practice your hobby or do volunteer work. · Meditate. This can help you relax, because you are not worrying about what happened before or what may happen in the future. · Do guided imagery. Imagine yourself in any setting that helps you feel calm. You can use audiotapes, books, or a teacher to guide you. How to relax your body   · Do something active. Exercise or activity can help reduce stress. Walking is a great way to get started. Even everyday activities such as housecleaning or yard work can help. · Do breathing exercises. For example:  ? From a standing position, bend forward from the waist with your knees slightly bent. Let your arms dangle close to the floor. ? Breathe in slowly and deeply as you return to a standing position. Roll up slowly and lift your head last.  ? Hold your breath for just a few seconds in the standing position. ? Breathe out slowly and bend forward from the waist.  · Try yoga or jesica chi. These techniques combine exercise and meditation. You may need some training at first to learn them. What can you do to prevent stress? · Manage your time. This helps you find time to do the things you want and need to do. · Get enough sleep. Your body recovers from the stresses of the day while you are sleeping. · Get support. Your family, friends, and community can make a difference in how you experience stress. Where can you learn more? Go to https://Letsgofordinnermariam.healtheCommHub. org and sign in to your Murfie account. Enter H250 in the Numblebee box to learn more about \"Learning About Stress. \"     If you do not have an account, please click on the \"Sign Up Now\" link. Current as of: December 16, 2019               Content Version: 12.6  © 0212-6491 [employer]. Care instructions adapted under license by [facility] ([facility]).  If you have questions about a medical condition or this instruction, always ask your healthcare professional. [place] any warranty or liability for your use of this information. Patient Education   Learning About Guided Imagery for Stress  What are guided imagery and stress? Stress is what you feel when you have to handle more than you are used to. A lot of things can cause stress. You may feel stress when you go on a job interview, take a test, or run a race. This kind of short-term stress is normal and even useful. It can help you if you need to work hard or react quickly. Stress also can last a long time. Long-term stress is caused by stressful situations or events. Examples of long-term stress include long-term health problems, ongoing problems at work, and conflicts in your family. Long-term stress can harm your health. Guided imagery is a technique of directed thoughts and suggestions that guide your mind toward a relaxed, focused state. This technique helps you use your mind to take you to a calm, peaceful place. You can use it to relax, which can lower blood pressure and reduce other problems related to stress. How does guided imagery help to relieve stress? Because of the way the mind and body are connected, guided imagery can make you feel like you are experiencing something just by imagining it. You can achieve a relaxed state when you imagine all the details of a safe, comfortable place, such as a beach or a garden. This relaxed state may help you feel more in control of your emotions and thought processes. Feeling in control may improve your attitude, health, and sense of well-being. How do you do guided imagery? You can use a smartphone milton or a video to lead you through the process. You use all of your senses in guided imagery. For example, if you want a tropical setting, you can imagine the warm breeze on your skin, the bright blue of the water, the sound of the surf, the sweet scent of tropical flowers, and the taste of coconut. Imagining those things can make you actually feel like you're there.   But you don't have to imagine the tropics to feel peace. Guided imagery can take you to your own restful place. To give guided imagery a try, follow these steps:  · Lean back comfortably in your chair. If you can, close your eyes. Put your arms on the armrests, or fold your hands in your lap. · Take a deep breath through your nose. Breathe in slowly, and then let the air out completely through your mouth. · Do it again slowly. Keep breathing like this. Gather up any tension in your body, and send it out with every breath. · Let a feeling of warmth spread from your lungs to your neck and head, down your arms to your fingertips, through your body and into your legs, all the way down to your toes. Stay this way for a moment. · Now imagine a pleasant day. You're at a park or at a place you've visited in the past where you felt at peace. · In your mind's eye, look at what lies in front of you. Maybe you see the sun, filtered through trees. Maybe clouds are drifting by. · Look to one side, and then the other. Notice the feel of the air around you. Notice how it feels on your face and on your arms. · Stay here for a while. Let it become real for you. Follow-up care is a key part of your treatment and safety. Be sure to make and go to all appointments, and call your doctor if you are having problems. It's also a good idea to know your test results and keep a list of the medicines you take. Where can you learn more? Go to https://Livescribemariam."[employer]. org and sign in to your Glider account. Enter N291 in the [facility] box to learn more about \"Learning About Guided Imagery for Stress. \"     If you do not have an account, please click on the \"Sign Up Now\" link. Current as of: December 16, 2019               Content Version: 12.6  © 2180-3026 [employer]. Care instructions adapted under license by [facility] ([facility]).  If you have questions about a medical condition or this instruction, always ask your healthcare professional. [place] [address] any warranty or liability for your use of this information. Patient Education   Learning About Progressive Muscle Relaxation for Stress  What are progressive muscle relaxation and stress? Stress is what you feel when you have to handle more than you are used to. A lot of things can cause stress. You may feel stress when you go on a job interview, take a test, or run a race. This kind of short-term stress is normal and even useful. It can help you if you need to work hard or react quickly. Stress also can last a long time. Long-term stress is caused by stressful situations or events. Examples of long-term stress include long-term health problems, ongoing problems at work, and conflicts in your family. Long-term stress can harm your health. When you have anxiety or stress in your life, one of the ways your body responds is with muscle tension. Progressive muscle relaxation is a method that helps relieve that tension. How does progressive muscle relaxation reduce stress? The body responds to stress with muscle tension, which can cause pain or discomfort. In turn, tense muscles relay to the body that it's stressed. That keeps the cycle of stress and muscle tension going. Progressive muscle relaxation helps break this cycle by reducing muscle tension and general mental anxiety. This method often helps people get to sleep. How do you do progressive muscle relaxation? To do progressive muscle relaxation, first you tense a group of muscles as you breathe in. Then you relax them as you breathe out. Notice how your muscles feel when you relax them. You work on your muscle groups in a certain order. Through practice, you can learn to feel the difference between a tensed muscle and a relaxed muscle. Then you can learn how to turn on this relaxed state at the first sign of a muscle tensing up due to stress.   Practicing this method for a few weeks will help you get better at this skill. In time you'll be able to use this method to relieve stress. When you first start, it may help to use an audio recording until you learn all the muscle groups in order. Check online for these recordings. Choose a place where you won't be interrupted. It should have space for you to lie down on your back and stretch out comfortably, such as on a carpeted floor. Follow-up care is a key part of your treatment and safety. Be sure to make and go to all appointments, and call your doctor if you are having problems. It's also a good idea to know your test results and keep a list of the medicines you take. Where can you learn more? Go to https://[employer]. org and sign in to your St[employer] account. Enter Y367 in the [employer] box to learn more about \"Learning About Progressive Muscle Relaxation for Stress. \"     If you do not have an account, please click on the \"Sign Up Now\" link. Current as of: December 16, 2019               Content Version: 12.6  © 7740-3004 [employer]. Care instructions adapted under license by [facility] ([facility]). If you have questions about a medical condition or this instruction, always ask your healthcare professional. [address] any warranty or liability for your use of this information.

## 2020-11-24 ENCOUNTER — OFFICE VISIT (OUTPATIENT)
Dept: FAMILY MEDICINE CLINIC | Age: 40
End: 2020-11-24
Payer: COMMERCIAL

## 2020-11-24 VITALS
TEMPERATURE: 98.6 F | RESPIRATION RATE: 20 BRPM | WEIGHT: 113 LBS | BODY MASS INDEX: 21.34 KG/M2 | HEIGHT: 61 IN | SYSTOLIC BLOOD PRESSURE: 118 MMHG | OXYGEN SATURATION: 97 % | HEART RATE: 70 BPM | DIASTOLIC BLOOD PRESSURE: 78 MMHG

## 2020-11-24 LAB
ALCOHOL URINE: NEGATIVE
AMPHETAMINE SCREEN, URINE: NEGATIVE
BARBITURATE SCREEN, URINE: NEGATIVE
BENZODIAZEPINE SCREEN, URINE: POSITIVE
BUPRENORPHINE URINE: NEGATIVE
COCAINE METABOLITE SCREEN URINE: NEGATIVE
FENTANYL SCREEN, URINE: NEGATIVE
FOLLICLE STIMULATING HORMONE: 9.9 MIU/ML
GABAPENTIN SCREEN, URINE: NEGATIVE
MDMA URINE: NEGATIVE
METHADONE SCREEN, URINE: NEGATIVE
METHAMPHETAMINE, URINE: NEGATIVE
OPIATE SCREEN URINE: NEGATIVE
OXYCODONE SCREEN URINE: NEGATIVE
PHENCYCLIDINE SCREEN URINE: NEGATIVE
PROPOXYPHENE SCREEN, URINE: NEGATIVE
SYNTHETIC CANNABINOIDS(K2) SCREEN, URINE: NEGATIVE
THC SCREEN, URINE: NEGATIVE
TRAMADOL SCREEN URINE: NEGATIVE
TRICYCLIC ANTIDEPRESSANTS, UR: NEGATIVE
TSH REFLEX: 1.03 UIU/ML (ref 0.27–4.2)

## 2020-11-24 PROCEDURE — 99215 OFFICE O/P EST HI 40 MIN: CPT | Performed by: FAMILY MEDICINE

## 2020-11-24 PROCEDURE — 80305 DRUG TEST PRSMV DIR OPT OBS: CPT | Performed by: FAMILY MEDICINE

## 2020-11-24 RX ORDER — ZOLPIDEM TARTRATE 10 MG/1
TABLET ORAL
Qty: 30 TABLET | Refills: 5 | Status: SHIPPED | OUTPATIENT
Start: 2020-11-24 | End: 2021-01-23

## 2020-11-24 RX ORDER — ALPRAZOLAM 0.5 MG/1
TABLET ORAL
Qty: 60 TABLET | Refills: 2 | Status: SHIPPED | OUTPATIENT
Start: 2020-12-07 | End: 2021-02-26 | Stop reason: SDUPTHER

## 2020-11-24 RX ORDER — LEVOTHYROXINE SODIUM 0.07 MG/1
TABLET ORAL
Qty: 30 TABLET | Refills: 2 | Status: SHIPPED | OUTPATIENT
Start: 2020-11-24 | End: 2021-02-19 | Stop reason: SDUPTHER

## 2020-11-24 RX ORDER — TRAMADOL HYDROCHLORIDE 50 MG/1
50-100 TABLET ORAL EVERY 6 HOURS PRN
Qty: 210 TABLET | Refills: 2 | Status: SHIPPED | OUTPATIENT
Start: 2020-12-07 | End: 2021-02-26 | Stop reason: SDUPTHER

## 2020-11-24 NOTE — PATIENT INSTRUCTIONS
Kali Vanessa was seen today for anxiety and lower back pain. Diagnoses and all orders for this visit:    Chronic lymphocytic thyroiditis    Psychophysiological insomnia    Anxiety    Chronic SI joint pain    Chronic bilateral low back pain without sciatica    Coccyx pain    Encounter for long-term (current) use of high-risk medication    Amenorrhea    COVID-19  The risk is likely to increase for COVID-19 infection as the reopening occurs. This is because while some people are being careful with social distancing masks and social isolation other people are totally ignoring it. Preventing the Spread of Coronavirus Disease 2019 in Homes and Residential Communities   For the most recent information go to [email or web address]    Prevention steps for People with confirmed or suspected COVID-19 (including persons under investigation) who do not need to be hospitalized  and   People with confirmed COVID-19 who were hospitalized and determined to be medically stable to go home    Your healthcare provider and public health staff will evaluate whether you can be cared for at home. If it is determined that you do not need to be hospitalized and can be isolated at home, you will be monitored by staff from your local or state health department. You should follow the prevention steps below until a healthcare provider or local or state health department says you can return to your normal activities. Stay home except to get medical care  People who are mildly ill with COVID-19 are able to isolate at home during their illness. You should restrict activities outside your home, except for getting medical care. Do not go to work, school, or public areas. Avoid using public transportation, ride-sharing, or taxis. Separate yourself from other people and animals in your home  People: As much as possible, you should stay in a specific room and away from other people in your home. Also, you should use a separate bathroom, if available. Animals: You should restrict contact with pets and other animals while you are sick with COVID-19, just like you would around other people. Although there have not been reports of pets or other animals becoming sick with COVID-19, it is still recommended that people sick with COVID-19 limit contact with animals until more information is known about the virus. When possible, have another member of your household care for your animals while you are sick. If you are sick with COVID-19, avoid contact with your pet, including petting, snuggling, being kissed or licked, and sharing food. If you must care for your pet or be around animals while you are sick, wash your hands before and after you interact with pets and wear a facemask. Call ahead before visiting your doctor  If you have a medical appointment, call the healthcare provider and tell them that you have or may have COVID-19. This will help the healthcare providers office take steps to keep other people from getting infected or exposed. Wear a facemask  You should wear a facemask when you are around other people (e.g., sharing a room or vehicle) or pets and before you enter a healthcare providers office. If you are not able to wear a facemask (for example, because it causes trouble breathing), then people who live with you should not stay in the same room with you, or they should wear a facemask if they enter your room. Cover your coughs and sneezes  Cover your mouth and nose with a tissue when you cough or sneeze. Throw used tissues in a lined trash can. Immediately wash your hands with soap and water for at least 20 seconds or, if soap and water are not available, clean your hands with an alcohol-based hand  that contains at least 60% alcohol.   Clean your hands often  Wash your hands often with soap and water for at least 20 seconds, especially after blowing your nose, coughing, or sneezing; going to the bathroom; and before eating or preparing food. If soap and water are not readily available, use an alcohol-based hand  with at least 60% alcohol, covering all surfaces of your hands and rubbing them together until they feel dry. Soap and water are the best option if hands are visibly dirty. Avoid touching your eyes, nose, and mouth with unwashed hands. Avoid sharing personal household items  You should not share dishes, drinking glasses, cups, eating utensils, towels, or bedding with other people or pets in your home. After using these items, they should be washed thoroughly with soap and water. Clean all high-touch surfaces everyday  High touch surfaces include counters, tabletops, doorknobs, bathroom fixtures, toilets, phones, keyboards, tablets, and bedside tables. Also, clean any surfaces that may have blood, stool, or body fluids on them. Use a household cleaning spray or wipe, according to the label instructions. Labels contain instructions for safe and effective use of the cleaning product including precautions you should take when applying the product, such as wearing gloves and making sure you have good ventilation during use of the product. Monitor your symptoms  Seek prompt medical attention if your illness is worsening (e.g., difficulty breathing). Before seeking care, call your healthcare provider and tell them that you have, or are being evaluated for, COVID-19. Put on a facemask before you enter the facility. These steps will help the healthcare providers office to keep other people in the office or waiting room from getting infected or exposed. Ask your healthcare provider to call the local or state health department. Persons who are placed under active monitoring or facilitated self-monitoring should follow instructions provided by their local health department or occupational health professionals, as appropriate.  When working with your local health department check their available hours. If you have a medical emergency and need to call 911, notify the dispatch personnel that you have, or are being evaluated for COVID-19. If possible, put on a facemask before emergency medical services arrive. Discontinuing home isolation  Patients with confirmed COVID-19 should remain under home isolation precautions until the risk of secondary transmission to others is thought to be low. The decision to discontinue home isolation precautions should be made on a case-by-case basis, in consultation with healthcare providers and state and local health departments. Use Tylenol NOT Ibuprofen/Aleve/aspirin for pain or fevers. There is a theoretical decrease in the body's ability to fight the virus when you are infected if you are on NSAIDs. The FDA has said that this information is not yet proven. The newest evidence suggest that wearing a mask in public may be beneficial if you remember that the outside of it is contaminated and treat it accordingly. It also helps prevent spread if someone has an asymptomatic or presymptomatic case and does not know it yet. Even cloth masks can be beneficial.    Advance Care Planning  People with COVID-19 may have no symptoms, mild symptoms, such as fever, cough, and shortness of breath or they may have more severe illness, developing severe and fatal pneumonia. As a result, Advance Care Planning with attention to naming a health care decision maker (someone you trust to make healthcare decisions for you if you could not speak for yourself) and sharing other health care preferences is important BEFORE a possible health crisis. Please contact your Primary Care Provider to discuss Advance Care Planning. Vitamin D by mouth and vitamin C intravenously are being used as part of the treatment regimen for COVID-19 in some hospitalized patients. XXX vitamin D XX IU . Also start vitamin C 500 mg XXX daily.   Both of these should be continued for the duration of the moderate risk portion of the COVID-19 pandemic expected to be 2 years according to the [employer]. Continue wearing a mask when around people except those living in the same house who are not infected with or heavily exposed to COVID-19, handwashing/hand gel use, social distancing, and social isolation for nonessential activities. IF you develop ANY respiratory infection symptoms (not just allergy symptoms) suggestive of COVID-19 start the recommendations below: Instructions for Respiratory Infections (SAVE THIS SHEET)    For the first 7-14 days of symptoms follow instructions below, even before being seen in the office or even during treatment with antibiotics, until symptom free. 1. Water: Drink 1 ounce of water for every 2 pounds of body weight for adults, you need 50 Ounces of water/fluids per day. This will loosen mucus in the head and chest & improve the weak feeling of dehydration, allow the body to get germ fighting resources to the infection. Half of fluids can be juice or sugar free Crystal Light. Don't count drinks with caffeine, alcohol or carbonation. Infants can have Pedialyte liquid or freezer pops. Avoid salt and sports drinks if you have high Blood Pressure, swelling in the feet or ankles or have heart problems. 2. Humidity: Humidify the air to 35-50% ( or until the windows fog over slightly). Can use a humidifier, vaporizer, boil water on the stove or put a coffee can full of water on the heater vents. This will loosen mucus from infections and allergies. 3. Sleep: Get 8-10 hours a night and rest during the evening after work or school. If you have trouble sleeping, adults can take Melatonin 5mg up to 2 tabs at bedtime ( not for children or pregnant women). If Mono is suspected then sleep during 9PM to 9AM time span (if possible.)  4. Cough:  Take cough medicines with Guaifenesin ( to loosen chest or head congestion) and Dextromethorphan ( to decrease excess cough). Robitussin D.M. Syrup every 4-6 hrs OR Mucinex D. M. pills OR Delsym DM syrup twice a day. Use the pediatric formulations for children over 6 months making sure they are alcohol & sugar free for children, pregnant women, and diabetics. 5. Pain And Fevers: Take Acetaminophen ( Tylenol) for fevers, aches, and headaches. 2-500 mg every 8 hours for adults. Appropriate doses at bedtime for children may help them sleep better. If pregnant take 1 -500 mg (Tylenol) every 8 hours as needed. Ibuprofen/Aleve/aspirin for pain and fevers SHOULD NOT BE USED IN THE SETTING OF POSSIBLE COVID-19 viral infection NOR if pregnant, if you have acid reflux, high blood pressure, CHF, or kidney problems. 6.Gargle: (DAY ONE OF SYMPTOMS) Gargle in the back of the throat with the head tilted back and to the sides with a strong mouthwash  ( Listerine or Scope) after meals and at bedtime at least 4 -5 times a day. This helps kill bacteria and viruses in the back of the throat and will shorten the duration and decrease the severity of your symptoms: sore throat, cough, ear popping,/ear pain, and possibly dizziness. 7. Smoking: Avoid smoking or exposure to second hand smoke. 8. Zinc: (DAY ONE OF SYMPTOMS)  Zinc lozenges such as Cold Melchor (available most stores), or Basic ([employer] brand) will help shorten the duration and lessen symptoms such as sore throat, cough, nasal congestion, runny nose, and post nasal drip. Use 1 lozenge every 2-4 hours ( after meals if stomach is sensitive). Children can use 10-15 mg or less 3-4 times a day or Zinc lollypops. In pregnancy limit to 50-60 mg a day for 7 days as prenatals have Zinc also. With diarrhea use zinc pills 50 mg 1/2 to 1 pill 2x/day. 9. Vitamins: Vitamin C 500 mg with breakfast and dinner (with suspected or diagnosed COVID-19 infection take vitamin C 1000 mg 2-3 times a day). Children and pregnant women should drink citrus juices.  This speeds healing and strengthens immune system. 10. Chest Symptoms: Vicks Vapor rub to the chest at bedtime. 11. Decongestants: Avoid all decongestants and antihistamine cold preparations in children. Decongestants should always be avoided in people with high blood pressure, palpitations, heart disease and those on stimulant medications. Antihistamines may impede the body's ability to fight COVID-19.  12. Frequent hand washing and/or hand gel especially after coughing or sneezing into the hands or blowing the nose to help prevent spreading to others. Use kleenex and NOT handkerchiefs. Try all of the above starting with day 1 of symptoms. If Strep throat symptoms appear call to be seen in the office as soon as possible and don't gargle on that day. Newborns, infants, or anyone with earaches or influenza may need to be seen quickly. Adults with fevers over 103 degrees or shortness of breath should call the office immediately.     Nutrients that support the immune system:  Beta carotene/vitamin A  Vitamin C  Vitamin D  Zinc  Proteins  Probiotics/prebiotic's(prebiotics are fiber sources that support the growth of probiotics)  Water from all sources including foods such as fruit: Women 2.7 L / 91 ounces per day AND men 3.7 L/125 ounces per day  Source Kevin FREEDMAN (award winning nutritionist/registered dietitian, author, ) 3/25/2020

## 2020-11-24 NOTE — PROGRESS NOTES
Subjective:      Patient ID: Mansi Garnica is a 41 y.o. female. Chief Complaint   Patient presents with    Anxiety     PT HERE FOR ROUTINE FOLLOW UP ON ANXIETY AND NEEDS REFILLS ON MEDICATION. LAST DOSE OF Celester Imus 11/24/20. [address] AND UDS DUE TODAY    Lower Back Pain     PT HERE FOR ROUTINE FOLLOW UP ON LOW BACK PAIN AND NEEDS REFILLS ON MEDICATION. LAST DOSE OF TRAMADOL WAS 11/23/2020. [facility] AND UDS NEEDED TODAY   848  HPI    Chronic lymphocytic thyroiditis  Current symptoms include feeling cold and cold intolerance, anxiousness, tremulousness, palpitations, change in skin,  nails, or hair, depression, goiter, ocular symptoms. Menses is shorter and less intense. Patient denies fatigue, weight gain, constipation, swelling, feeling excessive energy, sweating, weight loss, diarrhea, heat intolerance. Psychophysiological insomnia  Is sleeping well most nights. Takes ambien 10 mg nightly routinely. Exercises 4x/wk. Anxiety  She takes it for the anxiety and for spasms. Used to be able to talk herself out of panic attacks or splash water on her self. Is only taking 1/2 Xanax 1/2 q 4-6 hrs. Chronic SI joint pain/ Chronic bilateral low back pain without sciatica/ Coccyx pain  She is doing Yoga and stretches. Uses heating pad. Takes Advil 2 3x/day or less sometimes with a meal.  Plans a specialist at [address] [place] [address]. If no answers at the [facility]. Review of Systems    Past Medical History:   Diagnosis Date    Cervical stenosis (uterine cervix) 1/1/2014    Chronic lymphocytic thyroiditis     DDD (degenerative disc disease), lumbar 04/10/2018    ? L3-4     Depression     Drug overdose, intentional ([address] 11/11/2015    found in [address] Fibroid uterus 11/01/2018    ON MRI no sx.     Genital herpes     GERD (gastroesophageal reflux disease)     Hypercholesterolemia 5/21/2013    Infertility     Vitamin D deficiency 1/7/2015       Past Surgical History:   Procedure Laterality Date    HYSTEROSCOPY Bilateral 2013    hystosalpingram    HYSTEROSCOPY N/A 02/15/2019    cervical stenosis & check for fibroids    LUMBAR SPINE SURGERY  2018    MIKO    LUMBAR SPINE SURGERY  2019    SI joint injections with fluroscopy       Social History     Socioeconomic History    Marital status:      Spouse name: Lamar Shields Number of children: 0    Years of education: 12    Highest education level: Not on file   Occupational History    Occupation: Sales  - sales     Comment: [id number] Occupation: Unemployed     Comment: 2019    Occupation: [employer]     Comment: trying to go back (winter)    Occupation: [employer] sales     Comment: Summer (2nd job)    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 Substance and Sexual Activity    Alcohol use: No     Comment: Quit drinking 2012. Was drinking 8-10 beers every other day for 2 months. Rarely drinks (holidays) then just a couple.      Drug use: No    Sexual activity: Yes     Partners: Male   Lifestyle    Physical activity     Days per week: Not on file     Minutes per session: Not on file    Stress: Not on file   Relationships    Social connections     Talks on phone: Not on file     Gets together: Not on file     Attends Hindu service: Not on file     Active member of club or organization: Not on file     Attends meetings of clubs or organizations: Not on file     Relationship status: Not on file    Intimate partner violence Fear of current or ex partner: Not on file     Emotionally abused: Not on file     Physically abused: Not on file     Forced sexual activity: Not on file   Other Topics Concern    Not on file   Social History Narrative    No exercise. 5/31/17. 8/25/17. 11/17/17. Stretches only. 2/16/18. Daily activity. 7/31/18. Not working. Can't sit or stand for a long time. 11/9/18 Step dad had aneurism in brain. No exercise. 2/12/19. Had to stop Stretches. 4/22/19. In PT for pelvic floor. 7/25/19. New provider. 10/7/19. Walks on treadmill 4x/wk 20-25 min. 3/24/20. Now 30 min 4x/wk 1-2 mph. 6/19/20.9/11/20. Yoga and stretches. 11/24/20. Family History   Problem Relation Age of Onset    Thyroid Disease Mother         CLT    High Cholesterol Mother     Anxiety Disorder Mother     Alcohol Abuse Father     Other Father         GB surg. + Cologuard w/ colon     High Blood Pressure Brother     Other Brother         GERD    Osteoporosis Maternal Grandmother     COPD Maternal Grandmother     Diabetes Maternal Grandmother     High Blood Pressure Maternal Grandmother     High Cholesterol Maternal Grandmother     Cancer Maternal Grandmother         precancer skin lesions.     Kidney Cancer Maternal Grandmother         nephrectomy    High Blood Pressure Maternal Grandfather     Clotting Disorder Maternal Grandfather     No Known Problems Sister     Heart Failure Paternal Grandmother     Thyroid Disease Paternal Grandmother     Stroke Paternal Grandmother     Dementia Paternal Grandmother     Osteoporosis Maternal Aunt     Anxiety Disorder Maternal Aunt     Breast Cancer Maternal Aunt 79        Rare and progressive    No Known Problems Brother     No Known Problems Brother        No Known Allergies    Current Outpatient Medications   Medication Sig Dispense Refill    levothyroxine (SYNTHROID) 75 MCG tablet TAKE 1 TABLET BY MOUTH EVERY DAY 30 tablet 2    zolpidem (AMBIEN) 10 MG tablet TAKE 1 TABLET BY MOUTH AT Nails: There is no clubbing. Neurological:      Mental Status: She is alert. Deep Tendon Reflexes:      Reflex Scores:       Patellar reflexes are 2+ on the right side and 2+ on the left side. Psychiatric:         Speech: Speech normal.         Behavior: Behavior normal.         Judgment: Judgment normal.       Vitals:    11/24/20 0827   BP: 118/78   Site: Right Upper Arm   Position: Sitting   Cuff Size: Small Adult   Pulse: 70   Resp: 20   Temp: 98.6 °F (37 °C)   TempSrc: Infrared   SpO2: 97%   Weight: 113 lb (51.3 kg)   Height: 5' 1\" (1.549 m)     BP Readings from Last 3 Encounters:   11/24/20 118/78   03/24/20 112/70   12/30/19 110/70     Pulse Readings from Last 3 Encounters:   11/24/20 70   06/11/20 80   03/24/20 85     Wt Readings from Last 3 Encounters:   11/24/20 113 lb (51.3 kg)   03/24/20 107 lb (48.5 kg)   12/30/19 111 lb 12.8 oz (50.7 kg)     Body mass index is 21.35 kg/m². Ref.  Range 1/7/2015 15:42 5/31/2017 15:18 8/3/2018 09:30   Vit D, 25-Hydroxy  ng/mL 28 (L) 40.2 29.6 (L)      3/6/2017 16:33 2/16/2018 14:47 8/3/2018 09:30 12/30/2019 12:45   T3, Total  1.04     TSH 0.45 0.18 (L) 2.20 5.23 (H)   T4 Free  1.4  1.1      6/11/2020 20:40   SARS-CoV-2 Not Detected     Results for POC orders placed in visit on 11/24/20   POCT Rapid Drug Screen   Result Value Ref Range    Alcohol, Urine negative     Amphetamine Screen, Urine negative     Barbiturate Screen, Urine negative     Benzodiazepine Screen, Urine positive     Buprenorphine Urine negative     Cocaine Metabolite Screen, Urine negative     FENTANYL SCREEN, URINE negative     Gabapentin Screen, Urine negative     MDMA, Urine negative     Methadone Screen, Urine negative     Methamphetamine, Urine negative     Opiate Scrn, Ur negative     Oxycodone Screen, Ur negative     PCP Screen, Urine negative     Propoxyphene Screen, Urine negative     Synthetic Cannabinoids (K2) Screen, Urine negative     THC Screen, Urine negative     Tramadol Scrn, Ur negative     Tricyclic Antidepressants, Urine negative      Assessment:      1. Chronic lymphocytic thyroiditis    2. Psychophysiological insomnia    3. Anxiety    4. Chronic SI joint pain    5. Chronic bilateral low back pain without sciatica    6. Coccyx pain    7. Amenorrhea    8. Educated about COVID-19 virus infection    9. Encounter for long-term (current) use of high-risk medication          Plan:    881  - Counseling More Than 50% of the 40 min Appointment Time     Betsy Grace was seen today for anxiety and lower back pain. Diagnoses and all orders for this visit:    Chronic lymphocytic thyroiditis  Slightly high TSH last time. Recheck today. -     Continue meds and lifestyle control. Psychophysiological insomnia  Continue as needed alprazolam.    Anxiety  Continue as needed alprazolam.    Chronic SI joint pain  Chronic bilateral low back pain without sciatica  Coccyx pain  Follow-up with the specialist after the new year. Controlled Substance Monitoring:  Acute and Chronic Pain Monitoring:   RX Monitoring 11/28/2020   Attestation -   Periodic Controlled Substance Monitoring Possible medication side effects, risk of tolerance/dependence & alternative treatments discussed. ;No signs of potential drug abuse or diversion identified. ;Assessed functional status. ;Obtaining appropriate analgesic effect of treatment. ;Random urine drug screen sent today. Chronic Pain > 80 MEDD -     Encounter for long-term (current) use of high-risk medication    Amenorrhea  Or hypomenorrhea. COVID-19   The risk is likely to increase for COVID-19 infection as the reopening occurs. This is because while some people are being careful with social distancing masks and social isolation other people are totally ignoring it.      Preventing the Spread of Coronavirus Disease 2019 in Homes and Residential Communities   For the most recent information go to [employer]    Prevention steps for People with confirmed or suspected COVID-19 (including persons under investigation) who do not need to be hospitalized  and   People with confirmed COVID-19 who were hospitalized and determined to be medically stable to go home    Your healthcare provider and public health staff will evaluate whether you can be cared for at home. If it is determined that you do not need to be hospitalized and can be isolated at home, you will be monitored by staff from your local or state health department. You should follow the prevention steps below until a healthcare provider or local or state health department says you can return to your normal activities. Stay home except to get medical care  People who are mildly ill with COVID-19 are able to isolate at home during their illness. You should restrict activities outside your home, except for getting medical care. Do not go to work, school, or public areas. Avoid using public transportation, ride-sharing, or taxis. Separate yourself from other people and animals in your home  People: As much as possible, you should stay in a specific room and away from other people in your home. Also, you should use a separate bathroom, if available. Animals: You should restrict contact with pets and other animals while you are sick with COVID-19, just like you would around other people. Although there have not been reports of pets or other animals becoming sick with COVID-19, it is still recommended that people sick with COVID-19 limit contact with animals until more information is known about the virus. When possible, have another member of your household care for your animals while you are sick. If you are sick with COVID-19, avoid contact with your pet, including petting, snuggling, being kissed or licked, and sharing food.  If you must care for your pet or be around animals while you are sick, wash your hands before and after you interact with pets and wear a facemask. Call ahead before visiting your doctor  If you have a medical appointment, call the healthcare provider and tell them that you have or may have COVID-19. This will help the healthcare providers office take steps to keep other people from getting infected or exposed. Wear a facemask  You should wear a facemask when you are around other people (e.g., sharing a room or vehicle) or pets and before you enter a healthcare providers office. If you are not able to wear a facemask (for example, because it causes trouble breathing), then people who live with you should not stay in the same room with you, or they should wear a facemask if they enter your room. Cover your coughs and sneezes  Cover your mouth and nose with a tissue when you cough or sneeze. Throw used tissues in a lined trash can. Immediately wash your hands with soap and water for at least 20 seconds or, if soap and water are not available, clean your hands with an alcohol-based hand  that contains at least 60% alcohol. Clean your hands often  Wash your hands often with soap and water for at least 20 seconds, especially after blowing your nose, coughing, or sneezing; going to the bathroom; and before eating or preparing food. If soap and water are not readily available, use an alcohol-based hand  with at least 60% alcohol, covering all surfaces of your hands and rubbing them together until they feel dry. Soap and water are the best option if hands are visibly dirty. Avoid touching your eyes, nose, and mouth with unwashed hands. Avoid sharing personal household items  You should not share dishes, drinking glasses, cups, eating utensils, towels, or bedding with other people or pets in your home. After using these items, they should be washed thoroughly with soap and water.   Clean all high-touch surfaces everyday  High touch surfaces include counters, tabletops, doorknobs, bathroom fixtures, toilets, phones, keyboards, tablets, and bedside tables. Also, clean any surfaces that may have blood, stool, or body fluids on them. Use a household cleaning spray or wipe, according to the label instructions. Labels contain instructions for safe and effective use of the cleaning product including precautions you should take when applying the product, such as wearing gloves and making sure you have good ventilation during use of the product. Monitor your symptoms  Seek prompt medical attention if your illness is worsening (e.g., difficulty breathing). Before seeking care, call your healthcare provider and tell them that you have, or are being evaluated for, COVID-19. Put on a facemask before you enter the facility. These steps will help the healthcare providers office to keep other people in the office or waiting room from getting infected or exposed. Ask your healthcare provider to call the local or state health department. Persons who are placed under active monitoring or facilitated self-monitoring should follow instructions provided by their local health department or occupational health professionals, as appropriate. When working with your local health department check their available hours. If you have a medical emergency and need to call 911, notify the dispatch personnel that you have, or are being evaluated for COVID-19. If possible, put on a facemask before emergency medical services arrive. Discontinuing home isolation  Patients with confirmed COVID-19 should remain under home isolation precautions until the risk of secondary transmission to others is thought to be low. The decision to discontinue home isolation precautions should be made on a case-by-case basis, in consultation with healthcare providers and state and local health departments. Use Tylenol NOT Ibuprofen/Aleve/aspirin for pain or fevers.   There is a theoretical decrease in the body's ability to fight the virus when you are infected if you are on NSAIDs. The FDA has said that this information is not yet proven. The newest evidence suggest that wearing a mask in public may be beneficial if you remember that the outside of it is contaminated and treat it accordingly. It also helps prevent spread if someone has an asymptomatic or presymptomatic case and does not know it yet. Even cloth masks can be beneficial.    Advance Care Planning  People with COVID-19 may have no symptoms, mild symptoms, such as fever, cough, and shortness of breath or they may have more severe illness, developing severe and fatal pneumonia. As a result, Advance Care Planning with attention to naming a health care decision maker (someone you trust to make healthcare decisions for you if you could not speak for yourself) and sharing other health care preferences is important BEFORE a possible health crisis. Please contact your Primary Care Provider to discuss Advance Care Planning. Vitamin D by mouth and vitamin C intravenously are being used as part of the treatment regimen for COVID-19 in some hospitalized patients. Start 4000 IU once daily and recheck in 1 year. Also start vitamin C 500 mg daily. Both of these should be continued for the duration of the moderate risk portion of the COVID-19 pandemic expected to be 2 years according to the [employer]. Continue wearing a mask when around people except those living in the same house who are not infected with or heavily exposed to COVID-19, handwashing/hand gel use, social distancing, and social isolation for nonessential activities. IF you develop ANY respiratory infection symptoms (not just allergy symptoms) suggestive of COVID-19 start the recommendations below:                                            Instructions for Respiratory Infections (SAVE THIS SHEET)    For the first 7-14 days of symptoms follow instructions below, even before being seen in the office or even during treatment with antibiotics, until symptom free. 1. Water: Drink 1 ounce of water for every 2 pounds of body weight for adults, you need 50 Ounces of water/fluids per day. This will loosen mucus in the head and chest & improve the weak feeling of dehydration, allow the body to get germ fighting resources to the infection. Half of fluids can be juice or sugar free Crystal Light. Don't count drinks with caffeine, alcohol or carbonation. Infants can have Pedialyte liquid or freezer pops. Avoid salt and sports drinks if you have high Blood Pressure, swelling in the feet or ankles or have heart problems. 2. Humidity: Humidify the air to 35-50% ( or until the windows fog over slightly). Can use a humidifier, vaporizer, boil water on the stove or put a coffee can full of water on the heater vents. This will loosen mucus from infections and allergies. 3. Sleep: Get 8-10 hours a night and rest during the evening after work or school. If you have trouble sleeping, adults can take Melatonin 5mg up to 2 tabs at bedtime ( not for children or pregnant women). If Mono is suspected then sleep during 9PM to 9AM time span (if possible.)  4. Cough: Take cough medicines with Guaifenesin ( to loosen chest or head congestion) and Dextromethorphan ( to decrease excess cough). Robitussin D.M. Syrup every 4-6 hrs OR Mucinex D. M. pills OR Delsym DM syrup twice a day. Use the pediatric formulations for children over 6 months making sure they are alcohol & sugar free for children, pregnant women, and diabetics. 5. Pain And Fevers: Take Acetaminophen ( Tylenol) for fevers, aches, and headaches. 2-500 mg every 8 hours for adults. Appropriate doses at bedtime for children may help them sleep better. If pregnant take 1 -500 mg (Tylenol) every 8 hours as needed.  Ibuprofen/Aleve/aspirin for pain and fevers SHOULD NOT BE USED IN THE SETTING OF POSSIBLE COVID-19 viral infection NOR if pregnant, if you have acid reflux, high blood pressure, CHF, or kidney problems. 6.Gargle: (DAY ONE OF SYMPTOMS) Gargle in the back of the throat with the head tilted back and to the sides with a strong mouthwash  ( Listerine or Scope) after meals and at bedtime at least 4 -5 times a day. This helps kill bacteria and viruses in the back of the throat and will shorten the duration and decrease the severity of your symptoms: sore throat, cough, ear popping,/ear pain, and possibly dizziness. 7. Smoking: Avoid smoking or exposure to second hand smoke. 8. Zinc: (DAY ONE OF SYMPTOMS)  Zinc lozenges such as Cold Melchor (available most stores), or Basic ([employer] brand) will help shorten the duration and lessen symptoms such as sore throat, cough, nasal congestion, runny nose, and post nasal drip. Use 1 lozenge every 2-4 hours ( after meals if stomach is sensitive). Children can use 10-15 mg or less 3-4 times a day or Zinc lollypops. In pregnancy limit to 50-60 mg a day for 7 days as prenatals have Zinc also. With diarrhea use zinc pills 50 mg 1/2 to 1 pill 2x/day. 9. Vitamins: Vitamin C 500 mg with breakfast and dinner (with suspected or diagnosed COVID-19 infection take vitamin C 1000 mg 2-3 times a day). Children and pregnant women should drink citrus juices. This speeds healing and strengthens immune system. 10. Chest Symptoms: Vicks Vapor rub to the chest at bedtime. 11. Decongestants: Avoid all decongestants and antihistamine cold preparations in children. Decongestants should always be avoided in people with high blood pressure, palpitations, heart disease and those on stimulant medications. Antihistamines may impede the body's ability to fight COVID-19.  12. Frequent hand washing and/or hand gel especially after coughing or sneezing into the hands or blowing the nose to help prevent spreading to others. Use kleenex and NOT handkerchiefs. Try all of the above starting with day 1 of symptoms.  If Strep throat symptoms appear call to be seen in the office as soon as possible and don't gargle on that day. Newborns, infants, or anyone with earaches or influenza may need to be seen quickly. Adults with fevers over 103 degrees or shortness of breath should call the office immediately.     Nutrients that support the immune system:  Beta carotene/vitamin A  Vitamin C  Vitamin D  Zinc  Proteins  Probiotics/prebiotic's(prebiotics are fiber sources that support the growth of probiotics)  Water from all sources including foods such as fruit: Women 2.7 L / 91 ounces per day AND men 3.7 L/125 ounces per day  Source Chiquis FREEDMAN (award winning nutritionist/registered dietitian, author, ) 3/25/2020       Joanne Finch DO

## 2020-11-24 NOTE — LETTER
CONTROLLED SUBSTANCE MEDICATION AGREEMENT     Patient Name: Becca Carmichael  Patient YOB: 1980   I understand, that controlled substance medications may be used to help better manage my symptoms and to improve my ability to function at home, work and in social settings. However, I also understand that these medications do have risks, which have been discussed with me, including possible development of physical or psychological dependence. I understand that successful treatment requires mutual trust and honesty between me and my provider. I understand and agree that following this Medication Agreement is necessary in continuing my provider-patient relationship and the success of my treatment plan. Explanation from my Provider: Benefits and Goals of Controlled Substance Medications: There are two potential goals for your treatment: (1) decreased pain and suffering (2) improved daily life functions. There are many possible treatments for your chronic condition(s). Alternatives such as physical therapy, yoga, massage, home daily exercise, meditation, relaxation techniques, injections, chiropractic manipulations, surgery, cognitive therapy, hypnosis and many medications that are not habit-forming may be used. Use of controlled substance medications may be helpful, but they are unlikely to resolve all symptoms or restore all function. Explanation from my Provider: Risks of Controlled Substance Medications:  Opioid pain medications: These medications can lead to problems such as addiction/dependence, sedation, lightheadedness/dizziness, memory issues, falls, constipation, nausea, or vomiting. They may also impair the ability to drive or operate machinery. Additionally, these medications may lower testosterone levels, leading to loss of bone strength, stamina and sex drive.   They may cause problems with breathing, sleep apnea and reduced coughing, which is especially dangerous for patients with lung disease. Overdose or dangerous interactions with alcohol and other medications may occur, leading to death. Hyperalgesia may develop, which means patients receiving opioids for the treatment of pain may become more sensitive to certain painful stimuli, and in some cases, experience pain from ordinarily non-painful stimuli. Women between the ages of 14-53 who could become pregnant should carefully weigh the risks and benefits of opioids with their physicians, as these medications increase the risk of pregnancy complications, including miscarriage,  delivery and stillbirth. It is also possible for babies to be born addicted to opioids. Opioid dependence withdrawal symptoms may include; feelings of uneasiness, increased pain, irritability, belly pain, diarrhea, sweats and goose-flesh. Benzodiazepines and non-benzodiazepine sleep medications: These medications can lead to problems such as addiction/dependence, sedation, fatigue, lightheadedness, dizziness, incoordination, falls, depression, hallucinations, and impaired judgment, memory and concentration. The ability to drive and operate machinery may also be affected. Abnormal sleep-related behaviors have been reported, including sleepwalking, driving, making telephone calls, eating, or having sex while not fully awake. These medications can suppress breathing and worsen sleep apnea, particularly when combined with alcohol or other sedating medications, potentially leading to death. Dependence withdrawal symptoms may include tremors, anxiety, hallucinations and seizures. Stimulants:  Common adverse effects include addiction/dependence, increased blood  pressure and heart rate, decreased appetite, nausea, involuntary weight loss, insomnia,                                                                                                                     Initials:_______   irritability, and headaches.   These risks may increase when these medications are combined with other stimulants, such as caffeine pills or energy drinks, certain weight loss supplements and oral decongestants. Dependence withdrawal symptoms may include depressed mood, loss of interest, suicidal thoughts, anxiety, fatigue, appetite changes and agitation. Testosterone replacement therapy:  Potential side effects include increased risk of stroke and heart attack, blood clots, increased blood pressure, increased cholesterol, enlarged prostate, sleep apnea, irritability/aggression and other mood disorders, and decreased fertility. I agree and understand that I and my prescriber have the following rights and responsibilities regarding my treatment plan:     1. MY RIGHTS:  To be informed of my treatment and medication plan. To be an active participant in my health and wellbeing. 2. MY RESPONSIBILITY AND UNDERSTANDING FOR USE OF MEDICATIONS   I will take medications at the dose and frequency as directed. For my safety, I will not increase or change how I take my medications without the recommendation of my healthcare provider.  I will actively participate in any program recommended by my provider which may improve function, including social, physical, psychological programs.  I will not take my medications with alcohol or other drugs not prescribed to me. I understand that drinking alcohol with my medications increases the chances of side effects, including reduced breathing rate and could lead to personal injury when operating machinery.  I understand that if I have a history of substance use disorders, including alcohol or other illicit drugs, that I may be at increased risk of addiction to my medications.  I agree to notify my provider immediately if I should become pregnant so that my treatment plan can be adjusted.    I agree and understand that I shall only receive controlled substance medications from the prescriber that signed this agreement unless there is [email or web address]    5. MY RESPONSIBILITY WITH ILLEGAL DRUGS    I will not use illegal or street drugs or another person's prescription medications not prescribed to me.  If there are identified addiction type symptoms, then referral to a program may be provided by my provider and I agree to follow through with this recommendation. 6. MY RESPONSIBILITY FOR COOPERATION WITH INVESTIGATIONS   I understand that my provider will comply with any applicable law and may discuss my use and/or possible misuse/abuse of controlled substances and alcohol, as appropriate, with any health care provider involved in my care, pharmacist, or legal authority.  I authorize my provider and pharmacy to cooperate fully with law enforcement agencies (as permitted by law) in the investigation of any possible misuse, sale, or other diversion of my controlled substances.  I agree to waive any applicable privilege or right of privacy or confidentiality with respect to these authorizations. 7. PROVIDERS RIGHT TO MONITOR FOR SAFETY: PRESCRIPTION MONITORING / DRUG TESTING   I consent to drug/toxicology screening and will submit to a drug screen upon my providers request to assure I am only taking the prescribed drugs for my safety monitoring. I understand that a drug screen is a laboratory test in which a sample of my urine, blood or saliva is checked to see what drugs I have been taking. This may entail an observed urine specimen, which means that a nurse or other health care provider may watch me provide urine, and I will cooperate if I am asked to provide an observed specimen.  I understand that my provider will check a copy of my State Prescription Monitoring Program () Report in order to safely prescribe medications.  Pill Counts: I consent to pill counts when requested.   I may be asked to bring all my prescribed controlled substance medications, in their original bottles, to all of my scheduled appointments. In addition, my provider may ask me to come to the practice at any time for a random pill count. 8. TERMINATION OF THIS AGREEMENT  For my safety, my prescriber has the right to stop prescribing controlled substance medications and may end this agreement. Initials:_______   Conditions that may result in termination of this agreement:  a. I do not show any improvement in pain, or my activity has not improved. b. I develop rapid tolerance or loss of improvement, as described in my treatment plan.  c. I develop significant side effects from the medication. d. My behavior is not consistent with the responsibilities outlined above, thereby causing safety concerns to continue prescribing controlled substance medications. e. I fail to follow the terms of this agreement. f. Other:____________________________       UNDERSTANDING THIS MEDICATION AGREEMENT:    I have read the above and have had all my questions answered. For chronic disease management, I know that my symptoms can be managed with many types of treatments. A chronic medication trial may be part of my treatment, but I must be an active participant in my care. Medication therapy is only one part of my symptom management plan. In some cases, there may be limited scientific evidence to support the chronic use of certain medications to improve symptoms and daily function. Furthermore, in certain circumstances, there may be scientific information that suggests that the use of chronic controlled substances may worsen my symptoms and increase my risk of unintentional death directly related to this medication therapy. I know that if my provider feels my risk from controlled medications is greater than my benefit, I will have my controlled substance medication(s) compassionately lowered or removed altogether.      I further agree to allow this office to contact my HIPAA contact if there are concerns about my safety and use of the controlled medications. I have agreed to use the prescribed controlled substance medications to me as instructed by my provider and as stated in this Medication Agreement. My initial on each page and my signature below shows that I have read each page and I have had the opportunity to ask questions with answers provided by my provider.     Patient Name (Printed): _____________________________________  Patient Signature:  ______________________   Date: _____________    Prescriber Name (Printed): ___________________________________  Prescriber Signature: _____________________  Date: _____________

## 2020-11-27 LAB
6-ACETYLMORPHINE: NOT DETECTED
7-AMINOCLONAZEPAM: NOT DETECTED
ALPHA-OH-ALPRAZOLAM: PRESENT
ALPRAZOLAM: PRESENT
AMPHETAMINE: NOT DETECTED
BARBITURATES: NOT DETECTED
BENZOYLECGONINE: NOT DETECTED
BUPRENORPHINE: NOT DETECTED
CARISOPRODOL: NOT DETECTED
CLONAZEPAM: NOT DETECTED
CODEINE: NOT DETECTED
CREATININE URINE: 53.5 MG/DL (ref 20–400)
DIAZEPAM: NOT DETECTED
DRUGS EXPECTED: NORMAL
EER PAIN MGT DRUG PANEL, HIGH RES/EMIT U: NORMAL
ETHYL GLUCURONIDE: NOT DETECTED
FENTANYL: NOT DETECTED
HYDROCODONE: NOT DETECTED
HYDROMORPHONE: NOT DETECTED
LORAZEPAM: NOT DETECTED
MARIJUANA METABOLITE: NOT DETECTED
MDA: NOT DETECTED
MDEA: NOT DETECTED
MDMA URINE: NOT DETECTED
MEPERIDINE: NOT DETECTED
METHADONE: NOT DETECTED
METHAMPHETAMINE: NOT DETECTED
METHYLPHENIDATE: NOT DETECTED
MIDAZOLAM: NOT DETECTED
MORPHINE: NOT DETECTED
NORBUPRENORPHINE, FREE: NOT DETECTED
NORDIAZEPAM: NOT DETECTED
NORFENTANYL: NOT DETECTED
NORHYDROCODONE, URINE: NOT DETECTED
NOROXYCODONE: NOT DETECTED
NOROXYMORPHONE, URINE: NOT DETECTED
OXAZEPAM: NOT DETECTED
OXYCODONE: NOT DETECTED
OXYMORPHONE: NOT DETECTED
PAIN MANAGEMENT DRUG PANEL: NORMAL
PAIN MANAGEMENT DRUG PANEL: NORMAL
PCP: NOT DETECTED
PHENTERMINE: NOT DETECTED
PROPOXYPHENE: NOT DETECTED
TAPENTADOL, URINE: NOT DETECTED
TAPENTADOL-O-SULFATE, URINE: NOT DETECTED
TEMAZEPAM: NOT DETECTED
TRAMADOL: PRESENT
ZOLPIDEM: NOT DETECTED

## 2021-02-08 DIAGNOSIS — J30.1 SEASONAL ALLERGIC RHINITIS DUE TO POLLEN: ICD-10-CM

## 2021-02-08 RX ORDER — FLUTICASONE PROPIONATE 50 MCG
SPRAY, SUSPENSION (ML) NASAL
Qty: 16 G | Refills: 3 | Status: SHIPPED | OUTPATIENT
Start: 2021-02-08

## 2021-02-19 DIAGNOSIS — E06.3 CHRONIC LYMPHOCYTIC THYROIDITIS: Chronic | ICD-10-CM

## 2021-02-19 DIAGNOSIS — F51.04 PSYCHOPHYSIOLOGICAL INSOMNIA: ICD-10-CM

## 2021-02-19 RX ORDER — LEVOTHYROXINE SODIUM 0.07 MG/1
TABLET ORAL
Qty: 30 TABLET | Refills: 0 | Status: SHIPPED | OUTPATIENT
Start: 2021-02-19 | End: 2021-02-26 | Stop reason: SDUPTHER

## 2021-02-19 NOTE — TELEPHONE ENCOUNTER
PT CALLING TO SEE IF YOU WILL CALL THIS IN EARLY - THEY ARE LEAVING EARLY Sunday [facility] FOR OUT OF TOWN    [facility] [address] [place] [address], [facility] [address] - P [phone] - F [phone]    PT @  [phone]  - PLEASE LET HER KNOW

## 2021-02-19 NOTE — TELEPHONE ENCOUNTER
LAST VISIT 11/24/2020, NEXT VISIT 02/26/2021 WITH DR Rj Herrera.   [address] [place] [address]

## 2021-02-20 RX ORDER — ZOLPIDEM TARTRATE 10 MG/1
TABLET ORAL
Qty: 30 TABLET | Refills: 0 | OUTPATIENT
Start: 2021-02-20 | End: 2021-04-20

## 2021-02-20 NOTE — TELEPHONE ENCOUNTER
Visit 11/24/2020. First fill on Ambien 12/1/2020. Next refill 12/29/2020 (due on 12/31/2020). Third refill 1/25/2021 (due on 1/30/2021). This should last until 3/2/2021.

## 2021-02-26 ENCOUNTER — VIRTUAL VISIT (OUTPATIENT)
Dept: FAMILY MEDICINE CLINIC | Age: 41
End: 2021-02-26
Payer: COMMERCIAL

## 2021-02-26 DIAGNOSIS — E06.3 CHRONIC LYMPHOCYTIC THYROIDITIS: Chronic | ICD-10-CM

## 2021-02-26 DIAGNOSIS — G89.29 CHRONIC BILATERAL LOW BACK PAIN WITHOUT SCIATICA: ICD-10-CM

## 2021-02-26 DIAGNOSIS — F51.04 PSYCHOPHYSIOLOGICAL INSOMNIA: ICD-10-CM

## 2021-02-26 DIAGNOSIS — M54.50 CHRONIC BILATERAL LOW BACK PAIN WITHOUT SCIATICA: ICD-10-CM

## 2021-02-26 DIAGNOSIS — F41.9 ANXIETY: Chronic | ICD-10-CM

## 2021-02-26 DIAGNOSIS — M53.3 COCCYX PAIN: ICD-10-CM

## 2021-02-26 DIAGNOSIS — G89.29 CHRONIC SI JOINT PAIN: ICD-10-CM

## 2021-02-26 DIAGNOSIS — M53.3 CHRONIC SI JOINT PAIN: ICD-10-CM

## 2021-02-26 PROCEDURE — 99215 OFFICE O/P EST HI 40 MIN: CPT | Performed by: FAMILY MEDICINE

## 2021-02-26 RX ORDER — ALPRAZOLAM 0.5 MG/1
TABLET ORAL
Qty: 60 TABLET | Refills: 2 | Status: SHIPPED | OUTPATIENT
Start: 2021-02-26 | End: 2021-05-28 | Stop reason: DRUGHIGH

## 2021-02-26 RX ORDER — TRAMADOL HYDROCHLORIDE 50 MG/1
50-100 TABLET ORAL EVERY 6 HOURS PRN
Qty: 210 TABLET | Refills: 2 | Status: SHIPPED | OUTPATIENT
Start: 2021-02-26 | End: 2021-05-28 | Stop reason: SDUPTHER

## 2021-02-26 RX ORDER — ACETAMINOPHEN 160 MG
4000 TABLET,DISINTEGRATING ORAL DAILY
COMMUNITY

## 2021-02-26 RX ORDER — ZOLPIDEM TARTRATE 10 MG/1
TABLET ORAL
COMMUNITY
Start: 2021-01-25 | End: 2021-05-28 | Stop reason: SDUPTHER

## 2021-02-26 RX ORDER — DOCUSATE SODIUM 100 MG/1
200 CAPSULE, LIQUID FILLED ORAL
COMMUNITY

## 2021-02-26 RX ORDER — LEVOTHYROXINE SODIUM 0.07 MG/1
TABLET ORAL
Qty: 30 TABLET | Refills: 5 | Status: SHIPPED | OUTPATIENT
Start: 2021-02-26 | End: 2021-05-28 | Stop reason: SDUPTHER

## 2021-03-09 ENCOUNTER — PATIENT MESSAGE (OUTPATIENT)
Dept: FAMILY MEDICINE CLINIC | Age: 41
End: 2021-03-09

## 2021-03-11 NOTE — TELEPHONE ENCOUNTER
Call patient's insurance and get list of doctors who are on her insurance panel in psychiatry and in pain management. Asked that they also send their addresses so we can pick someone close to the patient.

## 2021-03-12 ENCOUNTER — TELEPHONE (OUTPATIENT)
Dept: FAMILY MEDICINE CLINIC | Age: 41
End: 2021-03-12

## 2021-03-12 NOTE — TELEPHONE ENCOUNTER
Patient is calling and it is extremely important she speaks with Dr. Everett Mayfield or a MA today. If she does not hear from us in a hour she will be calling back. [address] phone no. [phone]      She said last time we told her we will give her a call and we did not call her back.  JUST FYI

## 2021-03-12 NOTE — TELEPHONE ENCOUNTER
SPOKE TO PT SHE IS GOING TO BE GOING OUT OF TOWN, THEY ARE DRIVING DOWN. SHE IS ASKING FOR AN OVER RIDE FOR HER MEDICATION AMBIEN. SHE IS ASKING TO FILL THIS ON Sunday. THIS WILL BE A WEEK EARLY. THEY HAVE BEEN HAVING TO WORK WITH HUSBANDS WORK IN ORDER TO GET THIS VACATION PLANNED AND THEY WILL NOT BE ABLE TO GO AGAIN. IT IS FOR HER FATHERS 70TH BIRTHDAY AND SHE WILL BE IN [place]. IT IS VERY IMPORTANT SHE IS CALLED BACK TODAY. Mary Ann Oconnell

## 2021-03-21 NOTE — PROGRESS NOTES
Kaitlin De La Rosa (:  1980) is a 41 y.o. female,Established patient, here for evaluation of the following chief complaint(s): Anxiety (3 MO ANXIETY ROUTINE FOLLOW UP), Hypothyroidism (3 MO HYPOTHYROIDISM ROUTINE FOLLOW UP), and Other (DEPRESSION SCREENING DUE)    ASSESSMENT/PLAN:  145    Patient Instructions   Savana Rojas was seen today for anxiety, hypothyroidism and other. Diagnoses and all orders for this visit:    Anxiety  -     ALPRAZolam (XANAX) 0.5 MG tablet; TAKE 0.5-1 TABLET BY MOUTH AS NEEDED FOR SLEEP OR  ANXIETY EVERY 6 HRS FOR UP TO 2 PILLS/DAY  Not well controlled. See psychiatry. Call your insurance company and find out who is on your list of providers for psychiatry. You can then go through and check them out on the Internet. They have rating scores for how well liked the physicians are usually in the 1-5 stars rating range. Once you have chosen someone then confirm that you can get in with them. Many people listed on insurance companies list of psychiatric providers are not taking new patients. Once you confirm you can get in we will refer you to the provider of your choice. If you have a long wait time and you want to see someone you can see our in office psychiatric nurse practitioner. Currently I believe she is only seeing people by video. She can prescribe any medicine that we can or any other psychiatrist can. Her name is Antonina Victor. She is well liked by most of the people we send to her. List chronic SI joint pain  -     traMADol (ULTRAM) 50 MG tablet; Take 1-2 tablets by mouth every 6 hours as needed for Pain for up to 90 days. -     XR SACROILIAC JOINTS (MIN 3 VIEWS); Future  -     Reinier Millan MD, Pain Management, [facility]  Dr. Marino Hyde does not do very many procedures. He manages patients medically and refers them out for procedures if needed. Chronic bilateral low back pain without sciatica  -     traMADol (ULTRAM) 50 MG tablet;  Take 1-2 tablets by mouth every 6 hours as needed for Pain for up to 90 days. -     XR SACROILIAC JOINTS (MIN 3 VIEWS); Future  -     Paola Black MD, Pain Management, [facility]    Coccyx pain  -     traMADol (ULTRAM) 50 MG tablet; Take 1-2 tablets by mouth every 6 hours as needed for Pain for up to 90 days. -     XR SACROILIAC JOINTS (MIN 3 VIEWS); Future  -     Paola Black MD, Pain Management, [facility]    Chronic lymphocytic thyroiditis  -     levothyroxine (SYNTHROID) 75 MCG tablet; TAKE 1 TABLET BY MOUTH EVERY DAY  -     Good control at last check. -     Continue meds and lifestyle control. Psychophysiological insomnia  Continue zolpidem 10 mg nightly as needed. Talk with psychiatrist about trouble sleeping also. COVID-19 vaccine  Get the vaccine as soon as possible. If you had COVID-19 you should not need the vaccination for 90 days afterwards. This is because you already have antibodies to the virus. The first shot usually is only associated with soreness in the arm unless you have already had COVID-19. Then your reaction may be similar to getting the second shot as below. There is a risk of allergic reaction so everybody is supposed to stay at the vaccination site for 30 minutes after getting the vaccine. It would be a great idea to have Benadryl 25 g with you to take IF you have a milder allergic reaction after you leave the site. If you use an EpiPen take it with you. Using an EpiPen is not a contraindication to the vaccine only a precaution. Notify them that you use an EpiPen when they give you the shot. The second shot can be associated with flulike symptoms - but you CANNOT get COVID-19 from the vaccination. The flulike symptoms are your body's response to the RNA injected which has already produced antibodies after the first shot. It is better to have a few days of flulike symptoms than a few weeks on a ventilator.   My personal preference is for [employer] or Moderna shots. But any port in the storm. Return in about 3 months (around 5/26/2021) for Thyroid, Anxiety (if unable to see psychiatry), chronic pain (if unable to see pain DrSilvia).    SUBJECTIVE/OBJECTIVE:  Chief Complaint   Patient presents with    Anxiety     3 MO ANXIETY ROUTINE FOLLOW UP    Hypothyroidism     3 MO HYPOTHYROIDISM ROUTINE FOLLOW UP    Other     DEPRESSION SCREENING DUE   102  HPI    Anxiety  Has been worse mornings because her stepfather and her dog have medical issues. Her dog is very sick with kidney problems. And she has her own issues. Not being able to go to the doctor like she plans to figure out what is going on. She has spent too much money taking care of her dog. She has not been able to see a doctor. 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 She thinks she wants to see psychiatry. Chronic SI joint pain / Chronic bilateral low back pain without sciatica / Coccyx pain  Thinks she would like to see pain management. She fell on the same area around the left sacrum 5/22/21. She thinks she could injured the IS area. Wants to get an X-ray. Chronic lymphocytic thyroiditis  Feels cold most of the time for the whole year. No more than anyone else most of the time. BM qod. Is on stool softener. Takes 1 tab oral prior to breakfast with 8 oz water on empty stomach. Doesn't take food/drinks/meds/supplements for 30 min. Insomnia  Was to go to [place] and couldn't. Plans to go on Monday. Last pap 8/2019. Never had a flu shot in spite of the chart saying 2015. Review of Systems   Constitutional: Negative for unexpected weight change. Endocrine: Negative for cold intolerance and heat intolerance. Review of systems in history of present illness or scanned in under media tab. Past Medical History:   Diagnosis Date    Cervical stenosis (uterine cervix) 1/1/2014    Chronic lymphocytic thyroiditis     DDD (degenerative disc disease), lumbar 04/10/2018    ?  L3-4     Depression     Drug overdose, intentional ([facility] [address] 2015    found in [address] Fibroid uterus 2018    ON MRI no sx.  Genital herpes     GERD (gastroesophageal reflux disease)     Hypercholesterolemia 2013    Infertility     Vitamin D deficiency 2015       Past Surgical History:   Procedure Laterality Date    HYSTEROSCOPY Bilateral     hystosalpingram    HYSTEROSCOPY N/A 02/15/2019    cervical stenosis & check for fibroids    LUMBAR SPINE SURGERY  2018    MIKO    LUMBAR SPINE SURGERY  2019    SI joint injections with fluroscopy       Social History     Socioeconomic History    Marital status:      Spouse name: Liu Min Number of children: 0    Years of education: 15    Highest education level: Not on file   Occupational History    Occupation: Sales  - sales     Comment: Summer     Occupation: Unemployed     Comment: 2019    Occupation: [employer]     Comment: trying to go back (winter)    Occupation: [employer] sales     Comment: Summer (2nd job) (stopped 10/2020)   Ayanna Mallory 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 Substance and Sexual Activity    Alcohol use: No     Comment: Quit drinking 2012. Was drinking 8-10 beers every other day for 2 months. Rarely drinks (holidays) then just a couple.      Drug use: No    Sexual activity: Yes     Partners: Male   Lifestyle    Physical activity     Days per week: Not on file     Minutes per session: Not on file    Stress: Not on file   Relationships    Social connections     Talks on phone: Not on file     Gets together: Not on file     Attends Amish service: Not on file     Active member of club or organization: Not on file     Attends meetings of clubs or organizations: Not on file     Relationship status: Not on file    Intimate partner violence     Fear of current or ex partner: Not on file     Emotionally abused: Not on file     Physically abused: Not on file     Forced sexual activity: Not on file   Other Topics Concern    Not on file   Social History Narrative    No exercise. 5/31/17. 8/25/17. 11/17/17. Stretches only. 2/16/18. Daily activity. 7/31/18. Not working. Can't sit or stand for a long time. 11/9/18 Step dad had aneurism in brain. No exercise. 2/12/19. Had to stop Stretches. 4/22/19. In PT for pelvic floor. 7/25/19. New provider. 10/7/19. Walks on treadmill 4x/wk 20-25 min. 3/24/20. Now 30 min 4x/wk 1-2 mph. 6/19/20.9/11/20. Yoga and stretches. 11/24/20. And 1.2 mph 10-15 min 4x/wk. 2/26/21. Family History   Problem Relation Age of Onset    Thyroid Disease Mother         CLT    High Cholesterol Mother     Anxiety Disorder Mother     Alcohol Abuse Father     Other Father         GB surg. + Cologuard w/ colon     High Blood Pressure Brother     Other Brother         GERD    Kidney Disease Brother         CKD - cause    Osteoporosis Maternal Grandmother     COPD Maternal Grandmother     Diabetes Maternal Grandmother     High Blood Pressure Maternal Grandmother     High Cholesterol Maternal Grandmother     Cancer Maternal Grandmother         precancer skin lesions.     Kidney Cancer Maternal Grandmother         nephrectomy    High Blood Pressure Maternal Grandfather     Clotting Disorder Maternal Grandfather     No Known Problems Sister     Heart Failure Paternal Grandmother     Thyroid Disease Paternal Grandmother     Stroke Paternal Grandmother     Dementia Paternal Grandmother     Osteoporosis Maternal Aunt     Anxiety Disorder Maternal Aunt     Breast Cancer Maternal Aunt 79        Rare and progressive    No Known Problems Brother     No Known Problems Brother        No Known Allergies    Current Outpatient Medications   Medication Sig Dispense Refill    zolpidem (AMBIEN) 10 MG tablet TAKE 1 TABLET BY MOUTH AT BEDTIME AS NEEDED FOR SLEEP      ALPRAZolam (XANAX) 0.5 MG tablet TAKE 0.5-1 TABLET BY MOUTH AS NEEDED FOR SLEEP OR  ANXIETY EVERY 6 HRS FOR UP TO 2 PILLS/DAY 60 tablet 2    traMADol (ULTRAM) 50 MG tablet Take 1-2 tablets by mouth every 6 hours as needed for Pain for up to 90 days. 210 tablet 2    levothyroxine (SYNTHROID) 75 MCG tablet TAKE 1 TABLET BY MOUTH EVERY DAY 30 tablet 5    Cholecalciferol (VITAMIN D3) 50 MCG (2000 UT) CAPS Take 4,000 Units by mouth daily Indications: Vitamin D Deficiency Takes intermittently      docusate sodium (COLACE) 100 MG capsule Take 200 mg by mouth      fluticasone (FLONASE) 50 MCG/ACT nasal spray SHAKE LIQUID AND USE 1 SPRAY IN EACH NOSTRIL DAILY 16 g 3     No current facility-administered medications for this visit.       Patient-Reported Vitals 2/26/2021   Patient-Reported Weight 110   Patient-Reported Height 5 1      BP Readings from Last 3 Encounters:   11/24/20 118/78   03/24/20 112/70   12/30/19 110/70     Pulse Readings from Last 3 Encounters:   11/24/20 70   06/11/20 80   03/24/20 85     Wt Readings from Last 3 Encounters:   11/24/20 113 lb (51.3 kg)   03/24/20 107 lb (48.5 kg)   12/30/19 111 lb 12.8 oz (50.7 kg)     Physical Exam    [INSTRUCTIONS:  \"[x]\" Indicates a positive item  \"[]\" Indicates a negative item  -- DELETE ALL ITEMS NOT EXAMINED]    Constitutional: [x] Appears well-developed and well-nourished [x] No apparent distress      [] Abnormal -     Mental status: [x] Alert and awake  [x] Oriented to person/place/time [x] Able to follow commands    [] Abnormal - Eyes:   EOM    [x]  Normal    [] Abnormal -   Sclera  [x]  Normal    [] Abnormal -          Discharge [x]  None visible   [] Abnormal -     HENT: [x] Normocephalic, atraumatic  [] Abnormal -   [] Mouth/Throat: Mucous membranes are moist    External Ears [] Normal  [] Abnormal -    Neck: [x] No visualized mass [] Abnormal -     Pulmonary/Chest: [x] Respiratory effort normal   [x] No visualized signs of difficulty breathing or respiratory distress        [] Abnormal -      Musculoskeletal:   [] Normal gait with no signs of ataxia         [] Normal range of motion of neck        [x] Abnormal -      Neurological:        [x] No Facial Asymmetry (Cranial nerve 7 motor function) (limited exam due to video visit)          [x] No gaze palsy        [] Abnormal -          Skin:        [x] No significant exanthematous lesions or discoloration noted on facial skin         [] Abnormal -            Psychiatric:       [x] Normal Affect [] Abnormal -        [x] No Hallucinations    Other pertinent observable physical exam findings:-     11/24/2020 10:19 11/24/2020 10:40 11/24/2020 10:40   FSH 9.9     TSH 1.03     Barbiturates  Not Detected    Benzoylecgonine  Not Detected    Codeine  Not Detected    Ethyl Glucuronide  Not Detected    Marijuana Metabolite  Not Detected    MDA  Not Detected    MDEA  Not Detected    Methadone  Not Detected    Morphine  Not Detected    Norbuprenorphine  Not Detected    Propoxyphene  Not Detected    Tramadol  Present    Amphetamine  Not Detected    Hydrocodone  Not Detected    Buprenorphine  Not Detected    Tapentadol-O-Sulfate, Urine  Not Detected    MDMA, Urine  Not Detected    [facility] Pain Mgt Drug Panel, High Res/Emit U  See Note    7-aminoclonazepam  Not Detected    NORHYDROCODONE, URINE  Not Detected    NOROXYMORPHONE, URINE  Not Detected    PCP  Not Detected    Alprazolam  Present    Diazepam Lvl  Not Detected    Hydromorphone  Not Detected    Meperidine  Not Detected    Methylphenidate  Not Detected    Nordiazepam Lvl  Not Detected    Norfentanyl  Not Detected    Oxycodone  Not Detected    Fentanyl  Not Detected    Lorazepam  Not Detected    Methamphetamine  Not Detected    Creatinine, Ur  53.5    6-Acetylmorphine  Not Detected    Alpha-OH-alprazolam  Present    CARISOPRODOL  Not Detected    Clonazepam  Not Detected    Drugs Expected  SEE REQ    Midazolam  Not Detected    Noroxycodone  Not Detected    OXAZEPAM  Not Detected    Oxymorphone  Not Detected    Pain Management Drug Panel  See Below Consistent   Phentermine  Not Detected    Tapentadol, Urine  Not Detected    TEMAZEPAM  Not Detected    Zolpidem  Not Detected      On this date 2/26/2021 I have spent 43 minutes reviewing previous notes, test results and face to face (virtual) with the patient discussing the diagnosis and importance of compliance with the treatment plan as well as documenting on the day of the visit. Magaly Welch was evaluated through a synchronous (real-time) audio-video encounter. The patient (or guardian if applicable) is aware that this is a billable service. Verbal consent to proceed has been obtained within the past 12 months. The visit was conducted pursuant to the emergency declaration under the [address] [place] [address], [address] [place] [address] authority and the [employer] Act. Patient identification was verified, and a caregiver was present when appropriate. The patient was located in a state where the provider was credentialed to provide care. An electronic signature was used to authenticate this note.     --Swetha Rosales, DO

## 2021-03-21 NOTE — PATIENT INSTRUCTIONS
Theron was seen today for anxiety, hypothyroidism and other. Diagnoses and all orders for this visit:    Anxiety  -     ALPRAZolam (XANAX) 0.5 MG tablet; TAKE 0.5-1 TABLET BY MOUTH AS NEEDED FOR SLEEP OR  ANXIETY EVERY 6 HRS FOR UP TO 2 PILLS/DAY  Not well controlled. See psychiatry. Call your insurance company and find out who is on your list of providers for psychiatry. You can then go through and check them out on the Internet. They have rating scores for how well liked the physicians are usually in the 1-5 stars rating range. Once you have chosen someone then confirm that you can get in with them. Many people listed on insurance [employer] list of psychiatric providers are not taking new patients. Once you confirm you can get in we will refer you to the provider of your choice. If you have a long wait time and you want to see someone you can see our in office psychiatric nurse practitioner. Currently I believe she is only seeing people by video. She can prescribe any medicine that we can or any other psychiatrist can. Her name is Pablo Thrasher. She is well liked by most of the people we send to her. List chronic SI joint pain  -     traMADol (ULTRAM) 50 MG tablet; Take 1-2 tablets by mouth every 6 hours as needed for Pain for up to 90 days. -     XR SACROILIAC JOINTS (MIN 3 VIEWS); Future  -     Cruz Patel MD, Pain Management, [facility]  Dr. Venkat Buenrostro does not do very many procedures. He manages patients medically and refers them out for procedures if needed. Chronic bilateral low back pain without sciatica  -     traMADol (ULTRAM) 50 MG tablet; Take 1-2 tablets by mouth every 6 hours as needed for Pain for up to 90 days. -     XR SACROILIAC JOINTS (MIN 3 VIEWS); Future  -     Cruz Patel MD, Pain Management, [facility]    Coccyx pain  -     traMADol (ULTRAM) 50 MG tablet; Take 1-2 tablets by mouth every 6 hours as needed for Pain for up to 90 days.   - XR SACROILIAC JOINTS (MIN 3 VIEWS); Future  -     Jake Garcia MD, Pain Management, [facility]    Chronic lymphocytic thyroiditis  -     levothyroxine (SYNTHROID) 75 MCG tablet; TAKE 1 TABLET BY MOUTH EVERY DAY  -     Good control at last check. -     Continue meds and lifestyle control. Psychophysiological insomnia  Continue zolpidem 10 mg nightly as needed. Talk with psychiatrist about trouble sleeping also. COVID-19 vaccine  Get the vaccine as soon as possible. If you had COVID-19 you should not need the vaccination for 90 days afterwards. This is because you already have antibodies to the virus. The first shot usually is only associated with soreness in the arm unless you have already had COVID-19. Then your reaction may be similar to getting the second shot as below. There is a risk of allergic reaction so everybody is supposed to stay at the vaccination site for 30 minutes after getting the vaccine. It would be a great idea to have Benadryl 25 g with you to take IF you have a milder allergic reaction after you leave the site. If you use an EpiPen take it with you. Using an EpiPen is not a contraindication to the vaccine only a precaution. Notify them that you use an EpiPen when they give you the shot. The second shot can be associated with flulike symptoms - but you CANNOT get COVID-19 from the vaccination. The flulike symptoms are your body's response to the RNA injected which has already produced antibodies after the first shot. It is better to have a few days of flulike symptoms than a few weeks on a ventilator. My personal preference is for [employer] or Moderna shots. But any port in the storm.

## 2021-03-22 ENCOUNTER — TELEPHONE (OUTPATIENT)
Dept: FAMILY MEDICINE CLINIC | Age: 41
End: 2021-03-22

## 2021-03-22 DIAGNOSIS — M53.3 COCCYX PAIN: Chronic | ICD-10-CM

## 2021-03-22 DIAGNOSIS — G89.29 CHRONIC BILATERAL LOW BACK PAIN WITHOUT SCIATICA: Primary | Chronic | ICD-10-CM

## 2021-03-22 DIAGNOSIS — M54.50 CHRONIC BILATERAL LOW BACK PAIN WITHOUT SCIATICA: Primary | Chronic | ICD-10-CM

## 2021-03-22 DIAGNOSIS — M53.3 CHRONIC SI JOINT PAIN: ICD-10-CM

## 2021-03-22 DIAGNOSIS — G89.29 CHRONIC SI JOINT PAIN: ICD-10-CM

## 2021-03-22 DIAGNOSIS — E06.3 CHRONIC LYMPHOCYTIC THYROIDITIS: Chronic | ICD-10-CM

## 2021-03-22 RX ORDER — TRAMADOL HYDROCHLORIDE 50 MG/1
50 TABLET ORAL 2 TIMES DAILY
Qty: 15 TABLET | Refills: 0 | Status: SHIPPED | OUTPATIENT
Start: 2021-03-22 | End: 2021-03-30

## 2021-03-22 RX ORDER — LEVOTHYROXINE SODIUM 0.07 MG/1
75 TABLET ORAL DAILY
Qty: 7 TABLET | Refills: 0 | Status: SHIPPED | OUTPATIENT
Start: 2021-03-22 | End: 2021-06-15 | Stop reason: SDUPTHER

## 2021-03-22 NOTE — TELEPHONE ENCOUNTER
Spoke with the patient. Explained that we will not call in an early refill. We will see if they will cover her with 15 tramadol 1 p.o. twice daily. Okayed tramadol 50 mg 1 p.o. twice daily #15 no refills. Also okayed levothyroxine 75 mg 1 p.o. daily #7 no refills. Chronic bilateral low back pain without sciatica  -     traMADol (ULTRAM) 50 MG tablet; Take 1 tablet by mouth 2 times daily for 8 days. Intended supply: 5 days. Take lowest dose possible to manage pain  Chronic SI joint pain  -     traMADol (ULTRAM) 50 MG tablet; Take 1 tablet by mouth 2 times daily for 8 days. Intended supply: 5 days. Take lowest dose possible to manage pain  Coccyx pain  -     traMADol (ULTRAM) 50 MG tablet; Take 1 tablet by mouth 2 times daily for 8 days. Intended supply: 5 days. Take lowest dose possible to manage pain  Chronic lymphocytic thyroiditis  -     levothyroxine (SYNTHROID) 75 MCG tablet;  Take 1 tablet by mouth daily

## 2021-03-22 NOTE — TELEPHONE ENCOUNTER
PT CALLED AGAIN AND I SPOKE WITH HER AND SHE IS STATING THAT SHE IS REALLY NEEDING THIS TO CALLED IN. SHE IS NOT DUE TO REFILL FOR 6 MORE DAYS PER PT. SHE STATED WITHOUT THE XANAX SHE WILL GO THROUGH [place] AND NEEDS THE TRAMADOL CALLED IN TOO. PER OAARS SHE WOULD BE 10 DAYS EARLY TO REFILL     SHE STATED SHE CALLED HER INSURANCE AND THE PHARMACY AND THEY SAID TO WOULD BE OKAY TO REILL EARLY. CALLED THE PHARMACY AND SPOKE WITH THE TECH AND SHE STATED THAT PT WAS JUST AT [address].  HS

## 2021-03-22 NOTE — TELEPHONE ENCOUNTER
She is asking for the Tramdol and Xanax - that is all she wants now she is in a lot of pain -   She is wanting to know if she should got to the hospital and spend $ 40.,000 because he won't call in her medication on a vacation over ride. Pt @  [phone].

## 2021-03-22 NOTE — TELEPHONE ENCOUNTER
SHE WILL BE BACK IN [place]OWN IN ABOUT 1 HOUR - SHE IS HAVING TROUBLE BREATHING AND WANTING TO KNOW WHEN DR. Renae Amato WILL BE ABLE TO ANSWER HER - OR IF ANYONE ELSE CAN HELP - NOW SHE IS HAVING TROUBLE BREATHING - SHOULD SHE GO TO [place] [address]?

## 2021-03-25 ENCOUNTER — TELEPHONE (OUTPATIENT)
Dept: FAMILY MEDICINE CLINIC | Age: 41
End: 2021-03-25

## 2021-03-25 DIAGNOSIS — F32.A DEPRESSION, UNSPECIFIED DEPRESSION TYPE: Chronic | ICD-10-CM

## 2021-03-25 DIAGNOSIS — F41.9 ANXIETY: Primary | Chronic | ICD-10-CM

## 2021-03-25 NOTE — TELEPHONE ENCOUNTER
----- Message from Elmer Martins. sent at 3/24/2021  1:10 PM EDT -----  Subject: Referral Request    QUESTIONS   Reason for referral request? Psychiatrist Dr. Álvaro Dutton   Has the physician seen you for this condition before? No   Preferred Specialist (if applicable)? Julien Asencio you already have an appointment scheduled? No  Additional Information for Provider? Please call when referral is   available   ---------------------------------------------------------------------------  --------------  CALL BACK INFO  What is the best way for the office to contact you? OK to leave message on   voicemail  Preferred Call Back Phone Number?  [phone]

## 2021-03-26 NOTE — TELEPHONE ENCOUNTER
Called pt and left a message seeing if she had a phone number or a fax number for this dr. Marianela Johnston

## 2021-03-29 ENCOUNTER — TELEPHONE (OUTPATIENT)
Dept: FAMILY MEDICINE CLINIC | Age: 41
End: 2021-03-29

## 2021-03-29 DIAGNOSIS — M53.3 CHRONIC SI JOINT PAIN: ICD-10-CM

## 2021-03-29 DIAGNOSIS — M62.838 MUSCLE SPASMS OF NECK: Chronic | ICD-10-CM

## 2021-03-29 DIAGNOSIS — M54.50 CHRONIC BILATERAL LOW BACK PAIN WITHOUT SCIATICA: Primary | Chronic | ICD-10-CM

## 2021-03-29 DIAGNOSIS — Q76.49 TRANSITIONAL VERTEBRAE: Chronic | ICD-10-CM

## 2021-03-29 DIAGNOSIS — G89.29 CHRONIC BILATERAL LOW BACK PAIN WITHOUT SCIATICA: Primary | Chronic | ICD-10-CM

## 2021-03-29 DIAGNOSIS — G89.29 CHRONIC SI JOINT PAIN: ICD-10-CM

## 2021-03-29 DIAGNOSIS — M53.3 COCCYX PAIN: Chronic | ICD-10-CM

## 2021-03-29 DIAGNOSIS — M62.830 MUSCLE SPASM OF BACK: Chronic | ICD-10-CM

## 2021-03-29 NOTE — TELEPHONE ENCOUNTER
received a call back from the director at [facility] and she informed me that dr. Lia Patterson is an in patient consult at [facility]. Called pt and left a message to give th office call back.  Hs

## 2021-03-29 NOTE — TELEPHONE ENCOUNTER
New referral ordered. Placed in fax bin for faxing. Call to confirm Dr. Claudean Mew office received the referral after it.

## 2021-03-29 NOTE — TELEPHONE ENCOUNTER
Pt returned my call. She was not aware that dr. Lia Patterson was in patient. She stated that she is fine seeing cortes again. Saw her in 2018 one time and never followed up because dr. Jian Morales continued to rx her medication. But now dr. Jian Morales thinks it best if she sees someone. Racquel Mckeon can she get schedule with elroy? Or is there someone closer to her that you know of that she can see?

## 2021-03-29 NOTE — TELEPHONE ENCOUNTER
Pt called stating that the pharmacy will not refill her medication. The tramadol and xanax. I told her I would look into this and give her a call back    I ran a [facility] refport. Tramadol fill for a 7 days suppy on 03/22/2021- good to refill on 03/29/2021  Xanax filled 03/01/2021 for a 30 days supply- to be filled 03/31/2021. Called the pharmacy to talk to them. Script that she lost of the tramadol is not due to be refilled till 03/31/2021. I told them I would call them back to let them know what dr. Rudi Washington said    Pt called back and I explained that I am working on this and trying to get if figured out she stated that when she spoke with dr. Rudi Washington he told her that she would be able to fill her medications on 03/29/2021. I told her that she would not be able to fill the xanax till 03/31/2021. And they only one he possibly would fill today would be the tramadol. She stated that she is going though with draw, having diarreha, vomiting, and anxiety and panic attacks.  I told her I would have to talk to dr. Rudi Washington and get an answer from him and that I would call her back as soon as I can.  hs

## 2021-03-30 ENCOUNTER — TELEPHONE (OUTPATIENT)
Dept: FAMILY MEDICINE CLINIC | Age: 41
End: 2021-03-30

## 2021-03-30 NOTE — TELEPHONE ENCOUNTER
Called Carly in [address] [facility]. Called and tramadol 50 mg #15 no refills and alprazolam 0.5 mg 1 twice daily #4 no refills. Called patient and informed her. She should have a left medication to last her to the 30-day leo. We may need to call the pharmacy tomorrow at [facility] to get straightened out her routine prescriptions.

## 2021-03-30 NOTE — TELEPHONE ENCOUNTER
I CALLED PT AND GAVE HER THE NUMBER TO MARY ANN, SHE STATED SHE TALKED TO DR. Karla Gutierrez LAST NIGHT AND HER MEDS HAD BEEN STOLEN OR LOST AT THE HOTEL. SHE WAS GIVEN TRAMADOL FOR A WEEK AND WAS TOLD SHE COULD FILL TRAMADOL AND XANAX ON THE 29TH. SHE STATED SHE IS DUE FOR A REFILL OF BOTH MEDS AT [facility]. I TOLD PT WE ARE GETTING READY TO LEAVE FOR THE NIGHT. AND I WILL GET THIS MESSAGE TO DR. Karla Gutierrez AND HAVE HIM ADDRESS THIS. Essence Booker

## 2021-03-30 NOTE — TELEPHONE ENCOUNTER
Patient is returning [facility] phone call, please give her a call back concerning pain mgmt.

## 2021-03-31 NOTE — PROGRESS NOTES
;     Follow-up : Patient presents today for a follow-up office visit from 02/16/2021. He completed the CT scan of the abdomen and pelvis with IV contrast on 02/18/2021 and the results are in EMR. Patient still has some concerns about the bulge in his abdomen. Patient thinks that the bulge has gone down some. He notices the bulge in the mornings, especially and after drinking a lot of water. This bulge is not painful. Patient wonders if the bulge is related to his diaphragm. Patient states that he is here today to go over the CT scan results and to discuss what this bulge might possibly be. Of note, patient has been doing portion control with his diet and has lost some weight. When he weighs himself at home he is just above 200 pounds.  ;    Physical Therapy  Women's Health - Pelvic Floor      Initial Assessment and Daily Treatment Note  Date: 2019  Patient Name: Stefan Daniel  MRN: [id number]  : 1980     Treatment Diagnosis: impaired flexiblity, and coordination of pelvic floor muscles; pain/spasm cycle of pelvic floor muscles    Restrictions   Expecting IVF at end of July, may need to modify treatments at that time    Subjective   General  Additional Pertinent Hx: PMH per patient's report: low back pain, SI/tailbone pain, depression, smoking history, vision/eye problems, stress fracture, neck pain, hypothyroid  Referring Practitioner: Dorina Calvo MD  Referral Date : 19  Diagnosis: sacroiliitis (M46.1)  Follows Commands: Within Functional Limits  PT Visit Information  Onset Date: 17  PT Insurance Information: [employer] - deductable met - allow 120 therapy visits/year  Total # of Visits Approved: 120  Total # of Visits to Date: 2(pt used 1 visit prior for PF PT assessment in [place])  Subjective  Subjective: Patient reports sacrum/tailbone and back/hip pain - \"tilted tailbone\" which began about 1.5 years ago. Believes it may have been related to a fall at work but symptoms did not start until about a year later. Reports pain 8/10 - constant aching when sitting and laying on back/hip and sometimes stabbing pain - has taking cortisone shots and pain meds. Xanax and Ambien help with pain. On a tight timeframe for internal pelvic floor physical therapt since she is hoping to complete IVF at the end of July. Difficulty sitting more that 30 seconds, walker greater that 10 minutes, standing greater than 30 minutes, performing light activity such as housework, lifting/bending, and with vigorous activity. Sometimes stretching and medications to help with spasms/pain. Patient rates the severity of her problem at \"8-20\"/10. Patient's goal: \"to correct tailbone in hope that is the cause. \"   Pain Screening  Patient Currently in Pain: and apparent spasms)  Quality of Contraction: good  Other: focus on downtraining and relaxation during manual techniques and stretching - deep breathing during transvaginal myofascial release in left pelvic floor muscles - tight muscle bands noted with reflexive spasms - with focus on deep breathing during manual techniques able to release some tension of pelvic floor muscles  Pelvic Floor External Observations  Voluntary Contraction: Present  Involuntary Contraction: Present  Involuntary relaxation: Absent  Perineal Descent: Rest: Absent  Perineal Descent Bearing Down: Present                   Assessment   Conditions Requiring Skilled Therapeutic Intervention  Body structures, Functions, Activity limitations: Decreased functional mobility ; Increased Pain(in regards to pelvic floor muscles and limited functional mobility due to pain)  Assessment: Patient presents to pelvic floor physical therapy with complaints of sacrum/tailbone and back/hip pain - \"tilted tailbone\" which began about 1.5 years ago. She believes it may have been related to a fall at work, but symptoms did not start until about a year later. Reports pain 8/10 - constant aching when sitting and laying on back/hip and sometimes stabbing pain - has taking cortisone shots and pain meds. Xanax and Ambien help with pain. However, she will not be able to rely on such medications and is on a tight timeframe for internal pelvic floor physical therapt since she is hoping to complete IVF at the end of July. She reports difficulty sitting more that 30 seconds, walker greater that 10 minutes, standing greater than 30 minutes, performing light activity such as housework, lifting/bending, and with vigorous activity. Sometimes stretching and medications to help with spasms/pain. Patient rates the severity of her problem at \"8-20\"/10. Patient's goal: \"to correct tailbone in hope that is the cause. \" PT used a pelvic floor model to orient the patient with her pelvic organ and pelvic floor muscles anatomy. Patient verbally consented to a transvaginal pelvic muscle floor muscle assessment which revealed severe tightness and tenderness to palpation in left pelvic floor muscles slight more compared to the right. Patient was instructed in diaphragmatic breathing techniques, stretches for her piriformis, and yoga for both relaxation of her pelvic floor muscles as well as provide a gentle stretch to her apparently tight pelvic floor muscles. Patient would benefit from pelvic floor physical therapy to learn stretching and relaxation/downtraining techniques for her pelvic floor and surrounding muscles to ultimately allow patient to have less pelvic floor pain, and be able to fully participate in her social activities, including being able to tolerate IVF planned at the end of July.    Treatment Diagnosis: impaired flexiblity, and coordination of pelvic floor muscles; pain/spasm cycle of pelvic floor muscles  Prognosis: Good  Decision Making: High Complexity  History: PMH per patient's report: low back pain, SI/tailbone pain, depression, smoking history, vision/eye problems, stress fracture, neck pain, hypothyroid  Exam: impaired flexiblity, and coordination of pelvic floor muscles; pain/spasm cycle of pelvic floor muscles  Clinical Presentation: evolving/worsening clinical symptoms which have been progressing over the last 1.5 years  Patient Education: prior to initiating formal PT eval discussed at length options for receiving pelvic floor therapy - this clinic is farther from her home that anticipated but patient could get in for an eval sooner than other placed called - after some discussion realized patient is seeking pelvic floor PT as soon as possible because she is planning IVF at the end of July and will need to control her pain without the muscle relaxers/medications that she has become used to taking - called [facility] about scheduling an eval or being able to  patient after initial eval was completed at [facility], which is a viable option -   pelvic floor model to orient patient to anatomy of pelvic floor muscles and pelvic organs, verbally consented to transvaginal pelvic floor muscle assessment - pain/spasm cycle and need to control, relationship of hip and back muscles to pelvic floor/tailbone pain,   Barriers to Learning: overwhelmed by circumstances and desparately seeking assistance to address her pelvic pain  REQUIRES PT FOLLOW UP: Yes  Treatment Initiated : 7/2/19 -  manual techniques for pelvic floor muscle tightness, relaxation/downtraining of pelvic floor muscles  Activity Tolerance  Activity Tolerance: Patient Tolerated treatment well         Plan   Plan  Specific instructions for Next Treatment: review progress with relaxation/downtraining activities, transvaginal and external manual techniques to assist with pain/spasm cycle in pelvic floor muscles  Current Treatment Recommendations: Neuromuscular Re-education, Manual Therapy - Soft Tissue Mobilization, Pain Management, Home Exercise Program, Patient/Caregiver Education & Training, Modalities, Positioning  Plan Comment: See patient every week initially to ensure patient is performing pelvic floor relaxation techniques correctly and to provide adequate manual therapy techniques to overcome the current pain/spasm cycle. Then taper, determining frequency of treatments based on progress, for a total for 8-10 sessions. Patient's ability to tolerate transvaginal myofascial release/stretch/massage will likely be limited at the end of July due to IVF, but will be able to continue with external manual techniques and stretches. Next scheduled appointment on Tuesday, 7/9/19, at [address] [facility].   Patient was given contact information for [facility], which is slightly closer to her home, and spoke with Edward Baez PT, who she would be seeing if she transferred to [facility].   However, at the end to the session, patient light activity such as housework, and progress to more vigourous activities such as bending/lifting and walking for exercise. Patient Goals   Patient goals : Patient's goal: \"to correct tailbone in hope that is the cause. \"        Therapy Time   Individual Concurrent Group Co-treatment   Time In 1100         Time Out 1230         Minutes 90         Timed Code Treatment Minutes: Rich, PT #[id number]

## 2021-04-01 ENCOUNTER — TELEPHONE (OUTPATIENT)
Dept: FAMILY MEDICINE CLINIC | Age: 41
End: 2021-04-01

## 2021-04-01 NOTE — TELEPHONE ENCOUNTER
PT CALLED TO SCHED AT [facility] BUT REFERRAL WAS DENIED THEY DID NOT REALLY GIVE HER A REASON AS TO WHY. I DO NOT SEE ANY NOTES. SHE IS GOING TO CALL HER INSURANCE AND FIND ONE, IS THERE ANOTHER ONE YOU RECOMMEND. Eric Shearer

## 2021-04-06 NOTE — TELEPHONE ENCOUNTER
Sent [employer] message to patient asking her if she would like a list emailed to her, or if she found someone through her insurance.  sc

## 2021-04-08 ENCOUNTER — TELEPHONE (OUTPATIENT)
Dept: FAMILY MEDICINE CLINIC | Age: 41
End: 2021-04-08

## 2021-04-08 NOTE — TELEPHONE ENCOUNTER
----- Message from Ursula Dunham sent at 4/7/2021  6:35 PM EDT -----  Subject: Message to Provider    QUESTIONS  Information for Provider? Pt is requesting a call back regarding the list   of pain management specialists that Remi Linares 89. M. ASilvia had come up with   that the pt's insurance covers. Pt would like a specialist that can also   write prescriptions if necessary as well as being local.   ---------------------------------------------------------------------------  --------------  CALL BACK INFO  What is the best way for the office to contact you? OK to leave message on   voicemail  Preferred Call Back Phone Number? [phone]  ---------------------------------------------------------------------------  --------------  SCRIPT ANSWERS  Relationship to Patient?  Self

## 2021-04-13 ENCOUNTER — APPOINTMENT (OUTPATIENT)
Dept: CT IMAGING | Age: 41
End: 2021-04-13
Payer: COMMERCIAL

## 2021-04-13 ENCOUNTER — HOSPITAL ENCOUNTER (EMERGENCY)
Age: 41
Discharge: HOME | End: 2021-04-13
Attending: EMERGENCY MEDICINE
Payer: COMMERCIAL

## 2021-04-13 VITALS
RESPIRATION RATE: 20 BRPM | BODY MASS INDEX: 22.04 KG/M2 | SYSTOLIC BLOOD PRESSURE: 102 MMHG | HEART RATE: 100 BPM | DIASTOLIC BLOOD PRESSURE: 51 MMHG | TEMPERATURE: 98 F | WEIGHT: 112.25 LBS | OXYGEN SATURATION: 99 % | HEIGHT: 60 IN

## 2021-04-13 DIAGNOSIS — M53.3 CHRONIC SI JOINT PAIN: ICD-10-CM

## 2021-04-13 DIAGNOSIS — G89.29 CHRONIC SI JOINT PAIN: ICD-10-CM

## 2021-04-13 DIAGNOSIS — F41.1 ANXIETY STATE: Primary | ICD-10-CM

## 2021-04-13 LAB
BACTERIA: ABNORMAL /HPF
BILIRUBIN URINE: ABNORMAL
BLOOD, URINE: ABNORMAL
CLARITY: ABNORMAL
COLOR: YELLOW
COMMENT UA: ABNORMAL
EPITHELIAL CELLS, UA: 3 /HPF (ref 0–5)
GLUCOSE URINE: NEGATIVE MG/DL
HCG(URINE) PREGNANCY TEST: NEGATIVE
KETONES, URINE: NEGATIVE MG/DL
LEUKOCYTE ESTERASE, URINE: NEGATIVE
MICROSCOPIC EXAMINATION: YES
NITRITE, URINE: NEGATIVE
PH UA: 5.5 (ref 5–8)
PROTEIN UA: ABNORMAL MG/DL
RBC UA: 5 /HPF (ref 0–4)
SPECIFIC GRAVITY UA: 1.02 (ref 1–1.03)
URINE REFLEX TO CULTURE: ABNORMAL
URINE TYPE: ABNORMAL
UROBILINOGEN, URINE: 0.2 E.U./DL
WBC UA: 5 /HPF (ref 0–5)

## 2021-04-13 PROCEDURE — 84703 CHORIONIC GONADOTROPIN ASSAY: CPT

## 2021-04-13 PROCEDURE — 6360000002 HC RX W HCPCS: Performed by: PHYSICIAN ASSISTANT

## 2021-04-13 PROCEDURE — 96372 THER/PROPH/DIAG INJ SC/IM: CPT

## 2021-04-13 PROCEDURE — 72192 CT PELVIS W/O DYE: CPT

## 2021-04-13 PROCEDURE — 72131 CT LUMBAR SPINE W/O DYE: CPT

## 2021-04-13 PROCEDURE — 3209999900 CT LUMBAR SPINE TRAUMA RECONSTRUCTION

## 2021-04-13 PROCEDURE — 81001 URINALYSIS AUTO W/SCOPE: CPT

## 2021-04-13 PROCEDURE — 99283 EMERGENCY DEPT VISIT LOW MDM: CPT

## 2021-04-13 RX ORDER — ZOLPIDEM TARTRATE 10 MG/1
TABLET ORAL
Qty: 30 TABLET | Status: CANCELLED | OUTPATIENT
Start: 2021-04-13

## 2021-04-13 RX ORDER — HYDROXYZINE PAMOATE 25 MG/1
25 CAPSULE ORAL 3 TIMES DAILY PRN
Qty: 20 CAPSULE | Refills: 0 | Status: SHIPPED | OUTPATIENT
Start: 2021-04-13 | End: 2021-04-27

## 2021-04-13 RX ORDER — CYCLOBENZAPRINE HCL 10 MG
10 TABLET ORAL 3 TIMES DAILY PRN
Qty: 21 TABLET | Refills: 0 | Status: SHIPPED | OUTPATIENT
Start: 2021-04-13 | End: 2021-04-23

## 2021-04-13 RX ORDER — HYDROXYZINE HYDROCHLORIDE 50 MG/ML
50 INJECTION, SOLUTION INTRAMUSCULAR ONCE
Status: COMPLETED | OUTPATIENT
Start: 2021-04-13 | End: 2021-04-13

## 2021-04-13 RX ADMIN — HYDROXYZINE HYDROCHLORIDE 50 MG: 50 INJECTION, SOLUTION INTRAMUSCULAR at 10:42

## 2021-04-13 ASSESSMENT — ENCOUNTER SYMPTOMS
ABDOMINAL PAIN: 0
BACK PAIN: 1
VOMITING: 0
RHINORRHEA: 0
NAUSEA: 0
SHORTNESS OF BREATH: 0
DIARRHEA: 0
COUGH: 0

## 2021-04-13 NOTE — ED PROVIDER NOTES
[address] [facility]        Pt Name: Leydi Navas  MRN: [id number]  [place] 1980  Date of evaluation: 4/13/2021  Provider: Fani Negron PA-C  PCP: Jaimee Paulino, DO     I have seen and evaluated this patient with my supervising physician No att. providers found. CHIEF COMPLAINT       Chief Complaint   Patient presents with    Anxiety     pt states she fell three years ago in [address] [place] [address]- states has been dealing with the pain since that time- states was told has fx in back- has been thru therapy and takes daily meds- states she thinks the meds \"takes my life\" also stating her anxiety is controlling her       HISTORY OF PRESENT ILLNESS   (Location, Timing/Onset, Context/Setting, Quality, Duration, Modifying Factors, Severity, Associated Signs and Symptoms)  Note limiting factors. Leydi Navas is a 41 y.o. female who presents to the emergency department today for evaluation for back pain, and anxiety. The patient has a history of chronic pain to her SI joint, and as well as a history of anxiety. The patient states that she had a work injury over 3 years ago, she states that she fell, she states that she landed on her back. The patient states that since that time she has had pain. The patient states that 2 years ago she obtained x-rays which did show a fracture to her sacral bone. The patient states that she has seen multiple specialist for this, she states that she has tried various massages, and injections but she continues to have pain. The patient is currently on Ultram, as well as Xanax. The patient states that she has been increasing her Xanax on her own because she states that it helps with her pain.   The patient states that her pain has not really changed in over the past 3 years, however now she states that she is getting ready to run out of her Xanax, and she is concerned that her Xanax may not be working for her and she states that she needs a refill for medication. The patient is tearful and hysterical on examination stating that she is worried that she is going to go through withdrawal, although she already took her Xanax this morning. The patient is noted to be ambulating throughout the emergency room without any difficulty. Patient states that she did fall several months ago and she would like repeat images today to make sure that there is nothing else going on. The patient denies any radiculopathy. She denies any bowel or bladder incontinence or retention. No saddle anesthesias. She has no recent injections in her back. She denies any IV drug use. Patient has no chest pain, shortness of breath or abdominal pain. No fevers. No nausea, no vomiting, no other complaints    Nursing Notes were all reviewed and agreed with or any disagreements were addressed in the HPI. REVIEW OF SYSTEMS    (2-9 systems for level 4, 10 or more for level 5)     Review of Systems   Constitutional: Negative for activity change, appetite change, chills and fever. HENT: Negative for congestion and rhinorrhea. Respiratory: Negative for cough and shortness of breath. Cardiovascular: Negative for chest pain. Gastrointestinal: Negative for abdominal pain, diarrhea, nausea and vomiting. Genitourinary: Negative for difficulty urinating, dysuria and hematuria. Musculoskeletal: Positive for back pain. Neurological: Negative for weakness and numbness. Psychiatric/Behavioral: The patient is nervous/anxious. Positives and Pertinent negatives as per HPI. Except as noted above in the ROS, all other systems were reviewed and negative. PAST MEDICAL HISTORY     Past Medical History:   Diagnosis Date    Cervical stenosis (uterine cervix) 1/1/2014    Chronic lymphocytic thyroiditis     DDD (degenerative disc disease), lumbar 04/10/2018    ?  L3-4     Depression     Drug overdose, intentional ([facility] [address] 11/11/2015    found in Care Everywhere    Fibroid uterus 11/01/2018    ON MRI no sx.  Genital herpes     GERD (gastroesophageal reflux disease)     Hypercholesterolemia 5/21/2013    Infertility     Vitamin D deficiency 1/7/2015         SURGICAL HISTORY     Past Surgical History:   Procedure Laterality Date    HYSTEROSCOPY Bilateral 2013    hystosalpingram    HYSTEROSCOPY N/A 02/15/2019    cervical stenosis & check for fibroids    LUMBAR SPINE SURGERY  2018    MIKO    LUMBAR SPINE SURGERY  04/19/2019    SI joint injections with fluroscopy         CURRENTMEDICATIONS       Discharge Medication List as of 4/13/2021  1:23 PM      CONTINUE these medications which have NOT CHANGED    Details   zolpidem (AMBIEN) 10 MG tablet TAKE 1 TABLET BY MOUTH AT BEDTIME AS NEEDED FOR SLEEPHistorical Med      ALPRAZolam (XANAX) 0.5 MG tablet TAKE 0.5-1 TABLET BY MOUTH AS NEEDED FOR SLEEP OR  ANXIETY EVERY 6 HRS FOR UP TO 2 PILLS/DAY, Disp-60 tablet, R-2Normal      traMADol (ULTRAM) 50 MG tablet Take 1-2 tablets by mouth every 6 hours as needed for Pain for up to 90 days. , Disp-210 tablet, R-2Normal      !! levothyroxine (SYNTHROID) 75 MCG tablet TAKE 1 TABLET BY MOUTH EVERY DAY, Disp-30 tablet, R-5DX Code Needed  . Normal      Cholecalciferol (VITAMIN D3) 50 MCG (2000 UT) CAPS Take 4,000 Units by mouth daily Indications: Vitamin D Deficiency Takes intermittentlyHistorical Med      docusate sodium (COLACE) 100 MG capsule Take 200 mg by mouthHistorical Med      fluticasone (FLONASE) 50 MCG/ACT nasal spray SHAKE LIQUID AND USE 1 SPRAY IN EACH NOSTRIL DAILY, Disp-16 g, R-3Normal      !! levothyroxine (SYNTHROID) 75 MCG tablet Take 1 tablet by mouth daily, Disp-7 tablet, R-0Normal       !! - Potential duplicate medications found. Please discuss with provider. ALLERGIES     Patient has no known allergies.     FAMILYHISTORY       Family History   Problem Relation Age of Onset    Thyroid Disease Mother         CLT    High Cholesterol Mother     Right Ear: External ear normal.      Left Ear: External ear normal.      Nose: Nose normal.   Eyes:      General:         Right eye: No discharge. Left eye: No discharge. Neck:      Musculoskeletal: Normal range of motion and neck supple. Trachea: No tracheal deviation. Cardiovascular:      Rate and Rhythm: Normal rate and regular rhythm. Pulses: Normal pulses. Heart sounds: No murmur. Pulmonary:      Effort: Pulmonary effort is normal. No respiratory distress. Breath sounds: Normal breath sounds. No wheezing or rales. Abdominal:      General: Bowel sounds are normal. There is no distension. Palpations: Abdomen is soft. Tenderness: There is no abdominal tenderness. There is no guarding. Musculoskeletal: Normal range of motion. Comments: There is tenderness to palpation noted to the paraspinous muscles the left lower lumbar spine, extending towards the sacral area. There is no midline tenderness. No bony step-offs or crepitus   Skin:     General: Skin is warm and dry. Neurological:      Mental Status: She is alert and oriented to person, place, and time. Comments: Motor strength of bilateral lower extremities is 5/5 throughout. Normal sensation light touch. Patellar reflexes and S1 reflexes are 2+ bilaterally. Posterior tibialis pulse and dorsalis pedis pulses are 2+ bilaterally. Negative straight leg raise. Patient is able to ambulate without difficulty. Normal dorsiflexion and plantar flexion. Full range of motion of hip, knee and ankle joints.      Psychiatric:         Behavior: Behavior normal.         DIAGNOSTIC RESULTS   LABS:    Labs Reviewed   URINE RT REFLEX TO CULTURE - Abnormal; Notable for the following components:       Result Value    Clarity, UA CLOUDY (*)     Bilirubin Urine SMALL (*)     Blood, Urine MODERATE (*)     Protein, UA TRACE (*)     All other components within normal limits    Narrative:     Performed at:  [facility]) - TECHNOLOGIST PROVIDED HISTORY: Reason for exam:->low back pain sacral pain Decision Support Exception->Emergency Medical Condition ([place]) Is the patient pregnant?->No Reason for Exam: Anxiety (pt states she fell three years ago in [address] [facility] has been dealing with the pain since that time- states was told has fx in back- has been thru therapy and takes daily meds- states she thinks the meds \"takes my life\" also stating her anxiety is controlling her) Acuity: Unknown Type of Exam: Unknown FINDINGS: BONES/ALIGNMENT: There is normal alignment of the spine. The vertebral body heights are maintained. No osseous destructive lesion is seen. DEGENERATIVE CHANGES: No significant degenerative changes of the lumbar spine. SOFT TISSUES/RETROPERITONEUM: No paraspinal mass is seen. Unremarkable non-contrast CT of the lumbar spine. Ct Pelvis Wo Contrast Additional Contrast? None    Result Date: 4/13/2021  EXAMINATION: CT OF THE PELVIS WITHOUT CONTRAST 4/13/2021 11:50 am TECHNIQUE: CT of the pelvis was performed without the administration of intravenous contrast.  Multiplanar reformatted images are provided for review. Dose modulation, iterative reconstruction, and/or weight based adjustment of the mA/kV was utilized to reduce the radiation dose to as low as reasonably achievable. COMPARISON: Sacrum and coccyx 10/29/2018. HISTORY: ORDERING SYSTEM PROVIDED HISTORY: sacral pain, TECHNOLOGIST PROVIDED HISTORY: Reason for exam:->sacral pain, Additional Contrast?->None Decision Support Exception->Emergency Medical Condition ([place]) Is the patient pregnant?->No Reason for Exam: Anxiety (pt states she fell three years ago in [address] [facility] has been dealing with the pain since that time- states was told has fx in back- has been thru therapy and takes daily meds- states she thinks the meds \"takes my life\" also stating her anxiety is controlling her) Acuity: Unknown Type of Exam: Unknown FINDINGS: Bones:  On the sagittal images, a smooth curvature of the sacrum and coccyx is seen similar to the prior images. No acute or remote healed fracture identified. No destructive bony lesion. No significant degenerative changes. Joints: Hip joints, sacroiliac joints, pubic symphysis and lower lumbar facet joints appear normal. Soft tissues: Uterus and urinary bladder appear normal.  Tampon noted in the vagina. 20 mm follicle in the left ovary with measured density 16 HU. Trace amount of free pelvic fluid. No acute GI abnormality. No acute bony or soft tissue abnormality identified in the pelvis. No evidence of acute or remote fracture. No degenerative changes. PROCEDURES   Unless otherwise noted below, none     Procedures    CRITICAL CARE TIME   N/A    CONSULTS:  None      EMERGENCY DEPARTMENT COURSE and DIFFERENTIAL DIAGNOSIS/MDM:   Vitals:    Vitals:    04/13/21 1011   BP: (!) 102/51   Pulse: 100   Resp: 20   Temp: 98 °F (36.7 °C)   TempSrc: Temporal   SpO2: 99%   Weight: 112 lb 4 oz (50.9 kg)   Height: 5' (1.524 m)       Patient was given the following medications:  Medications   hydrOXYzine (VISTARIL) injection 50 mg (50 mg Intramuscular Given 4/13/21 1042)           Recently, this is a 66-year-old female who presents emergency department today for evaluation for back pain, as well as anxiety. The patient states that she does have history of anxiety, and she is currently taking Xanax. The patient states that 3 years ago she injured herself at work, and she states that she has been experiencing pain to the sacral area since. Patient states that she is on all TRAM, she tells me today that she is in the emergency room because she wants to get to the bottom of what is going on. Again her pain is unchanged in 3 years. She tells me that she has been taking increasing Xanax at home as it helps with her spasms and she is concerned that she is getting ready to run out of these.     On physical examination the patient is very tearful, very anxious. She does have tenderness to palpation to the paraspinous muscles of the left lower lumbar spine, extending towards the sacral region. There are no focal neurological deficits    Urine shows no evidence of infection, pregnancy is negative. CTs are negative    Patient has remained crying and hysterical on examination she tells me that she is demanding that she have an MRI done so she can \"get to the bottom of this\". I did discuss with the patient that she has had pain for over 3 years, this seems to be more chronic in nature, and as there is no change in her pain over the past 3 years or no neurological deficits and MRI is not warranted at this time. The patient is now demanding that I refill her Xanax, as she is convinced that her primary care physician will not refill this for her. I did discuss that I would not be refilling this from the emergency room, particularly since I see that she had a 30-day supply filled on 3/31/2021. The patient is now crying stating \"sound is going to go through withdrawal\". She did tell me that she took her Xanax this morning, and I told her that she was always welcome to come back to the emergency room for any sorts of concerns of any withdrawal but she is not withdrawing at this time    The patient is now requesting that I refill her pain medication, and again I told her that I would not refill any controlled substances as I again see that her Ultram was refilled on 3/31/2021 and her OARRS report. Patient also had zolpidem filled 3 days ago as well. The patient does demonstrate multiple high risk medication usage, patient is becoming manipulative while in the emergency room and I am concerned for drug-seeking behavior at this time. I went in to discharge the patient, and she is irate that we are not performing an MRI, and that her medications will not be refilled.   I did discuss the case with her primary care physician, Dr. Srinath Sanders, her work his request, he otherwise has no recommendations, and is agreeable to plan to discharge her and refer her to the [address] [place] [address] clinic. The patient symptoms today are chronic at this time. I did tell her that I would give her a muscle relaxer and Vistaril for home. Otherwise recommended close follow-up with her primary care physician within 2 to 3 days per she is to return to the ED for any new or worsening symptoms. The patient is stable for discharge. My suspicions at this time for occult fracture, compression fracture, cauda equina syndrome, epidural abscess, discitis, epidural hematoma, or other emergent etiology      FINAL IMPRESSION      1. Anxiety state    2.  Chronic SI joint pain          DISPOSITION/PLAN   DISPOSITION Decision To Discharge 04/13/2021 01:13:56 PM      PATIENT REFERREDTO:  Sandra Chapin [address]  [phone]    Schedule an appointment as soon as possible for a visit in 2 days      [facility] Emergency Department  [address] [facility]  [phone]    As needed, If symptoms worsen    [facility]  Schedule an appointment as soon as possible for a visit in 1 week        DISCHARGE MEDICATIONS:  Discharge Medication List as of 4/13/2021  1:23 PM      START taking these medications    Details   hydrOXYzine (VISTARIL) 25 MG capsule Take 1 capsule by mouth 3 times daily as needed for Itching or Anxiety, Disp-20 capsule, R-0Normal      cyclobenzaprine (FLEXERIL) 10 MG tablet Take 1 tablet by mouth 3 times daily as needed for Muscle spasms, Disp-21 tablet, R-0Normal             DISCONTINUED MEDICATIONS:  Discharge Medication List as of 4/13/2021  1:23 PM                 (Please note that portions of this note were completed with a voice recognition program.  Efforts were made to edit the dictations but occasionally words are mis-transcribed.)    Keren Reina PA-C (electronically signed)            Keren Reina TAMARA  04/13/21 1604

## 2021-04-13 NOTE — ED NOTES
Pt states understanding of discharge instructions. Pt agrees to follow up with referral. Pt denies any further needs at this time. Pt ambulated to exit, denies need for wheelchair, gait steady, all pt belongings with pt.         Lolita Pérez RN  04/13/21 1456

## 2021-04-13 NOTE — ED NOTES
Pt medicated as ordered and updated on plan of care, v/u. Pt ambulated to Brayden Hyde RN  04/13/21 7408

## 2021-04-13 NOTE — ED PROVIDER NOTES
I independently performed a history and physical on Fer Shown. All diagnostic, treatment, and disposition decisions were made by myself in conjunction with the advanced practice provider. Briefly, this is a 41 y.o. female here for chroinc back pain and anxiety. She tells a long and convulted, often self contradictory story. No red flags for cauda equina/SEA. On exam, WDWN F, NAD, Heart RRR, Lungs CTAB, no r//rw. Normal strenght, sensaton, reflexes, gait. Screenings                   MDM  Reviewed workup; benign  No emergent medical condition present. DC home, f/u PCP. She had 30-day supplies of both xanax and tramadol filled 3/31. Spoke with PCP  [facility] for diversion. F/u [facility]. Patient Referrals:  Sandra Butler [address]  [phone]    Schedule an appointment as soon as possible for a visit in 2 days      [facility] Emergency Department  [address] [place] [address]  [phone]    As needed, If symptoms worsen      Discharge Medications:  New Prescriptions    CYCLOBENZAPRINE (FLEXERIL) 10 MG TABLET    Take 1 tablet by mouth 3 times daily as needed for Muscle spasms    HYDROXYZINE (VISTARIL) 25 MG CAPSULE    Take 1 capsule by mouth 3 times daily as needed for Itching or Anxiety       FINAL IMPRESSION  1. Anxiety state    2. Chronic SI joint pain        Blood pressure (!) 102/51, pulse 100, temperature 98 °F (36.7 °C), temperature source Temporal, resp. rate 20, height 5' (1.524 m), weight 112 lb 4 oz (50.9 kg), last menstrual period 04/04/2021, SpO2 99 %, not currently breastfeeding. For further details of [address] emergency department encounter, please see documentation by advanced practice provider, Caterina Virgen.        Ed Connor MD  04/13/21 6584

## 2021-04-14 RX ORDER — ZOLPIDEM TARTRATE 10 MG/1
TABLET ORAL
Qty: 30 TABLET | OUTPATIENT
Start: 2021-04-14

## 2021-04-14 NOTE — TELEPHONE ENCOUNTER
Left message: Not sure if this is her private line or not so will not leave a message. We will try to call back tomorrow.

## 2021-04-14 NOTE — TELEPHONE ENCOUNTER
Patient says she needs more Xanax because she has taken too many of them. Explained I will not refill those early. I will refill hydroxyzine if she needs it. Explained that patient cannot continue to call the office multiple times thinking That the more she asks a the more likely we will give it to her. Had to tell the patient we will not discussed this for half hour and hang up.

## 2021-04-19 ENCOUNTER — PATIENT MESSAGE (OUTPATIENT)
Dept: FAMILY MEDICINE CLINIC | Age: 41
End: 2021-04-19

## 2021-04-20 NOTE — TELEPHONE ENCOUNTER
From: Tobias Jurado  To: Lagro Donavanpillo   Sent: 4/19/2021 9:21 PM EDT  Subject: Non-Urgent Medical Question    Dr. Smiley Moarles,  Im really sorry to bother you . Due to my health issues, my  has had to take a few days off work. He applied for FMLA to cover these past few days so he is not terminated. He was told that the psychiatrist I saw this past week would complete this form for him. However , they said that it has to be competed by my primary Physician . The psychiatrist I saw was unable to service me due to virtual only which we did not know at the time we scheduled the appt. We have been working diligently to find the right psychiatrist to help me with my future needs . They are not booking until May. I do have a few appoints in place. Please let me know if this request can be done. We would greatly appreciate your help !  TY

## 2021-04-25 ENCOUNTER — PATIENT MESSAGE (OUTPATIENT)
Dept: FAMILY MEDICINE CLINIC | Age: 41
End: 2021-04-25

## 2021-04-26 NOTE — TELEPHONE ENCOUNTER
From: Baron Love  To: Syl Toney DO  Sent: 4/25/2021 4:04 PM EDT  Subject: Non-Urgent Medical Question    Dr Helena Jaeger,  [facility] Never, I made a mistake when scheduling for the [facility] paperwork appointment . I actually scheduled an appt. with Catherine about a month ago. I thought it was for he 28th but I just received the reminder and its on the 27th at 1:30pm. I am afraid that I will be late to my appt with you. I was wondering if it is anyway possible that we could make our appt a virtual? That way I can do the visit wherever I may be, at 3:30. If this is not possible I will reschedule the Catherine appt. The FMLA must be completed before the end of this month. So that will have to take priority. Also, Please send me an email to send the forms. Thank you.

## 2021-04-27 ENCOUNTER — OFFICE VISIT (OUTPATIENT)
Dept: FAMILY MEDICINE CLINIC | Age: 41
End: 2021-04-27
Payer: COMMERCIAL

## 2021-04-27 VITALS
DIASTOLIC BLOOD PRESSURE: 70 MMHG | HEART RATE: 74 BPM | HEIGHT: 60 IN | RESPIRATION RATE: 20 BRPM | BODY MASS INDEX: 22.19 KG/M2 | WEIGHT: 113 LBS | SYSTOLIC BLOOD PRESSURE: 110 MMHG

## 2021-04-27 DIAGNOSIS — M53.3 COCCYX PAIN: Primary | ICD-10-CM

## 2021-04-27 DIAGNOSIS — F41.9 ANXIETY: ICD-10-CM

## 2021-04-27 PROCEDURE — 99214 OFFICE O/P EST MOD 30 MIN: CPT | Performed by: FAMILY MEDICINE

## 2021-04-27 NOTE — PROGRESS NOTES
Kendal Staples (:  1980) is a 41 y.o. female,Established patient, here for evaluation of the following chief complaint(s): Other (DISCUSS FMLA PAPERWORK)      ASSESSMENT/PLAN:  Rocio Astudillo was seen today for other. Diagnoses and all orders for this visit:    Coccyx pain    Anxiety    FMLA paperwork completed for patient's spouse for this problem. Return in about 13 days (around 5/10/2021). SUBJECTIVE/OBJECTIVE:  Chief Complaint   Patient presents with    Other     DISCUSS FMLA PAPERWORK   348   HPI    Coccyx pain  Anxiety  Patient's past miss significant time at work because of problems above. Patient is requesting that we fill out FMLA forms for this purpose. Completed FMLA forms. Noted dates of service for patient with us neurology and orthopedic surgery and physical therapy. Patient is also been seen by the specialist.  Christiane Quiroz a start date of approximately 2018 for this problem. Patient's problems include chronic sacral pain and panic pelvic floor hypertonic dysfunction with pain and anxiety which she believes is to to the above problems. Review of Systems    Physical Exam  Constitutional:       Appearance: Normal appearance. She is well-developed, well-groomed and normal weight. She is not ill-appearing, toxic-appearing or diaphoretic. Neurological:      Mental Status: She is alert. Psychiatric:         Attention and Perception: Attention and perception normal.         Mood and Affect: Affect normal. Mood is anxious. Speech: Speech normal.         Behavior: Behavior normal. Behavior is cooperative.          Cognition and Memory: Cognition and memory normal.         Judgment: Judgment normal.       Vitals:    21 1531   BP: 110/70   Site: Left Upper Arm   Position: Sitting   Cuff Size: Small Adult   Pulse: 74   Resp: 20   Weight: 113 lb (51.3 kg)   Height: 5' (1.524 m)     BP Readings from Last 3 Encounters:   21 110/70   21 (!) 102/51   20 118/78 Pulse Readings from Last 3 Encounters:   04/27/21 74   04/13/21 100   11/24/20 70     Wt Readings from Last 3 Encounters:   04/27/21 113 lb (51.3 kg)   04/13/21 112 lb 4 oz (50.9 kg)   11/24/20 113 lb (51.3 kg)     Body mass index is 22.07 kg/m². On this date 4/27/2021 I have spent 34 minutes reviewing previous notes, test results and face to face with the patient discussing the diagnosis and importance of compliance with the treatment plan as well as documenting on the day of the visit. An electronic signature was used to authenticate this note.     --Madiha Mack, DO

## 2021-05-23 ENCOUNTER — PATIENT MESSAGE (OUTPATIENT)
Dept: FAMILY MEDICINE CLINIC | Age: 41
End: 2021-05-23

## 2021-05-24 NOTE — TELEPHONE ENCOUNTER
From: Karuna Darden  To: Verónica Adler DO  Sent: 5/23/2021 3:46 PM EDT  Subject: Non-Urgent Medical Question    Hi,  I AM SOOO SORRY. My life right low is a mess. I have dr appointment everywhere. Vet visits , visits forms alexis who can no longer drive. And then with my step dads medication Not working in his favor . I absolutely completely about the 21rst appt. can we please r reschedule Be for my meds are do at the end of the month? You can charge me for the missed visit at least half of it or something . Only day I can not due is the 24 I have another dr appointment for [facility] . Again, Im really sorry . Even if it has to be a virtual . Thank you so much .  Eve [place]

## 2021-05-28 ENCOUNTER — OFFICE VISIT (OUTPATIENT)
Dept: FAMILY MEDICINE CLINIC | Age: 41
End: 2021-05-28
Payer: COMMERCIAL

## 2021-05-28 VITALS
SYSTOLIC BLOOD PRESSURE: 110 MMHG | RESPIRATION RATE: 18 BRPM | HEART RATE: 80 BPM | HEIGHT: 60 IN | BODY MASS INDEX: 23.36 KG/M2 | DIASTOLIC BLOOD PRESSURE: 70 MMHG | WEIGHT: 119 LBS | OXYGEN SATURATION: 97 %

## 2021-05-28 DIAGNOSIS — G89.29 CHRONIC BILATERAL LOW BACK PAIN WITHOUT SCIATICA: ICD-10-CM

## 2021-05-28 DIAGNOSIS — F51.04 PSYCHOPHYSIOLOGICAL INSOMNIA: Primary | ICD-10-CM

## 2021-05-28 DIAGNOSIS — M53.3 CHRONIC SI JOINT PAIN: ICD-10-CM

## 2021-05-28 DIAGNOSIS — G89.29 CHRONIC SI JOINT PAIN: ICD-10-CM

## 2021-05-28 DIAGNOSIS — M53.3 COCCYX PAIN: ICD-10-CM

## 2021-05-28 DIAGNOSIS — M54.50 CHRONIC BILATERAL LOW BACK PAIN WITHOUT SCIATICA: ICD-10-CM

## 2021-05-28 PROCEDURE — 99214 OFFICE O/P EST MOD 30 MIN: CPT | Performed by: FAMILY MEDICINE

## 2021-05-28 RX ORDER — ALPRAZOLAM 1 MG/1
1 TABLET ORAL 3 TIMES DAILY
COMMUNITY
Start: 2021-05-12

## 2021-05-28 RX ORDER — DESVENLAFAXINE 50 MG/1
1 TABLET, EXTENDED RELEASE ORAL DAILY
COMMUNITY
Start: 2021-05-21 | End: 2021-05-28 | Stop reason: SDUPTHER

## 2021-05-28 RX ORDER — ZOLPIDEM TARTRATE 10 MG/1
TABLET ORAL
Qty: 30 TABLET | Refills: 0 | Status: SHIPPED | OUTPATIENT
Start: 2021-05-28 | End: 2021-06-28

## 2021-05-28 RX ORDER — TRAMADOL HYDROCHLORIDE 50 MG/1
50-100 TABLET ORAL EVERY 6 HOURS PRN
Qty: 210 TABLET | Refills: 2 | Status: SHIPPED | OUTPATIENT
Start: 2021-05-28 | End: 2021-08-31 | Stop reason: SDUPTHER

## 2021-05-28 RX ORDER — DULOXETIN HYDROCHLORIDE 60 MG/1
1 CAPSULE, DELAYED RELEASE ORAL DAILY
COMMUNITY
Start: 2021-05-12 | End: 2021-08-31 | Stop reason: SINTOL

## 2021-05-28 RX ORDER — QUETIAPINE FUMARATE 200 MG/1
1 TABLET, FILM COATED ORAL DAILY
COMMUNITY
Start: 2021-05-12 | End: 2021-08-31

## 2021-05-28 ASSESSMENT — PATIENT HEALTH QUESTIONNAIRE - PHQ9
SUM OF ALL RESPONSES TO PHQ QUESTIONS 1-9: 2
SUM OF ALL RESPONSES TO PHQ9 QUESTIONS 1 & 2: 2
2. FEELING DOWN, DEPRESSED OR HOPELESS: 1
SUM OF ALL RESPONSES TO PHQ QUESTIONS 1-9: 2
SUM OF ALL RESPONSES TO PHQ QUESTIONS 1-9: 2
1. LITTLE INTEREST OR PLEASURE IN DOING THINGS: 1

## 2021-05-28 NOTE — PROGRESS NOTES
Clayton Shepherd (:  1980) is a 41 y.o. female,Established patient, here for evaluation of the following chief complaint(s): Other (depression scrn due)       ASSESSMENT/PLAN:  12    Theron was seen today for other. Diagnoses and all orders for this visit:    Psychophysiological insomnia  -     zolpidem (AMBIEN) 10 MG tablet; TAKE 1 TABLET BY MOUTH AT BEDTIME AS NEEDED FOR SLEEP  Requested that the patient have her psychiatrist write for this medication. Chronic SI joint pain  -     traMADol (ULTRAM) 50 MG tablet; Take 1-2 tablets by mouth every 6 hours as needed for Pain for up to 90 days. Patient was instructed to see pain management. Her medications will be tapered if she fails to do so. Controlled Substance Monitoring:  Acute and Chronic Pain Monitoring:   RX Monitoring 2021   Attestation -   Periodic Controlled Substance Monitoring Possible medication side effects, risk of tolerance/dependence & alternative treatments discussed. ;No signs of potential drug abuse or diversion identified. ;Assessed functional status. ;Obtaining appropriate analgesic effect of treatment. Chronic Pain > 80 MEDD -   PDMP reviewed. Chronic bilateral low back pain without sciatica  -     traMADol (ULTRAM) 50 MG tablet; Take 1-2 tablets by mouth every 6 hours as needed for Pain for up to 90 days. Coccyx pain  -     traMADol (ULTRAM) 50 MG tablet; Take 1-2 tablets by mouth every 6 hours as needed for Pain for up to 90 days. Return in about 3 months (around 2021). Subjective   SUBJECTIVE/OBJECTIVE:  Chief Complaint   Patient presents with    Other     depression scrn due   [address]  [facility]    Psychiatrist  2021 sees again. He is prescribing her alprazolam and all her psychiatric medicines. He put her on Cymbalta but she could not tolerate. Discussed that Cymbalta helps both pain and anxiety and depression. It was a good choice for her. Pelvic floor disorder  Will restart the therapy.   She has now met her deductible. She had stopped previously because she could not afford it. It seems to really be helping her. She had a CT of her pelvis on 4/13/2021  Low back pain  She had a CT of her lumbar spine on 4/13/2021. She is still taking 2 tramadol 3 times a day. Informed her will not refill any more lost prescriptions. Insomnia  Takes zolpidem routinely at night. Asked that she find out if her psychiatrist would prescribe this. Since this is related to anxiety oftentimes a psychiatrist wants to be in control of this. Review of Systems       Objective   Physical Exam  Vitals and nursing note reviewed. Constitutional:       General: She is not in acute distress. Appearance: Normal appearance. She is well-groomed and normal weight. She is not ill-appearing, toxic-appearing or diaphoretic. Eyes:      General: Gaze aligned appropriately. No scleral icterus. Right eye: No discharge. Left eye: No discharge. Neck:      Trachea: Phonation normal.   Musculoskeletal:      Cervical back: Neck supple. Neurological:      Mental Status: She is alert. Cranial Nerves: No dysarthria or facial asymmetry. Coordination: Coordination normal.      Gait: Gait normal.   Psychiatric:         Attention and Perception: Attention and perception normal.         Mood and Affect: Affect normal. Mood is anxious. Mood is not depressed or elated. Affect is not labile, blunt, flat, angry, tearful or inappropriate. Speech: She is communicative. Speech is rapid and pressured. Speech is not delayed or slurred. Behavior: Behavior normal. Behavior is cooperative.          Cognition and Memory: Cognition and memory normal.         Judgment: Judgment normal.          Vitals:    05/28/21 1500   BP: 110/70   Site: Left Upper Arm   Position: Sitting   Cuff Size: Small Adult   Pulse: 80   Resp: 18   SpO2: 97%   Weight: 119 lb (54 kg)   Height: 5' (1.524 m)     BP Readings from Last 3 Encounters: 05/28/21 110/70   04/27/21 110/70   04/13/21 (!) 102/51     Pulse Readings from Last 3 Encounters:   05/28/21 80   04/27/21 74   04/13/21 100     Wt Readings from Last 3 Encounters:   05/28/21 119 lb (54 kg)   04/27/21 113 lb (51.3 kg)   04/13/21 112 lb 4 oz (50.9 kg)     Body mass index is 23.24 kg/m². CT OF THE PELVIS WITHOUT CONTRAST 4/13/2021 11:50 am  COMPARISON:  Sacrum and coccyx 10/29/2018.     HISTORY:  ORDERING SYSTEM PROVIDED HISTORY: sacral pain,  TECHNOLOGIST PROVIDED HISTORY:  Reason for exam:->sacral pain,  Additional Contrast?->None  Decision Support Exception->Emergency Medical Condition (MA)  Is the patient pregnant?->No  Reason for Exam: Anxiety (pt states she fell three years ago in [facility] [address]-  states has been dealing with the pain since that time- states was told has fx  in back- has been thru therapy and takes daily meds- states she thinks the  meds \"takes my life\" also stating her anxiety is controlling her)  Acuity: Unknown  Type of Exam: Unknown     FINDINGS:  Bones: On the sagittal images, a smooth curvature of the sacrum and coccyx is  seen similar to the prior images. No acute or remote healed fracture  identified. No destructive bony lesion. No significant degenerative changes.     Joints: Hip joints, sacroiliac joints, pubic symphysis and lower lumbar facet  joints appear normal.     Soft tissues: Uterus and urinary bladder appear normal.  Tampon noted in the  vagina. 20 mm follicle in the left ovary with measured density 16 HU. Trace  amount of free pelvic fluid. No acute GI abnormality.     IMPRESSION:  No acute bony or soft tissue abnormality identified in the pelvis. No  evidence of acute or remote fracture. No degenerative changes.     CT OF THE LUMBAR SPINE WITHOUT CONTRAST  4/13/2021  COMPARISON:  None     HISTORY:  ORDERING SYSTEM PROVIDED HISTORY: low back pain sacral pain  TECHNOLOGIST PROVIDED HISTORY:  Reason for exam:->low back pain sacral pain  Decision Support

## 2021-06-15 DIAGNOSIS — E06.3 CHRONIC LYMPHOCYTIC THYROIDITIS: Chronic | ICD-10-CM

## 2021-06-15 NOTE — TELEPHONE ENCOUNTER
Patient is requesting refill on   Alprazolam   And   Levothyroxine 75 mcg   Using the [facility] at [address] [place] [address] [address] [id number]    Asking if Adrienne Lam can call her , she has some questions about when these were ordered in the past.

## 2021-06-15 NOTE — TELEPHONE ENCOUNTER
CALLED AND SPOKE TO PATIENT SHE NEEDS MEDS SENT TO [facility]. PLEASE SEND THEN LET ME KNOW SO I CAN CALL HER BACK.  SC

## 2021-06-15 NOTE — TELEPHONE ENCOUNTER
LAST VISIT 05/28/2021 WITH DR Escobar Ruvalcaba, NEXT VISIT NONE.   [facility]       Component Value Ref Range & Units Status Collected Lab   TSH 1.03  0.27 - 4.20 uIU/mL Final 11/24/2020 10:19 AM  - [facility]

## 2021-06-16 DIAGNOSIS — E06.3 CHRONIC LYMPHOCYTIC THYROIDITIS: Chronic | ICD-10-CM

## 2021-06-16 RX ORDER — ALPRAZOLAM 1 MG/1
1 TABLET ORAL 3 TIMES DAILY PRN
OUTPATIENT
Start: 2021-06-16

## 2021-06-16 RX ORDER — LEVOTHYROXINE SODIUM 0.07 MG/1
75 TABLET ORAL DAILY
Qty: 30 TABLET | Refills: 5 | Status: SHIPPED | OUTPATIENT
Start: 2021-06-16 | End: 2021-11-23 | Stop reason: SDUPTHER

## 2021-06-16 RX ORDER — LEVOTHYROXINE SODIUM 0.07 MG/1
75 TABLET ORAL DAILY
Qty: 7 TABLET | OUTPATIENT
Start: 2021-06-16

## 2021-08-11 ENCOUNTER — TELEPHONE (OUTPATIENT)
Dept: FAMILY MEDICINE CLINIC | Age: 41
End: 2021-08-11

## 2021-08-11 DIAGNOSIS — F41.9 ANXIETY: Primary | ICD-10-CM

## 2021-08-11 DIAGNOSIS — M53.3 CHRONIC SI JOINT PAIN: ICD-10-CM

## 2021-08-11 DIAGNOSIS — M62.838 MUSCLE SPASMS OF NECK: ICD-10-CM

## 2021-08-11 DIAGNOSIS — G89.29 CHRONIC BILATERAL LOW BACK PAIN WITHOUT SCIATICA: ICD-10-CM

## 2021-08-11 DIAGNOSIS — Q76.49 TRANSITIONAL VERTEBRAE: ICD-10-CM

## 2021-08-11 DIAGNOSIS — Z51.81 MEDICATION MONITORING ENCOUNTER: ICD-10-CM

## 2021-08-11 DIAGNOSIS — E55.9 VITAMIN D DEFICIENCY: ICD-10-CM

## 2021-08-11 DIAGNOSIS — M54.50 CHRONIC BILATERAL LOW BACK PAIN WITHOUT SCIATICA: ICD-10-CM

## 2021-08-11 DIAGNOSIS — E78.00 HYPERCHOLESTEROLEMIA: ICD-10-CM

## 2021-08-11 DIAGNOSIS — M62.830 MUSCLE SPASM OF BACK: ICD-10-CM

## 2021-08-11 DIAGNOSIS — G89.29 CHRONIC SI JOINT PAIN: ICD-10-CM

## 2021-08-11 DIAGNOSIS — M53.3 COCCYX PAIN: ICD-10-CM

## 2021-08-11 DIAGNOSIS — G89.29 CHRONIC BILATERAL LOW BACK PAIN WITHOUT SCIATICA: Primary | ICD-10-CM

## 2021-08-11 DIAGNOSIS — F32.A DEPRESSION, UNSPECIFIED DEPRESSION TYPE: ICD-10-CM

## 2021-08-11 DIAGNOSIS — M54.50 CHRONIC BILATERAL LOW BACK PAIN WITHOUT SCIATICA: Primary | ICD-10-CM

## 2021-08-11 NOTE — TELEPHONE ENCOUNTER
Call and confirm whether patient wants to get the labs here or at the hospital prior to her visit or here during her visit.

## 2021-08-11 NOTE — TELEPHONE ENCOUNTER
----- Message from Dianne Ann sent at 8/11/2021  1:52 PM EDT -----  Subject: Message to Provider    QUESTIONS  Information for Provider? Patient needs the referral to be faxed to The   [facility]? Pain Management? fax number? [phone] with   demographics and reason for necessity of Pain management. Thank you.   ---------------------------------------------------------------------------  --------------  CALL BACK INFO  What is the best way for the office to contact you? OK to leave message on   voicemail  Preferred Call Back Phone Number? [phone]  ---------------------------------------------------------------------------  --------------  SCRIPT ANSWERS  Relationship to Patient?  Self

## 2021-08-11 NOTE — TELEPHONE ENCOUNTER
CALLED PT AND LEFT A DEATILED MESSAGE TO GIVE US A CALL BACK TO LET US KNOW WHERE SHE WANTS TO GO GET HER LABS DONE AT.  [facility]

## 2021-08-11 NOTE — TELEPHONE ENCOUNTER
----- Message from Lata Fournier sent at 8/11/2021  8:51 AM EDT -----  Subject: Referral Request    QUESTIONS   Reason for referral request? Full Panel Bloodwork for her thyroid. Is also   wanting to check for Cholesterol and kidneys, etc.   Has the physician seen you for this condition before? Yes  Select a date? 2021-05-28  Select the Provider the patient wants to be referred to, if known (PCP or   Specialist)? Gentry Molina   Preferred Specialist (if applicable)? Do you already have an appointment scheduled? Yes  Select Scheduled Date? 2021-08-31  Select Scheduled Physician? Gentry Molina   Additional Information for Provider?   ---------------------------------------------------------------------------  --------------  Boogie CARLISLE  What is the best way for the office to contact you? OK to leave message on   voicemail  Preferred Call Back Phone Number?  [phone]

## 2021-08-20 NOTE — PROGRESS NOTES
Last 3 Encounters:   10/11/18 64   08/15/18 77   07/31/18 64     Wt Readings from Last 3 Encounters:   11/09/18 101 lb (45.8 kg)   10/11/18 106 lb 9.6 oz (48.4 kg)   08/15/18 99 lb (44.9 kg)     Body mass index is 19.73 kg/m². 8/3/2018 09:30   Total CK 54   CRP 0.8   Cholesterol, Fasting 204 (H)   HDL Cholesterol 80 (H)   LDL Calculated 114 (H)   Triglyceride, Fasting 51   VLDL Cholesterol Calculated 10   Glucose, Fasting 109 (H)   TSH 2.20   Vit D, 25-Hydroxy 29.6 (L)   WBC 6.6   RBC 4.13   Hemoglobin Quant 13.0   Hematocrit 38.8   MCV 93.9   MCH 31.4   MCHC 33.4   MPV 8.6   RDW 12.5   Platelet Count 533   Neutrophils % 61.4   Lymphocyte % 26.8   Monocytes % 7.7   Eosinophils % 3.2   Basophils % 0.9   Neutrophils # 4.0   Lymphocytes # 1.8   Monocytes # 0.5   Eosinophils # 0.2   Basophils # 0.1   Sed Rate 9   ADRIEN Negative     Drug screen was done and is scanned in under media  Was positive for TCA/tricyclic antidepressant which is consistent with tramadol. It is negative for benzodiazepines. 3 XRAY VIEWS OF THE SACRUM/COCCYX  10/29/2018 3:09 pm  COMPARISON:  None. HISTORY:  ORDERING SYSTEM PROVIDED HISTORY: Chronic bilateral low back pain without  sciatica  TECHNOLOGIST PROVIDED HISTORY:  Reason for exam:->FALL WITH PAIN  Ordering Physician Provided Reason for Exam: PT c/o chronic tailbone pain  since Feb 2018, PT states she initially fell 2 years ago  Acuity: Unknown  Type of Exam: Unknown  FINDINGS:  The coccyx is in turning.  No acute fracture or subluxation is noted.  SI  joints are symmetrical.  Sacral ala are lines are intact.  However, there is  some irregularity in the S4 segment suggesting subacute healing fracture. Impression   In turning of the coccyx which is nonspecific.  This is sometimes related to  prior trauma such is childbirth.  No acute osseous abnormality.  However,  there is a nonacute finding in the S4 segment which may be related to  subacute or chronic S4 segment fracture.
no back pain, no gout, no musculoskeletal pain, no neck pain, and no weakness.

## 2021-08-31 ENCOUNTER — VIRTUAL VISIT (OUTPATIENT)
Dept: FAMILY MEDICINE CLINIC | Age: 41
End: 2021-08-31
Payer: COMMERCIAL

## 2021-08-31 ENCOUNTER — TELEPHONE (OUTPATIENT)
Dept: FAMILY MEDICINE CLINIC | Age: 41
End: 2021-08-31

## 2021-08-31 DIAGNOSIS — M54.50 CHRONIC BILATERAL LOW BACK PAIN WITHOUT SCIATICA: Primary | ICD-10-CM

## 2021-08-31 DIAGNOSIS — E06.3 CHRONIC LYMPHOCYTIC THYROIDITIS: ICD-10-CM

## 2021-08-31 DIAGNOSIS — M53.3 CHRONIC SI JOINT PAIN: ICD-10-CM

## 2021-08-31 DIAGNOSIS — F51.04 PSYCHOPHYSIOLOGICAL INSOMNIA: ICD-10-CM

## 2021-08-31 DIAGNOSIS — M53.3 COCCYX PAIN: ICD-10-CM

## 2021-08-31 DIAGNOSIS — G89.29 CHRONIC BILATERAL LOW BACK PAIN WITHOUT SCIATICA: Primary | ICD-10-CM

## 2021-08-31 DIAGNOSIS — G89.29 CHRONIC SI JOINT PAIN: ICD-10-CM

## 2021-08-31 DIAGNOSIS — M62.830 MUSCLE SPASM OF BACK: ICD-10-CM

## 2021-08-31 DIAGNOSIS — M62.838 MUSCLE SPASMS OF NECK: ICD-10-CM

## 2021-08-31 PROCEDURE — 99214 OFFICE O/P EST MOD 30 MIN: CPT | Performed by: FAMILY MEDICINE

## 2021-08-31 RX ORDER — ESCITALOPRAM OXALATE 10 MG/1
1 TABLET ORAL DAILY
COMMUNITY
Start: 2021-08-26

## 2021-08-31 RX ORDER — ZOLPIDEM TARTRATE 10 MG/1
1 TABLET ORAL NIGHTLY
COMMUNITY
Start: 2021-08-26

## 2021-08-31 RX ORDER — TRAMADOL HYDROCHLORIDE 50 MG/1
50-100 TABLET ORAL EVERY 6 HOURS PRN
Qty: 160 TABLET | Refills: 2 | Status: SHIPPED | OUTPATIENT
Start: 2021-08-31 | End: 2021-11-23 | Stop reason: SDUPTHER

## 2021-08-31 NOTE — TELEPHONE ENCOUNTER
DR. Lionel Nguyen ASKED THAT I CALL DR. Michael Henry OFFICE TO SEE IF THEY EVER RECEIVED THE REFERRAL FROM OUR OFFICE. I CALLED DR. Michael Henry OFFICE AND SPOKE TO ANMOL. SHE INFORMED ME THAT THEY DID RECEIVE THE REFERRAL AND THEY ALSO DENIED TAKING HER ON AS A PATIENT. THEY WERE NOT GOING TO PRESCRIBE HER OPIATES BECAUSE SHE ALSO TAKES BENZODIAZEPINES AND SHE RUNS THE RISK FOR DRUG OVERDOSE. SHE ALSO FAILED A DRUG SCREEN IN 2018 DUE TO HAVING OXYCODONE IN HER SYSTEM THAT WAS NOT PRESCRIBED TO HER. THEY HAVE BEEN TRYING TO REACH PATIENT SINCE 8/16/21 TO LET HER KNOW WHAT WAS GOING ON BUT NEVER DID GET A HOLD OF HER. I GAVE THIS INFORMATION TO DR. Lionel Nguyen AND HE SAID HE WOULD PRESCRIBE MEDICATION FOR HER, BUT WOULD CONTINUE TO WEAN HER DOWN UNTIL SHE CAN GET IN WITH PAIN MANAGEMENT. PATIENT HAD A VIRTUAL VISIT TODAY WITH DR. Lionel Nguyen AND DISCUSSED FURTHER OPTIONS FOR TREATMENT.  SC

## 2021-08-31 NOTE — TELEPHONE ENCOUNTER
----- Message from Woodrow Paul sent at 8/31/2021 12:00 PM EDT -----  Subject: Message to Provider    QUESTIONS  Information for Provider? Pt requesting a return call from Dr. Germán Hernandez nurse to confirm if he will be able to prescriber her pain   medication at todays VV as she is overdue. Pt had ofc appt today for her   thyroid, however was changed to [facility] due to flu like symptoms. PT does not   want to be billed for visit if PCP will be unable to prescribe her pain   meds. Pt was referred to pain clinic and says they have not rcvd referral   so has been unable to schedule.   ---------------------------------------------------------------------------  --------------  CALL BACK INFO  What is the best way for the office to contact you? OK to leave message on   voicemail  Preferred Call Back Phone Number? [phone]  ---------------------------------------------------------------------------  --------------  SCRIPT ANSWERS  Relationship to Patient?  Self

## 2021-08-31 NOTE — PROGRESS NOTES
Pan Galdamez (:  1980) is a 35 y.o. female,Established patient, here for evaluation of the following chief complaint(s): Other (pt c/o sinus congestion, sore throat, chills, body aches, unsure about fever, head congestion, runny or stuffy nose, sneezing x 4 days. pt has taking nyquil, advil, and nasonex with no real help from symptoms) and Lower Back Pain (pt c/o low back pain and wantsto discuss her tramadol)       ASSESSMENT/PLAN:  227      No follow-ups on file. Subjective   SUBJECTIVE/OBJECTIVE:  Chief Complaint   Patient presents with    Other     pt c/o sinus congestion, sore throat, chills, body aches, unsure about fever, head congestion, runny or stuffy nose, sneezing x 4 days. pt has taking nyquil, advil, and nasonex with no real help from symptoms    Lower Back Pain     pt c/o low back pain and wantsto discuss her tramadol   151  HPI    Chronic bilateral low back pain without sciatica  To be seen for spine and [place] [facility] in September. All the pain management sites have declined to see her. Dr Donte Ruff said they needed a referral.  They said she is not a pt she can see. She needs a refill on her pain medicine. She takes 1 to 2 pills every 6-8 hours for treatment of tramadol. For the pain she also walks on a treadmill. She does some stretching. She uses a heating pad. She is trying to take over-the-counter. Coccyx pain/ Chronic SI joint pain  PT does not help where the fracture is. It does help the muscles around the area and relaxes the muscles. Muscle spasm of back /Muscle spasms of neck  She gets Xanax from her psychiatrist.  This helps with muscle spasms. Chronic lymphocytic thyroiditis  Is overdue for TSH. Does not want to go to the hospital.   Psychophysiological insomnia  She is still taking her Ambien routinely. She gets that from her psychiatrist Orvis Journey. She is seeing him routinely. He also prescribes her Xanax.   She reports that her Xanax is   PELVIC PAIN  Sees pelvic therapist in September. Sore throat  With illness she has been taking NyQuil and Advil. She took NyQuil just before she called. Says symptom for 4 days and she still has a sense of smell. No coughing. Review of Systems   Past Medical History:   Diagnosis Date    Cervical stenosis (uterine cervix) 1/1/2014    Chronic lymphocytic thyroiditis     DDD (degenerative disc disease), lumbar 04/10/2018    ? L3-4     Depression     Drug overdose, intentional ([address] 11/11/2015    found in [address] Fibroid uterus 11/01/2018    ON MRI no sx.  Genital herpes     GERD (gastroesophageal reflux disease)     Hypercholesterolemia 5/21/2013    Infertility     Vitamin D deficiency 1/7/2015       Past Surgical History:   Procedure Laterality Date    FERTILITY SURGERY N/A 2019    punctured uterus when tried to dilate cervix    HYSTEROSCOPY Bilateral 2013    hystosalpingram    HYSTEROSCOPY N/A 02/15/2019    cervical stenosis & check for fibroids    LUMBAR SPINE SURGERY  2018    MIKO    LUMBAR SPINE SURGERY  04/19/2019    SI joint injections with fluroscopy       Social History     Socioeconomic History    Marital status:      Spouse name: Kate Mendez Number of children: 0    Years of education: 15    Highest education level: Not on file   Occupational History    Occupation: Sales  - sales     Comment: [id number] Occupation: Unemployed     Comment: 5/2019    Occupation: [employer]     Comment: trying to go back (winter)    Occupation: [employer]     Comment: Summer (2nd job) (stopped 10/2020) - will restart. Makes her own hours on average not sure how many hours per week.    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 Vaping Use    Vaping Use: Former    Substances: Always   Substance and Sexual Activity    Alcohol use: No     Comment: Quit drinking 2012. Was drinking 8-10 beers every other day for 2 months. Rarely drinks (holidays) then just a couple.  Drug use: No    Sexual activity: Yes     Partners: Male   Other Topics Concern    Not on file   Social History Narrative    No exercise. 17. 17. 17. Stretches only. 18. Daily activity. 18. Not working. Can't sit or stand for a long time. 18 Step dad had aneurism in brain. No exercise. 19. Had to stop Stretches. 19. In PT for pelvic floor. 19. New provider. 10/7/19. Walks on treadmill 4x/wk 20-25 min. 3/24/20. Now 30 min 4x/wk 1-2 mph. 20.20. Yoga and stretches. 20. And 1.2 mph 10-15 min 4x/wk. 21. Walks 5-10 min 1-2x 5x/wk. Steps 12 steps/day. Pelvic floor yield therapy . 21. She walks on the treadmill 1.5 mph at 10 minutes a day about 3-4 days a week. Limited hip pain. 2021     Social Determinants of Health     Financial Resource Strain:     Difficulty of Paying Living Expenses:    Food Insecurity:     Worried About Running Out of Food in the Last Year:     [address] in the Last Year:    Transportation Needs:     Lack of Transportation (Medical):      Lack of Transportation (Non-Medical):    Physical Activity:     Days of Exercise per Week:     Minutes of Exercise per Session:    Stress:     Feeling of Stress :    Social Connections:     Frequency of Communication with Friends and Family:     Frequency of Social Gatherings with Friends and Family:     Attends [employer] Services:     Active Member of Clubs or Organizations:     Attends Club or Organization Meetings:     Marital Status:    Intimate Partner Violence:     Fear of Current or Ex-Partner:     Emotionally Abused:     Physically Abused:     Sexually Abused:        Family History   Problem Relation Age of Onset    Thyroid Disease Mother         CLT    High Cholesterol Mother     Anxiety Disorder Mother     Alcohol Abuse Father     Other Father         GB surg. + Cologuard w/ colon     High Blood Pressure Brother     Other Brother         GERD    Kidney Disease Brother         CKD - cause    Osteoporosis Maternal Grandmother     COPD Maternal Grandmother     Diabetes Maternal Grandmother     High Blood Pressure Maternal Grandmother     High Cholesterol Maternal Grandmother     Cancer Maternal Grandmother         precancer skin lesions.  Kidney Cancer Maternal Grandmother         nephrectomy    High Blood Pressure Maternal Grandfather     Clotting Disorder Maternal Grandfather     No Known Problems Sister     Heart Failure Paternal Grandmother     Thyroid Disease Paternal Grandmother     Stroke Paternal Grandmother     Dementia Paternal Grandmother     Osteoporosis Maternal Aunt     Anxiety Disorder Maternal Aunt     Breast Cancer Maternal Aunt 79        Rare and progressive    No Known Problems Brother     No Known Problems Brother        No Known Allergies    Current Outpatient Medications   Medication Sig Dispense Refill    escitalopram (LEXAPRO) 10 MG tablet Take 1 tablet by mouth daily      zolpidem (AMBIEN) 10 MG tablet Take 1 tablet by mouth nightly.  levothyroxine (SYNTHROID) 75 MCG tablet Take 1 tablet by mouth daily 30 tablet 5    ALPRAZolam (XANAX) 1 MG tablet Take 1 tablet by mouth 3 times daily.       Cholecalciferol (VITAMIN D3) 50 MCG (2000 UT) CAPS Take 4,000 Units by mouth daily Indications: Vitamin D Deficiency Takes intermittently       docusate sodium (COLACE) 100 MG capsule Take 200 mg by mouth       fluticasone (FLONASE) 50 MCG/ACT nasal spray SHAKE LIQUID AND USE 1 SPRAY IN EACH NOSTRIL DAILY (Patient not taking: Reported on 8/31/2021) 16 g 3 No current facility-administered medications for this visit. Objective   Physical Exam  Vitals and nursing note reviewed. Constitutional:       General: She is not in acute distress. Appearance: Normal appearance. She is well-groomed and normal weight. She is not ill-appearing, toxic-appearing or diaphoretic. Comments: Patient laying down during the exam most of the time. Eyes:      General: No scleral icterus. Right eye: No discharge. Left eye: No discharge. Neck:      Trachea: Phonation normal.   Pulmonary:      Effort: No tachypnea, accessory muscle usage or respiratory distress. Comments: Not actively coughing. Musculoskeletal:      Cervical back: Neck supple. Neurological:      Mental Status: She is alert. Cranial Nerves: No dysarthria or facial asymmetry. Gait: Gait is intact. Comments: Patient did walk to the bathroom during the visit. She did not appear to be off balance. Psychiatric:         Attention and Perception: Attention and perception normal.         Mood and Affect: Mood is depressed. Affect is flat. Speech: She is communicative. Speech is delayed. Speech is not rapid and pressured, slurred or tangential.         Behavior: Behavior is slowed. Behavior is not agitated, aggressive, withdrawn, hyperactive or combative. Behavior is cooperative. Comments: She is unable to speak coherently at times. Says she is taking 400 vitamin D twice a day. Later reports that she is taking 4000 once a day as 2000 IU 2 pills daily intermittently.          Patient-Reported Vitals 8/31/2021   Patient-Reported Weight 120lbs   Patient-Reported Height 5'      BP Readings from Last 3 Encounters:   05/28/21 110/70   04/27/21 110/70   04/13/21 (!) 102/51     Pulse Readings from Last 3 Encounters:   05/28/21 80   04/27/21 74   04/13/21 100     Wt Readings from Last 3 Encounters:   05/28/21 119 lb (54 kg)   04/27/21 113 lb (51.3 kg)   04/13/21 112 lb 4 oz (50.9 kg)         Guero Pester, was evaluated through a synchronous (real-time) audio-video encounter. The patient (or guardian if applicable) is aware that this is a billable service. Verbal consent to proceed has been obtained within the past 12 months. The visit was conducted pursuant to the emergency declaration under the [address] [place] [address], [address] [place] [address] and the [employer] and [employer] General Act. Patient identification was verified, and a caregiver was present when appropriate. The patient was located in a state where the provider was credentialed to provide care. Total time spent for this encounter: 28     --Kristel Corcoran DO on 8/31/2021 at 1:56 PM    An electronic signature was used to authenticate this note. An electronic signature was used to authenticate this note.     --Kristel Corcoran DO

## 2021-09-11 NOTE — PATIENT INSTRUCTIONS
Rios Blanton was seen today for other and lower back pain. Diagnoses and all orders for this visit:    Chronic bilateral low back pain without sciatica  -     traMADol (ULTRAM) 50 MG tablet; Take 1-2 tablets by mouth every 6 hours as needed for Pain for up to 90 days. Continue home exercise/stretching plans given. A 1 minute stretch every 1-2 hours will help but a more comprehensive exercise/stretching program on days you are not working is even better. Moist heat to the area. Anti-inflammatory creams like diclofenac 1% gel. Even ordinary menthol sports creams can be helpful. A regular exercise program such as walking. Continue physical therapy. We will continue to taper pain medication until you transfer to a pain medicine specialist.     Chronic SI joint pain  -      traMADol (ULTRAM) 50 MG tablet; Take 1-2 tablets by mouth every 6 hours as needed for Pain for up to 90 days. Continue home exercise/stretching plans given. A 1 minute stretch every 1-2 hours will help but a more comprehensive exercise/stretching program on days you are not working is even better. Moist heat to the area. Anti-inflammatory creams like diclofenac 1% gel. Even ordinary menthol sports creams can be helpful. A regular exercise program such as walking. Continue physical therapy. We will continue to taper pain medication until you transfer to a pain medicine specialist.      Coccyx pain  -     traMADol (ULTRAM) 50 MG tablet; Take 1-2 tablets by mouth every 6 hours as needed for Pain for up to 90 days. Continue home exercise/stretching plans given. A 1 minute stretch every 1-2 hours will help but a more comprehensive exercise/stretching program on days you are not working is even better. Moist heat to the area. Anti-inflammatory creams like diclofenac 1% gel. Even ordinary menthol sports creams can be helpful. A regular exercise program such as walking. Continue physical therapy.   We spoke with the pain management office of Dr. Anthony Larose. They said that they are hesitant to prescribe opioids for people who are on benzodiazepines because of the risk of overdose. They also reported seeing in [facility] in [facility] that you had a positive drug screen for oxycodone when no one was prescribing it. We cannot force anyone to see you nor convince them to take you on as a patient if before you start seeing that office you have already broken the type of restrictions you will see on your drug contract with them. We will continue to taper pain medication until you transfer to a pain medicine specialist.      Muscle spasm of back  Continue home exercise/stretching plans given. Moist heat to the area. Anti-inflammatory creams like diclofenac 1% gel. Even ordinary menthol sports creams can be helpful. A regular exercise program such as walking. Xanax when used as a muscle relaxer will cause sedation and building up tolerance to Xanax. Muscle spasms of neck  Continue home exercise/stretching plans given. Moist heat to the area. Anti-inflammatory creams like diclofenac 1% gel. Even ordinary menthol sports creams can be helpful. A regular exercise program such as walking. Xanax when used as a muscle relaxer will cause sedation and building up tolerance to Xanax. Chronic lymphocytic thyroiditis  -     Good control at last check. Last thyroid function test was normal on 11/24/2020. There had been an abnormal level at the blood test just prior to that. This needs to be retested.     Psychophysiological insomnia  Medication prescription from the psychiatrist.

## 2021-11-23 ENCOUNTER — VIRTUAL VISIT (OUTPATIENT)
Dept: FAMILY MEDICINE CLINIC | Age: 41
End: 2021-11-23
Payer: COMMERCIAL

## 2021-11-23 DIAGNOSIS — G89.29 CHRONIC BILATERAL LOW BACK PAIN WITHOUT SCIATICA: ICD-10-CM

## 2021-11-23 DIAGNOSIS — G89.29 CHRONIC SI JOINT PAIN: ICD-10-CM

## 2021-11-23 DIAGNOSIS — M53.3 CHRONIC SI JOINT PAIN: ICD-10-CM

## 2021-11-23 DIAGNOSIS — M54.50 CHRONIC BILATERAL LOW BACK PAIN WITHOUT SCIATICA: ICD-10-CM

## 2021-11-23 DIAGNOSIS — M53.3 COCCYX PAIN: ICD-10-CM

## 2021-11-23 DIAGNOSIS — E06.3 CHRONIC LYMPHOCYTIC THYROIDITIS: Chronic | ICD-10-CM

## 2021-11-23 PROCEDURE — 99214 OFFICE O/P EST MOD 30 MIN: CPT | Performed by: NURSE PRACTITIONER

## 2021-11-23 RX ORDER — TRAMADOL HYDROCHLORIDE 50 MG/1
50-100 TABLET ORAL EVERY 6 HOURS PRN
Qty: 160 TABLET | Refills: 0 | Status: SHIPPED | OUTPATIENT
Start: 2021-11-23 | End: 2022-02-21

## 2021-11-23 RX ORDER — LEVOTHYROXINE SODIUM 0.07 MG/1
75 TABLET ORAL DAILY
Qty: 30 TABLET | Refills: 0 | Status: SHIPPED | OUTPATIENT
Start: 2021-11-23 | End: 2021-12-17

## 2021-11-23 SDOH — ECONOMIC STABILITY: FOOD INSECURITY: WITHIN THE PAST 12 MONTHS, THE FOOD YOU BOUGHT JUST DIDN'T LAST AND YOU DIDN'T HAVE MONEY TO GET MORE.: NEVER TRUE

## 2021-11-23 SDOH — ECONOMIC STABILITY: FOOD INSECURITY: WITHIN THE PAST 12 MONTHS, YOU WORRIED THAT YOUR FOOD WOULD RUN OUT BEFORE YOU GOT MONEY TO BUY MORE.: NEVER TRUE

## 2021-11-23 ASSESSMENT — SOCIAL DETERMINANTS OF HEALTH (SDOH): HOW HARD IS IT FOR YOU TO PAY FOR THE VERY BASICS LIKE FOOD, HOUSING, MEDICAL CARE, AND HEATING?: NOT HARD AT ALL

## 2021-11-23 NOTE — PROGRESS NOTES
Rey Panchal (:  1980) is a 35 y.o. female,Established patient, here for evaluation of the following chief complaint(s): Pain (ROUTINE PAIN VISIT, MEDICATION CHECK )      Patient identification was verified at the start of the visit: Yes    Patient located for today's visit in [place]: Yes-phone call    ASSESSMENT/PLAN:  1. Chronic bilateral low back pain without sciatica  -     traMADol (ULTRAM) 50 MG tablet; Take 1-2 tablets by mouth every 6 hours as needed for Pain for up to 90 days. NOT TO EXCEED 5 PILLS PER DAY, RARELY 6 PILLS PER DAY  TABS IS MOST SHE WILL BE ALLOTTED PER MONTH, Disp-160 tablet, R-0Normal  2. Chronic SI joint pain  -     traMADol (ULTRAM) 50 MG tablet; Take 1-2 tablets by mouth every 6 hours as needed for Pain for up to 90 days. NOT TO EXCEED 5 PILLS PER DAY, RARELY 6 PILLS PER DAY  TABS IS MOST SHE WILL BE ALLOTTED PER MONTH, Disp-160 tablet, R-0Normal  3. Coccyx pain  -     traMADol (ULTRAM) 50 MG tablet; Take 1-2 tablets by mouth every 6 hours as needed for Pain for up to 90 days. NOT TO EXCEED 5 PILLS PER DAY, RARELY 6 PILLS PER DAY  TABS IS MOST SHE WILL BE ALLOTTED PER MONTH, Disp-160 tablet, R-0Normal    I will not be filling more than 160 tabs. Needs to get in with pain management but clinics deny her. Needs to continue to follow with psych and not stop antidepressant to \"get more tramadol. \"  Pt mumbles and slow to speak on phone at 2:30 in the afternoon. Due for med contract and UDS. Keep December appt with Dr Christian Willson  Pt reports she knows why the pain clinics deny her and she got oxycontin for an infertility procedure and can prove this. 4. Chronic lymphocytic thyroiditis  -     levothyroxine (SYNTHROID) 75 MCG tablet; Take 1 tablet by mouth daily, Disp-30 tablet, R-0Normal   Sent only 30 days, due to TSH- check at December appt    Return in about 3 months (around 2022) for Pain Follow Up.     SUBJECTIVE/OBJECTIVE:  HPI     Pain medication  Patient speech is very slow on phone. Pt was placed on tramadol 5-6 years ago for pain. Several weeks later pt reported decrease in emotions and improved mood. Pt years ago was sent to see psychiatry for xanax management and says she tried numerous meds and eventually tried lexapro. When Dr Karen Lancaster after being placed on lexapro by psych, she reports Dr Karen Lancaster decreased her tramadol from 200 tabs to 160 tabs at a time. Pt would like to be increased again to 200 tabs tramadol. I discussed with patient she already has a high overdose risk score between the tramadol, xanax and ambien and I am not comfortable with increasing the tramadol to \"help her mood\" as that essentially sounds like an overdose of opioids because she reports it \"makes me feel leveled out and relaxed and even keel. \" Pt is going to tell her psychiatrist she does not want antidepressants because tramadol helps her mood. Advised pt not to do that as finding a medication for mood stabilizer is a better plan of care because tramadol should not be used for this purpose. Pt drowsy on the phone; read Dr Karen Lancaster previous notes that reported she is incoherent at times during conversations in office.      Review of Systems    Patient-Reported Vitals 8/31/2021   Patient-Reported Weight 120lbs   Patient-Reported Height 5'        Physical Exam    [INSTRUCTIONS:  \"[x]\" Indicates a positive item  \"[]\" Indicates a negative item  -- DELETE ALL ITEMS NOT EXAMINED]      Mental status: [] Alert and awake  [x] Oriented to person/place/time [x] Able to follow commands    [x] Abnormal - slow speech, mumbles             Psychiatric:       [x] Normal Affect [] Abnormal -        [x] No Hallucinations    Other pertinent observable physical exam findings:-          On this date 11/23/2021 I have spent 32 minutes reviewing previous notes, test results and face to face (virtual) with the patient discussing the diagnosis and importance of compliance with the treatment plan as well as documenting on the day of the visit. Care Gaps Addressed  COVID vaccine recommended  PAP smear scheduled Dec 2021  Flu vaccine recommended       I have reviewed patient's pertinent medical history, relevant laboratory and imaging studies, and past/future health maintenance. Discussed with the patient the importance of adhering to their current medication regimen as directed. Advised the patient that they should continue to work on eating a healthy balanced diet and staying active by exercising within their personal limits. Orders as listed above. Patient was advised to keep future appointments with their respective specialty care team(s). Patient had the opportunity to ask questions, all of which were answered to the best of my ability and with patient satisfaction. Patient understands and is agreeable with the care plan following today's visit. Patient is to schedule an appointment for any new or worsening symptoms. Go to ER for significant shortness of breath, chest pain, or uncontrolled pain or fever. I discussed with patient the risk and benefits of any medications that were prescribed today. I verified that the patient understands their medications, labs, and/or procedures. The patient is doing well with current medication regimen and does not have any barriers to adherence. The patient's self-management abilities are good. Follow Up in 3 Months for Pain Check In    Melecio Homans is a 35 y.o. female being evaluated by a Virtual Visit (video visit) encounter to address concerns as mentioned above. A caregiver was present when appropriate. Due to this being a TeleHealth encounter (During XRMAO-93 public health emergency), evaluation of the following organ systems was limited: Vitals/Constitutional/EENT/Resp/CV/GI//MS/Neuro/Skin/Heme-Lymph-Imm.   Pursuant to the emergency declaration under the [facility], 1135 waiver authority and the [employer] and Response Supplemental Appropriations Act, this Virtual Visit was conducted with patient's (and/or legal guardian's) consent, to reduce the patient's risk of exposure to COVID-19 and provide necessary medical care. The patient (and/or legal guardian) has also been advised to contact this office for worsening conditions or problems, and seek emergency medical treatment and/or call 911 if deemed necessary. Services were provided through a video synchronous discussion virtually to substitute for in-person clinic visit. Patient was located at home and provider was located in office or at home. An electronic signature was used to authenticate this note.     --Angela Coker, APRN - CNP

## 2021-11-23 NOTE — PATIENT INSTRUCTIONS
Patient Education        Chronic Pain: Care Instructions  Your Care Instructions     Chronic pain is pain that lasts a long time (months or even years) and may or may not have a clear cause. It is different from acute pain, which usually does have a clear causelike an injury or illnessand gets better over time. Chronic pain:  · Lasts over time but may vary from day to day. · Does not go away despite efforts to end it. · May disrupt your sleep and lead to fatigue. · May cause depression or anxiety. · May make your muscles tense, causing more pain. · Can disrupt your work, hobbies, home life, and relationships with friends and family. Chronic pain is a very real condition. It is not just in your head. Treatment can help and usually includes several methods used together, such as medicines, physical therapy, exercise, and other treatments. Learning how to relax and changing negative thought patterns can also help you cope. Chronic pain is complex. Taking an active role in your treatment will help you better manage your pain. Tell your doctor if you have trouble dealing with your pain. You may have to try several things before you find what works best for you. Follow-up care is a key part of your treatment and safety. Be sure to make and go to all appointments, and call your doctor if you are having problems. It's also a good idea to know your test results and keep a list of the medicines you take. How can you care for yourself at home? · Pace yourself. Break up large jobs into smaller tasks. Save harder tasks for days when you have less pain, or go back and forth between hard tasks and easier ones. Take rest breaks. · Relax, and reduce stress. Relaxation techniques such as deep breathing or meditation can help. · Keep moving. Gentle, daily exercise can help reduce pain over the long run. Try low- or no-impact exercises such as walking, swimming, and stationary biking.  Do stretches to stay

## 2021-12-17 DIAGNOSIS — E06.3 CHRONIC LYMPHOCYTIC THYROIDITIS: Chronic | ICD-10-CM

## 2021-12-17 RX ORDER — LEVOTHYROXINE SODIUM 0.07 MG/1
75 TABLET ORAL DAILY
Qty: 30 TABLET | Refills: 0 | Status: SHIPPED | OUTPATIENT
Start: 2021-12-17 | End: 2022-02-24

## 2021-12-20 ENCOUNTER — TELEPHONE (OUTPATIENT)
Dept: FAMILY MEDICINE CLINIC | Age: 41
End: 2021-12-20

## 2021-12-20 NOTE — TELEPHONE ENCOUNTER
CALLED AND SPOKE TO PATIENT. SHE STATES SHE IS IN NEED OF THE TRAMADOL. I ASKED HER IF SHE SAW THE PAIN MANAGEMENT DOCTORS AND SHE TOLD ME NO THAT SHE HAS BEEN DENIED FROM ANY PAIN MANAGEMENT DOCTOR RIGHT NOW AND CANNOT FIND ONE TO TAKE HER.  SC

## 2021-12-20 NOTE — TELEPHONE ENCOUNTER
Will not refill controlled substances if she has been terminated by the pain management physician as we explained to her before.

## 2021-12-20 NOTE — TELEPHONE ENCOUNTER
She is getting pain medications from the pain management doctor. She is getting psych medications are controlled substance from psychiatry. If all she needs is Lexapro and Synthroid dose two can be refilled for a month.

## 2021-12-20 NOTE — TELEPHONE ENCOUNTER
----- Message from Delphine  sent at 12/20/2021  8:49 AM EST -----  Subject: Message to Provider    QUESTIONS  Information for Provider? pt says that she has to cancel for the day due   to the death of her father in law pt says that she is due for a med refill   and wanted to know if she can get a refill to take her to her next appt.   ---------------------------------------------------------------------------  --------------  CALL BACK INFO  What is the best way for the office to contact you? OK to leave message on   voicemail  Preferred Call Back Phone Number? [phone]  ---------------------------------------------------------------------------  --------------  SCRIPT ANSWERS  Relationship to Patient?  Self

## 2021-12-22 ENCOUNTER — TELEPHONE (OUTPATIENT)
Dept: FAMILY MEDICINE CLINIC | Age: 41
End: 2021-12-22

## 2021-12-22 NOTE — TELEPHONE ENCOUNTER
DR. Brant Coto I ALREADY CALLED AND LEFT A DETAILED MESSAGE FOR PATIENT ADVISING HER THAT YOU WILL NOT REFILL CONTROLLED SUBSTANCES FOR THIS PATIENT. AND I WOULD HAVE TO SCHEDULE A FOLLOW UP APPOINTMENT WITH YOU PRIOR TO DOING ANYTHING, BECAUSE SHE STATES SHE NEEDS IT TO TAKE HER TO HER NEXT APPT BUT WE HAVE NO FOLLOW UP SCHEDULED. HOW DO I HANDLE THIS PATIENT?  SC

## 2021-12-22 NOTE — TELEPHONE ENCOUNTER
----- Message from Ken Vila sent at 12/21/2021  5:35 PM EST -----  Subject: Message to Provider    QUESTIONS  Information for Provider? pt returning missed call. please call back   immediately pt says that she has to cancel for the day due to the death of   her father in law pt says that she is due for a med refill and wanted to   know if she can get a refill to take her to her next appt.  ---------------------------------------------------------------------------  --------------  CALL BACK INFO  What is the best way for the office to contact you? OK to leave message on   voicemail  Preferred Call Back Phone Number?  [phone]  ---------------------------------------------------------------------------  --------------  SCRIPT ANSWERS  undefined

## 2021-12-22 NOTE — TELEPHONE ENCOUNTER
CALLED AND SPOKE TO PATIENT AND ADVISED ON DR. Jae Del Cid. SHE WANTS TO KNOW WHAT SHE IS SUPPOSED TO DO ABOUT HER MEDICATION UNTIL SHE CAN GET AN APPOINTMENT WITH PAIN MANAGEMENT? SHE STATES SHE HAS SENT IN MULTIPLE REFERRALS FOR PAIN MANAGEMENT.  SHE STATES TODAY IS THE LAST DAY FOR HER PILLS, SHE IS OUT AFTER THAT, WHAT IS SHE SUPPOSED TO DO? SC

## 2021-12-23 ENCOUNTER — TELEPHONE (OUTPATIENT)
Dept: FAMILY MEDICINE CLINIC | Age: 41
End: 2021-12-23

## 2021-12-23 DIAGNOSIS — M53.3 COCCYX PAIN: ICD-10-CM

## 2021-12-23 DIAGNOSIS — M54.50 CHRONIC BILATERAL LOW BACK PAIN WITHOUT SCIATICA: Primary | ICD-10-CM

## 2021-12-23 DIAGNOSIS — M53.3 CHRONIC SI JOINT PAIN: ICD-10-CM

## 2021-12-23 DIAGNOSIS — G89.29 CHRONIC BILATERAL LOW BACK PAIN WITHOUT SCIATICA: Primary | ICD-10-CM

## 2021-12-23 DIAGNOSIS — G89.29 CHRONIC SI JOINT PAIN: ICD-10-CM

## 2021-12-23 NOTE — TELEPHONE ENCOUNTER
Patient is returning Richelle's phone call, and it is very important she speaks with someone today. Concerning medication & referral. Please give her a call back.

## 2021-12-23 NOTE — TELEPHONE ENCOUNTER
Conversation: Controlled substances  (Oldest Message First)    Steven Kirk, DO  to Julia Day          12/22/21 6:12 PM  Allyson Rehman     We we agreed that you are going to get your controlled substances from pain management and psychiatry. You can not call the day before you run out and say we need you take care of this. You will need to go to the emergency room and get any medication that you need that is a controlled substance. Then we will discuss your coming back until you get into pain management again. I am not going to write these medications long-term. I do not believe they work long-term. People on these medications develop tolerance meaning they need more more of the medication to get the same effect. Do not try to call again to get these called in. This will result in termination from the practice. Thanks    This [employer] message has not been read. I CALLED PT BACK SHE HAD A DEATH IN THE FAMILY SO SHE MISSED HER DOCTORS APPOINTMENT WITH DR. Dallin Cash, STATED FATHER ELIN PASSED THAT MORNING. SHE SAID MONTHS AGO SHE TRIED TO GET INTO PAIN MANAGEMENT BUT WAS NOT ABLE TO GET IN TO ANY OF THE OFFICES SHE CALLED DUE TO WHAT DR. Murphy Cool 71? SHE ONLY NEEDS THE TRAMADOL REFILLED SHE IS NOT ASKING FOR Silas Proper. [facility]IN MANAGEMENT IS WHO SHE IS TRYING TO GET SCHEDULED WITH. [phone] STATES SHE CALLED THEM THIS MORNING. PT STATED SHE WAS NEVER SEEN BY THE PAIN MANAGEMENT SO SHE WAS NOT TERMED THEY JUST WOULD NOT SEE HER. I PENDED A REFERRAL TO THE PAIN MANAGEMENT OFFICE SHE STATED SHE CALLED THIS MORNING I WAS UNABLE TO CONFIRM IF SHE CALLED THEM OR NOT AS I SAT ON HOLD FOR 11 MINUTES AND NO ONE ANSWERED THE PHONE. PT ALSO TOLD ME DR. VACA WAS GOING TO HELP HER WITH HER PAIN MANAGEMENT REFERRAL. I DO NOT SEE WHERE THAT WAS DISCUSSED I COPIED THIS FROM DR. OLIVIER LAST NOTE. \"We spoke with the pain management office of Dr. Shara Virk.   They said that they are hesitant to prescribe opioids for people who are on benzodiazepines because of the risk of overdose. They also reported seeing in [facility] in [facility] that you had a positive drug screen for oxycodone when no one was prescribing it. We cannot force anyone to see you nor convince them to take you on as a patient if before you start seeing that office you have already broken the type of restrictions you will see on your drug contract with them. We will continue to taper pain medication until you transfer to a pain medicine specialist.\"       PLEASE ADVISE. Paula Holbrook

## 2022-02-24 DIAGNOSIS — E06.3 CHRONIC LYMPHOCYTIC THYROIDITIS: Chronic | ICD-10-CM

## 2022-02-24 RX ORDER — LEVOTHYROXINE SODIUM 0.07 MG/1
75 TABLET ORAL DAILY
Qty: 30 TABLET | Refills: 0 | Status: SHIPPED | OUTPATIENT
Start: 2022-02-24 | End: 2022-03-24

## 2022-03-24 DIAGNOSIS — E06.3 CHRONIC LYMPHOCYTIC THYROIDITIS: Chronic | ICD-10-CM

## 2022-03-24 RX ORDER — LEVOTHYROXINE SODIUM 0.07 MG/1
75 TABLET ORAL DAILY
Qty: 15 TABLET | Refills: 0 | Status: SHIPPED | OUTPATIENT
Start: 2022-03-24 | End: 2022-04-15 | Stop reason: SDUPTHER

## 2022-03-24 NOTE — TELEPHONE ENCOUNTER
Call patient to schedule visit with thyroid testing (in person) for future full refills on her levothyroxine.

## 2022-04-15 DIAGNOSIS — E06.3 CHRONIC LYMPHOCYTIC THYROIDITIS: Chronic | ICD-10-CM

## 2022-04-15 RX ORDER — LEVOTHYROXINE SODIUM 0.07 MG/1
75 TABLET ORAL DAILY
Qty: 30 TABLET | OUTPATIENT
Start: 2022-04-15

## 2022-04-15 RX ORDER — LEVOTHYROXINE SODIUM 0.07 MG/1
75 TABLET ORAL DAILY
Qty: 15 TABLET | Refills: 0 | Status: SHIPPED | OUTPATIENT
Start: 2022-04-15 | End: 2022-05-12 | Stop reason: SDUPTHER

## 2022-04-15 NOTE — TELEPHONE ENCOUNTER
She has an appt on 04/18/22    But she is out - please call in at least enough to last til Monday    Can someone else take care of this?

## 2022-05-12 DIAGNOSIS — E06.3 CHRONIC LYMPHOCYTIC THYROIDITIS: Chronic | ICD-10-CM

## 2022-05-12 RX ORDER — LEVOTHYROXINE SODIUM 0.07 MG/1
75 TABLET ORAL DAILY
Qty: 15 TABLET | Refills: 0 | Status: SHIPPED | OUTPATIENT
Start: 2022-05-12 | End: 2022-06-20

## 2022-05-12 NOTE — TELEPHONE ENCOUNTER
----- Message from Stefani Valente sent at 5/11/2022  3:49 PM EDT -----  Subject: Refill Request    QUESTIONS  Name of Medication? levothyroxine (SYNTHROID) 75 MCG tablet  Patient-reported dosage and instructions? take 1 time a day   How many days do you have left? 0  [id number]  Pharmacy phone number (if available)? [phone]  Additional Information for Provider? Patient has appt on 5/23 and is   needing this medication until she can come in on that day .   ---------------------------------------------------------------------------  --------------  [address]  What is the best way for the office to contact you? OK to leave message on   voicemail  Preferred Call Back Phone Number? [phone]  ---------------------------------------------------------------------------  --------------  SCRIPT ANSWERS  Relationship to Patient?  Self

## 2022-06-20 DIAGNOSIS — E06.3 CHRONIC LYMPHOCYTIC THYROIDITIS: Chronic | ICD-10-CM

## 2022-06-20 RX ORDER — LEVOTHYROXINE SODIUM 0.07 MG/1
75 TABLET ORAL DAILY
Qty: 30 TABLET | OUTPATIENT
Start: 2022-06-20

## 2022-06-20 RX ORDER — LEVOTHYROXINE SODIUM 0.07 MG/1
75 TABLET ORAL DAILY
Qty: 30 TABLET | Refills: 0 | Status: SHIPPED | OUTPATIENT
Start: 2022-06-20

## 2022-06-20 NOTE — TELEPHONE ENCOUNTER
LAST VISIT 11/23/2021 WITH MIGUEL DALEY, PT HAS HAD MULTIPLE ACUTE VISITS BUT NO ROUTINE VISITS IN THE PAST YEAR, NEXT VISIT 06/23/2022.  [address]     11/24/2020 10:12 PM - Nathan Morataya Incoming Lab Results From Soft (Epic Adt)    Component Value Flag Ref Range Units Status   TSH 1.03   0.27 - 4.20 uIU/mL Final       Narrative    Performed at:   [facility]   [address]   Phone [phone]

## 2022-06-21 NOTE — TELEPHONE ENCOUNTER
6/23/2022  1:30 PM [address]   Appointment Notes:    Appt Reason: Routine Physical Examroutine physical

## 2022-07-12 ENCOUNTER — TELEPHONE (OUTPATIENT)
Dept: PRIMARY CARE CLINIC | Age: 42
End: 2022-07-12

## 2022-07-12 NOTE — TELEPHONE ENCOUNTER
This is patient 2 nd no show with dr Caleb Pal   Pt is rescheduled with Dr Jameel Goyal   Please review

## 2022-07-25 ENCOUNTER — TELEPHONE (OUTPATIENT)
Dept: PRIMARY CARE CLINIC | Age: 42
End: 2022-07-25

## 2022-08-26 ENCOUNTER — TELEPHONE (OUTPATIENT)
Dept: FAMILY MEDICINE CLINIC | Age: 42
End: 2022-08-26

## 2022-09-19 ENCOUNTER — TELEPHONE (OUTPATIENT)
Dept: FAMILY MEDICINE CLINIC | Age: 42
End: 2022-09-19

## 2022-09-19 NOTE — TELEPHONE ENCOUNTER
SPOKE TO YUE  AND PATIENT WAS DISMISSED FROM PRACTICE IN June 2022, SHE IS PAST HER 30 DAY EMERGENCY CARE, WE ARE NOT OBLIGATED TO CALL PATIENT BACK AT THIS TIME.  SC

## 2022-09-19 NOTE — TELEPHONE ENCOUNTER
Pt is calling because she is having a hard time finding a new provider. She is saying she canceled a lot of appt because she did not need to be here except every 6 months for her thyroid. We forced her to see a psychiatrist so the appt were rescheduled. Pt does not want to come back here but needs her thyroid medication she has been out for a month. She is having a lot of symptoms and was told to go to the ER for these but she does not want to have a large bill. .        We dismissed  pt in June.

## 2022-09-19 NOTE — TELEPHONE ENCOUNTER
Last visit here was 12/20/2021. There were 10 no-shows and cancels over 6 months. Epic shows continued no-shows and cancels for new providers. This is the reason she is having trouble finding a new provider. She had a 20% no-show rate meaning those do not include cancellations just not showing up for an appointment and not calling. We will not resume patient care as we were unable to get patient to come in so we could care for patient.

## 2022-09-20 DIAGNOSIS — E06.3 CHRONIC LYMPHOCYTIC THYROIDITIS: Chronic | ICD-10-CM

## 2022-09-20 DIAGNOSIS — J30.1 SEASONAL ALLERGIC RHINITIS DUE TO POLLEN: ICD-10-CM

## 2022-09-20 RX ORDER — LEVOTHYROXINE SODIUM 0.07 MG/1
75 TABLET ORAL DAILY
Qty: 30 TABLET | Refills: 0 | OUTPATIENT
Start: 2022-09-20

## 2022-09-20 RX ORDER — FLUTICASONE PROPIONATE 50 MCG
SPRAY, SUSPENSION (ML) NASAL
Qty: 16 G | Refills: 3 | OUTPATIENT
Start: 2022-09-20

## 2022-09-20 NOTE — TELEPHONE ENCOUNTER
SPOKE TO KB, PER [facility] PATIENT HAS NO SHOWED AND BEEN DISMISSED FROM [place] AND [place] [facility] FOR NO SHOWS.  SC

## 2022-09-20 NOTE — TELEPHONE ENCOUNTER
Pt called back today. She said there are false things in her chart. Her neighbor had the letter , she received it later. The neighbor signed for it. She wants medication until the Oct. 18, that's when she has a new appt . I tied telling pt yesterday and today that we only will see pt for 30 days. Today she is complaining about Dr.    She said if no one calls her back (Dr [place])  then she will continue to call here everyday. She is talking about taking oxycontin.

## 2023-03-24 NOTE — TELEPHONE ENCOUNTER
[address] [id number]  Hematology Ambulatory Consult  Ziggy Dominguez, 1988, [id number]  3/24/2023    Assessment/Plan:  1  Normocytic anemia  2  Iron deficiency anemia following bariatric surgery  3  B12 deficiency  4  History of Jaycee-en-Y gastric bypass  5  Postsurgical malabsorption  Ms Ines Wadsworth is a 57-year-old female with longstanding history of iron deficiency anemia  She has a history of Jaycee-en-Y bariatric surgery in 2016 and has been having issues with iron and B12 deficiency since  She was previously seen by our practice in 2020 and was placed on IV Venofer  Unfortunately she had an allergic reaction during her second infusion and it was discontinued  Our office was attempting to obtain insurance authorization for IV Feraheme but unfortunately the patient was lost to follow-up  She is seen today with a hemoglobin of 9 5, ferritin 5, iron saturation 8% and serum iron of 30  She is symptomatic with iron deficiency including ice cravings, restless legs, fatigue, lightheadedness, dizziness, and headaches  Due to her surgical history and postsurgical malabsorption IV iron is recommended  Because of her previous reaction and allergy to IV Venofer I am ordering IV Feraheme x2 doses to be given over 90 minutes  I have also added premedications including Decadron 8 mg IV, Benadryl 25 mg IV, and Pepcid 20 mg IV to be given prior to the infusion  The patient understands that these are similar medications and there is a chance of allergic reaction as well but there is not many alternatives as she does not absorb oral iron  The patient is willing to attempt this infusion  She will continue on sublingual B12 supplementation as ordered by her other provider  She will repeat full set of labs in 2 months to ensure she has been adequately supplemented    I did explain that she could transition to IM Insurance will not cover an MRI until after the patient has had physical therapy for at least 2 weeks. An EMG will add more evidence to the need for an MRI but the insurance is still likely to require 2 weeks of physical therapy prior to the MRI. Alternatively we can refer her to a back doctor ie Dr Angelika Albright MD orthopedics/spinal surgery. There is also another doctor who does physical medicine and rehabilitation who tries nonsurgical treatments. In order to continue receiving controlled substances she will need to follow through with a treatment plan. B12 injections if still not adequately supplemented at that time  She will wait until repeat labs  - Ambulatory referral to Hematology / Oncology  - CBC and differential; Future  - Comprehensive metabolic panel; Future  - Folate; Future  - Vitamin B12; Future  - Methylmalonic acid, serum; Future  - Iron Panel (Includes Ferritin, Iron Sat%, Iron, and TIBC); Future      The patient is scheduled for follow-up in approximately 2 months  Patient voiced agreement and understanding to the above  Patient knows to call the Hematology/Oncology office with any questions and concerns regarding the above  Barrier(s) to care: None  The patient is able to self care     -------------------------------------------------------------------------------------------------------    Chief Complaint   Patient presents with   • Follow-up       Referring provider:  EN Dai  [address] [place] [address]    History of present illness: Saintclair Mazzoni is a 68-year-old female with no significant past medical history seen in consultation for iron deficiency anemia  She has a longstanding history of iron and B12 deficiency anemia previously seen in our practice in 2020  At that time she was prescribed IV Venofer 200 mg and received 1 dose on 7/7/2020 and during her second dose on 7/9/2020 it was discontinued due to reaction  She had a reaction including chest pressure and blurred vision and therefore Venofer has been marked as an allergy  She was recommended a trial of IV Feraheme but was lost to follow-up  She recently had labs completed which demonstrate persistent iron and B12 deficiency with a ferritin of 5, B12 203 with a hemoglobin of 9 5  She was started on sublingual B12 and tolerating this well  She does not respond to oral iron due to postsurgical malabsorption  She had Jaycee-en-Y gastric bypass surgery in 2016 with revision in 2020    She denies ever being told that she or a family member is diagnosed with thalassemia  She has not had a colonoscopy yet due to age  She denies coffee ground stools, tarry stools, bright red blood per rectum, hematuria, or menorrhagia  Her menstrual cycle is regular every month only lasting 2 days and not heavy  She denies fevers, chills, unintentional weight loss, abdominal pain/distension, nausea, vomiting, constipation, petechiae/purpura, unexplained ecchymosis, or LE swelling  Her current symptoms include ice cravings, restless legs, brittle nails, thinning hair, fatigue, lightheadedness, dizziness, headaches, dyspnea on exertion, heart palpitation, diarrhea  11/12/2018: Hemoglobin 10 9, MCV 85, platelets 360, WBC 5 5  6/12/2020: Hemoglobin 7 9, MCV 78, platelets 640, WBC 5 66   Ferritin 4, iron saturation 4%, TIBC 522, serum iron 20  5/12/2021: Hemoglobin 11 6, MCV 92, platelets 009, WBC 9 25  3/1/2023: Hemoglobin 9 5, MCV 85, platelets 535, WBC 7 45   Ferritin 5, iron saturation 8%, TIBC 456, serum iron 30    Review of Systems   Constitutional: Positive for fatigue  Negative for activity change, appetite change, diaphoresis, fever and unexpected weight change  Ice craving, restless legs, brittle nails, thinning hair   HENT: Negative for trouble swallowing and voice change  Eyes: Negative for photophobia and visual disturbance  Respiratory: Negative for cough, chest tightness and shortness of breath  Dyspnea on exertion   Cardiovascular: Positive for palpitations  Negative for chest pain and leg swelling  Gastrointestinal: Positive for diarrhea  Negative for abdominal distention, abdominal pain, anal bleeding, blood in stool, constipation, nausea and vomiting  Endocrine: Negative for cold intolerance and heat intolerance  Genitourinary: Negative for dysuria, hematuria and urgency  Musculoskeletal: Negative for arthralgias, back pain, gait problem, joint swelling and myalgias     Skin: Negative for pallor and rash  Neurological: Positive for dizziness, light-headedness and headaches  Negative for weakness  Hematological: Negative for adenopathy  Does not bruise/bleed easily  Psychiatric/Behavioral: Negative for sleep disturbance         Patient Active Problem List   Diagnosis   • Acute duodenal ulcer   • Anemia   • Uncontrolled hypertension   • Hyperinsulinemia   • Hyperlipidemia   • Vitamin D deficiency   • History of Jaycee-en-Y gastric bypass   • Postsurgical malabsorption   • Iron deficiency anemia secondary to inadequate dietary iron intake   • B12 deficiency   • Hiatal hernia   • Pelvic pain   • Obesity, Class III, BMI 40-49 9 (morbid obesity) (HCC)   • SOB (shortness of breath) on exertion   • Diarrhea   • Gastric outlet obstruction   • Vertigo   • Menorrhagia with regular cycle   • Thyroid nodule   • Other chest pain   • EKG, abnormal   • Situational anxiety   • Generalized anxiety disorder   • Nasal congestion   • Neck pain   • Abnormal vaginal bleeding   • Other dysphagia   • Encounter for well adult exam with abnormal findings   • Immunization refused   • Breast pain   • Exposure to COVID-19 virus   • History of COVID-19   • Breast discharge   • RUQ pain   • Chronic left-sided low back pain with left-sided sciatica   • Chronic diarrhea   • Acute calculous cholecystitis   • S/P cholecystectomy   • Mass of upper outer quadrant of right breast   • Amenorrhea   • Strain of left trapezius muscle   • Atypical nevi       Past Medical History:   Diagnosis Date   • Anemia    • BMI 36 0-36 9,adult 12/8/2020   • Cough 12/21/2020   • COVID-19 virus infection 12/23/2020   • Disease of thyroid gland    • Dizziness     due to anemia, last episode August 2020, no falls   • Drop in hemoglobin 12/8/2020   • Fatigue    • History of transfusion 2016    anemic - loss of blood due to bleeding ulcers, no reaction   • Hyperlipidemia    • Hypertension    • Multiple gastric ulcers    • Obesity     11/14/16   • Severe obesity (BMI 35 0-39  9) with comorbidity ([facility] [address] ) 2020   • Sore throat 10/27/2021   • Thyroid disease     took synthroid but not currently taking   • Thyroid mass 3/15/2021     A CT scan of the neck incidental finding 1 7 cm of the right thyroid   • Ulcer        Past Surgical History:   Procedure Laterality Date   • CHOLECYSTECTOMY     • CHOLECYSTECTOMY LAPAROSCOPIC N/A 06/10/2022    Procedure: CHOLECYSTECTOMY LAPAROSCOPIC;  Surgeon: Clifton Muller MD;  Location: [address] [place] [address] MAIN OR;  Service: General   • GASTRIC BYPASS     • GASTRIC BYPASS LAPAROSCOPIC N/A 12/15/2020    Procedure: Robotic lysis of adhesions, small-bowel resection, partial gastrectomy, paraesophageal hernia repair, bilateral truncal vagotomy; Robotic gastrojejunostomy anastomosis with intraop endoscopy;  Surgeon: Mike Grissom MD;  Location: [facility] OR;  Service: Bariatrics   • LITO-EN-Y PROCEDURE  2016    Gastric Bypass by Dr Dallin Lim Weight 339 6,nw   • TUBAL LIGATION Bilateral        • WISDOM TOOTH EXTRACTION         Family History   Problem Relation Age of Onset   • Asthma Mother    • Coronary artery disease Mother    • Diabetes Mother         MELLITUS   • Hyperlipidemia Mother    • Hypertension Mother    • Aneurysm Mother         2018 just diagnosed with two   • No Known Problems Father          when she was 1 months old   • No Known Problems Sister    • No Known Problems Daughter    • No Known Problems Daughter    • Aneurysm Maternal Grandmother    • Aneurysm Maternal Grandfather           of a ruptured abdominal aneurysm   • No Known Problems Paternal Grandmother    • No Known Problems Paternal Grandfather    • No Known Problems Maternal Aunt    • No Known Problems Maternal Aunt    • No Known Problems Maternal Aunt        Social History     Socioeconomic History   • Marital status: /Civil Union     Spouse name: None   • Number of children: None   • Years of education: None   • Highest education level: None Occupational History   • None   Tobacco Use   • Smoking status: Every Day     Packs/day: 0 25     Years: 14 00     Pack years: 3 50     Types: Cigarettes     Start date: 2004     Last attempt to quit: 2018     Years since quittin 2     Passive exposure: Never   • Smokeless tobacco: Former   • Tobacco comments:     Started again 3/2023, 1 cig per day   Vaping Use   • Vaping Use: Never used   Substance and Sexual Activity   • Alcohol use: No   • Drug use: No   • Sexual activity: Yes     Partners: Male     Birth control/protection: Female Sterilization   Other Topics Concern   • None   Social History Narrative    fhx not asked in triage     Social Determinants of Health     Financial Resource Strain: Not on file   Food Insecurity: Not on file   Transportation Needs: Not on file   Physical Activity: Not on file   Stress: Not on file   Social Connections: Not on file   Intimate Partner Violence: Not on file   Housing Stability: Not on file         Current Outpatient Medications:   •  albuterol (2 5 mg/3 mL) 0 083 % nebulizer solution, Take 3 mL (2 5 mg total) by nebulization every 6 (six) hours as needed for wheezing or shortness of breath, Disp: 180 mL, Rfl: 5  •  albuterol (PROVENTIL HFA,VENTOLIN HFA) 90 mcg/act inhaler, INHALE 2 PUFFS BY MOUTH EVERY 4 HOURS AS NEEDED FOR WHEEZING, Disp: 8 5 g, Rfl: 2  •  Blood Pressure Monitoring (Blood Pressure Monitor/Arm) KT, Use 2 (two) times a day, Disp: 1 each, Rfl: 0  •  budesonide-formoterol (Symbicort) 160-4 5 mcg/act inhaler, Inhale 2 puffs 2 (two) times a day Rinse mouth after use , Disp: 10 2 g, Rfl: 0  •  Cholecalciferol 50 MCG (2000 UT) CAPS, Take 1 capsule (2,000 Units total) by mouth in the morning, Disp: 90 capsule, Rfl: 0  •  ergocalciferol (VITAMIN D2) 50,000 units, TAKE 1 CAPSULE BY MOUTH 2 TIMES A WEEK, Disp: 25 capsule, Rfl: 0  •  FeroSul 325 (65 Fe) MG tablet, TAKE 1 TABLET BY MOUTH TWICE DAILY WITH MEALS, Disp: 180 tablet, Rfl: 0  •  losartan (COZAAR) 100 MG tablet, Take 25 mg by mouth daily, Disp: , Rfl:   •  methocarbamol (ROBAXIN) 500 mg tablet, Take 1 tablet (500 mg total) by mouth 3 (three) times a day for 10 days, Disp: 30 tablet, Rfl: 0  •  Multiple Vitamin (MULTIVITAMIN) tablet, Take 1 tablet by mouth daily, Disp: , Rfl:   •  NIFEdipine ER (ADALAT CC) 30 MG 24 hr tablet, Take 1 tablet (30 mg total) by mouth daily, Disp: 90 tablet, Rfl: 0  •  olmesartan-hydrochlorothiazide (BENICAR HCT) 40-25 MG per tablet, Take 1 tablet by mouth daily, Disp: 30 tablet, Rfl: 2  •  Zinc 100 MG TABS, Take by mouth, Disp: , Rfl:     Allergies   Allergen Reactions   • Peterman Oil - Food Allergy Shortness Of Breath   • Venofer [Iron Sucrose]      Chest pressure after 1st dose  Tachycardia and blurred vision after 8 minutes of dose #2  Objective:  /64 (BP Location: Left arm, Patient Position: Sitting, Cuff Size: Large)   Pulse 74   Temp 98 1 °F (36 7 °C) (Temporal)   Resp 18   Ht 5' 4 5" (1 638 m)   Wt 118 kg (260 lb)   SpO2 100%   BMI 43 94 kg/m²   Physical Exam  Constitutional:       General: She is not in acute distress  Appearance: Normal appearance  She is not ill-appearing  HENT:      Head: Normocephalic and atraumatic  Eyes:      Extraocular Movements: Extraocular movements intact  Conjunctiva/sclera: Conjunctivae normal       Pupils: Pupils are equal, round, and reactive to light  Cardiovascular:      Rate and Rhythm: Normal rate and regular rhythm  Pulses: Normal pulses  Heart sounds: Normal heart sounds  Pulmonary:      Effort: Pulmonary effort is normal  No respiratory distress  Breath sounds: Normal breath sounds  Abdominal:      General: Bowel sounds are normal  There is no distension  Palpations: Abdomen is soft  Tenderness: There is no abdominal tenderness  Musculoskeletal:         General: Normal range of motion  Cervical back: Normal range of motion  No tenderness        Right lower leg: No edema  Left lower leg: No edema  Lymphadenopathy:      Cervical: No cervical adenopathy  Skin:     General: Skin is warm and dry  Capillary Refill: Capillary refill takes less than 2 seconds  Coloration: Skin is not jaundiced  Neurological:      General: No focal deficit present  Mental Status: She is alert and oriented to person, place, and time  Mental status is at baseline  Cranial Nerves: No cranial nerve deficit  Motor: No weakness  Gait: Gait normal    Psychiatric:         Mood and Affect: Mood normal          Behavior: Behavior normal          Thought Content: Thought content normal          Judgment: Judgment normal          Result Review  Labs:   Appointment on 03/01/2023   Component Date Value Ref Range Status   • Zinc 03/01/2023 61  44 - 115 ug/dL Final                                    Detection Limit = 5   • Vit D, 25-Hydroxy 03/01/2023 20 1 (L)  30 0 - 100 0 ng/mL Final   • Vitamin B-12 03/01/2023 203  100 - 900 pg/mL Final   • Vitamin B1, Whole Blood 03/01/2023 128 6  66 5 - 200 0 nmol/L Final   • Vitamin A 03/01/2023 23 7  18 9 - 57 3 ug/dL Final    Reference intervals for vitamin A determined from [facility] internal  studies  Individuals with vitamin A less than 20 ug/dL are considered  vitamin A deficient and those with serum concentrations less than  10 ug/dL are considered severely deficient  This test was developed and its performance characteristics  determined by [facility]  It has not been cleared or approved  by the Food and Drug Administration     • PTH 03/01/2023 124 6 (H)  18 4 - 80 1 pg/mL Final   • WBC 03/01/2023 8 09  4 31 - 10 16 Thousand/uL Final   • RBC 03/01/2023 3 88  3 81 - 5 12 Million/uL Final   • Hemoglobin 03/01/2023 9 5 (L)  11 5 - 15 4 g/dL Final   • Hematocrit 03/01/2023 32 5 (L)  34 8 - 46 1 % Final   • MCV 03/01/2023 84  82 - 98 fL Final   • MCH 03/01/2023 24 5 (L)  26 8 - 34 3 pg Final   • MCHC 03/01/2023 29 2 (L)  31 4 - 37 4 g/dL Final   • RDW 03/01/2023 17 5 (H)  11 6 - 15 1 % Final   • Platelets 29/96/4821 346  149 - 390 Thousands/uL Final   • MPV 03/01/2023 11 0  8 9 - 12 7 fL Final   • Sodium 03/01/2023 139  135 - 147 mmol/L Final   • Potassium 03/01/2023 4 5  3 5 - 5 3 mmol/L Final   • Chloride 03/01/2023 108  96 - 108 mmol/L Final   • CO2 03/01/2023 25  21 - 32 mmol/L Final   • ANION GAP 03/01/2023 6  5 - 14 mmol/L Final   • BUN 03/01/2023 12  5 - 25 mg/dL Final   • Creatinine 03/01/2023 0 55 (L)  0 60 - 1 20 mg/dL Final    Standardized to IDMS reference method   • Glucose, Fasting 03/01/2023 85  70 - 99 mg/dL Final    Specimen collection should occur prior to Sulfasalazine administration due to the potential for falsely depressed results  Specimen collection should occur prior to Sulfapyridine administration due to the potential for falsely elevated results  • Calcium 03/01/2023 8 4  8 4 - 10 2 mg/dL Final   • AST 03/01/2023 24  14 - 36 U/L Final    Specimen collection should occur prior to Sulfasalazine administration due to the potential for falsely depressed results  • ALT 03/01/2023 15  <35 U/L Final    Specimen collection should occur prior to Sulfasalazine administration due to the potential for falsely depressed results  • Alkaline Phosphatase 03/01/2023 96  43 - 122 U/L Final   • Total Protein 03/01/2023 7 4  6 4 - 8 4 g/dL Final   • Albumin 03/01/2023 4 0  3 5 - 5 0 g/dL Final   • Total Bilirubin 03/01/2023 0 28  0 20 - 1 00 mg/dL Final   • eGFR 03/01/2023 121  ml/min/1 73sq m Final   • Ferritin 03/01/2023 5 (L)  8 - 388 ng/mL Final   • Folate 03/01/2023 17 6 (H)  3 1 - 17 5 ng/mL Final   • Iron Saturation 03/01/2023 8 (L)  15 - 50 % Final   • TIBC 03/01/2023 456 (H)  250 - 450 ug/dL Final   • Iron 03/01/2023 38 (L)  50 - 170 ug/dL Final    Patients treated with metal-binding drugs (ie  Deferoxamine) may have depressed iron values  Imaging: No relevant imaging to review     Please note:   This report has been generated by a voice recognition software system  Therefore there may be syntax, spelling, and/or grammatical errors  Please call if you have any questions

## 2023-06-30 ENCOUNTER — TELEPHONE (OUTPATIENT)
Dept: PRIMARY CARE CLINIC | Age: 43
End: 2023-06-30

## 2023-07-03 ENCOUNTER — TELEMEDICINE (OUTPATIENT)
Dept: PRIMARY CARE CLINIC | Age: 43
End: 2023-07-03
Payer: COMMERCIAL

## 2023-07-03 ENCOUNTER — TELEPHONE (OUTPATIENT)
Dept: PRIMARY CARE CLINIC | Age: 43
End: 2023-07-03

## 2023-07-03 ENCOUNTER — PROCEDURE VISIT (OUTPATIENT)
Dept: PRIMARY CARE CLINIC | Age: 43
End: 2023-07-03

## 2023-07-03 VITALS
HEART RATE: 108 BPM | HEIGHT: 60 IN | DIASTOLIC BLOOD PRESSURE: 78 MMHG | TEMPERATURE: 97.9 F | SYSTOLIC BLOOD PRESSURE: 108 MMHG | WEIGHT: 147.2 LBS | BODY MASS INDEX: 28.9 KG/M2 | OXYGEN SATURATION: 99 %

## 2023-07-03 DIAGNOSIS — Z01.419 WELL WOMAN EXAM: ICD-10-CM

## 2023-07-03 DIAGNOSIS — Z13.1 DIABETES MELLITUS SCREENING: ICD-10-CM

## 2023-07-03 DIAGNOSIS — J30.1 SEASONAL ALLERGIC RHINITIS DUE TO POLLEN: ICD-10-CM

## 2023-07-03 DIAGNOSIS — G89.29 CHRONIC SI JOINT PAIN: Chronic | ICD-10-CM

## 2023-07-03 DIAGNOSIS — Z01.30 BLOOD PRESSURE CHECK: Primary | ICD-10-CM

## 2023-07-03 DIAGNOSIS — E03.9 HYPOTHYROIDISM, UNSPECIFIED TYPE: Primary | ICD-10-CM

## 2023-07-03 DIAGNOSIS — F41.9 ANXIETY: Chronic | ICD-10-CM

## 2023-07-03 DIAGNOSIS — Z13.220 SCREENING CHOLESTEROL LEVEL: ICD-10-CM

## 2023-07-03 DIAGNOSIS — G47.00 INSOMNIA, UNSPECIFIED TYPE: ICD-10-CM

## 2023-07-03 DIAGNOSIS — E55.9 VITAMIN D DEFICIENCY: Chronic | ICD-10-CM

## 2023-07-03 DIAGNOSIS — Z23 IMMUNIZATION DUE: ICD-10-CM

## 2023-07-03 DIAGNOSIS — M53.3 CHRONIC SI JOINT PAIN: Chronic | ICD-10-CM

## 2023-07-03 PROBLEM — R10.9 ACUTE RIGHT FLANK PAIN: Status: RESOLVED | Noted: 2019-07-25 | Resolved: 2023-07-03

## 2023-07-03 PROBLEM — Z79.899 LONG-TERM USE OF HIGH-RISK MEDICATION: Status: RESOLVED | Noted: 2018-08-07 | Resolved: 2023-07-03

## 2023-07-03 PROCEDURE — 90471 IMMUNIZATION ADMIN: CPT | Performed by: FAMILY MEDICINE

## 2023-07-03 PROCEDURE — 90715 TDAP VACCINE 7 YRS/> IM: CPT | Performed by: FAMILY MEDICINE

## 2023-07-03 PROCEDURE — 99214 OFFICE O/P EST MOD 30 MIN: CPT | Performed by: FAMILY MEDICINE

## 2023-07-03 RX ORDER — CLONIDINE HYDROCHLORIDE 0.1 MG/1
0.1 TABLET ORAL 3 TIMES DAILY PRN
Qty: 90 TABLET | Refills: 0 | Status: SHIPPED | OUTPATIENT
Start: 2023-07-03 | End: 2023-08-02

## 2023-07-03 RX ORDER — GABAPENTIN 300 MG/1
300 CAPSULE ORAL 3 TIMES DAILY
Qty: 270 CAPSULE | Refills: 0 | Status: SHIPPED | OUTPATIENT
Start: 2023-07-03 | End: 2023-10-01

## 2023-07-03 RX ORDER — GABAPENTIN 300 MG/1
300 CAPSULE ORAL 3 TIMES DAILY
COMMUNITY
Start: 2023-06-03 | End: 2023-07-03 | Stop reason: SDUPTHER

## 2023-07-03 RX ORDER — HYDROXYZINE 50 MG/1
50 TABLET, FILM COATED ORAL DAILY PRN
Qty: 30 TABLET | Refills: 0 | Status: SHIPPED | OUTPATIENT
Start: 2023-07-03 | End: 2023-08-02

## 2023-07-03 RX ORDER — HYDROXYZINE 50 MG/1
50 TABLET, FILM COATED ORAL DAILY PRN
COMMUNITY
End: 2023-07-03 | Stop reason: SDUPTHER

## 2023-07-03 RX ORDER — FAMOTIDINE 10 MG
10 TABLET ORAL 2 TIMES DAILY PRN
COMMUNITY

## 2023-07-03 RX ORDER — CLONIDINE HYDROCHLORIDE 0.1 MG/1
TABLET ORAL
COMMUNITY
Start: 2023-06-03 | End: 2023-07-03 | Stop reason: SDUPTHER

## 2023-07-03 RX ORDER — BUPROPION HYDROCHLORIDE 300 MG/1
300 TABLET ORAL EVERY MORNING
COMMUNITY
Start: 2023-06-03

## 2023-07-03 RX ORDER — FLUTICASONE PROPIONATE 50 MCG
SPRAY, SUSPENSION (ML) NASAL
Qty: 16 G | Refills: 1 | Status: SHIPPED | OUTPATIENT
Start: 2023-07-03

## 2023-07-03 SDOH — ECONOMIC STABILITY: HOUSING INSECURITY
IN THE LAST 12 MONTHS, WAS THERE A TIME WHEN YOU DID NOT HAVE A STEADY PLACE TO SLEEP OR SLEPT IN A SHELTER (INCLUDING NOW)?: NO

## 2023-07-03 SDOH — ECONOMIC STABILITY: FOOD INSECURITY: WITHIN THE PAST 12 MONTHS, THE FOOD YOU BOUGHT JUST DIDN'T LAST AND YOU DIDN'T HAVE MONEY TO GET MORE.: NEVER TRUE

## 2023-07-03 SDOH — ECONOMIC STABILITY: FOOD INSECURITY: WITHIN THE PAST 12 MONTHS, YOU WORRIED THAT YOUR FOOD WOULD RUN OUT BEFORE YOU GOT MONEY TO BUY MORE.: NEVER TRUE

## 2023-07-03 SDOH — ECONOMIC STABILITY: INCOME INSECURITY: HOW HARD IS IT FOR YOU TO PAY FOR THE VERY BASICS LIKE FOOD, HOUSING, MEDICAL CARE, AND HEATING?: NOT HARD AT ALL

## 2023-07-03 ASSESSMENT — PATIENT HEALTH QUESTIONNAIRE - PHQ9
1. LITTLE INTEREST OR PLEASURE IN DOING THINGS: 0
SUM OF ALL RESPONSES TO PHQ9 QUESTIONS 1 & 2: 0
SUM OF ALL RESPONSES TO PHQ QUESTIONS 1-9: 0
2. FEELING DOWN, DEPRESSED OR HOPELESS: 0
SUM OF ALL RESPONSES TO PHQ QUESTIONS 1-9: 0

## 2023-07-03 ASSESSMENT — ENCOUNTER SYMPTOMS
COUGH: 0
SHORTNESS OF BREATH: 0

## 2023-07-03 NOTE — ASSESSMENT & PLAN NOTE
Unclear level of control. Will check labs (TSH Reflex) at her request (counseling done that only TSH needed). Some tachycardia on exam today - hydration encouraged (none today by her report).

## 2023-07-03 NOTE — ASSESSMENT & PLAN NOTE
Poorly controlled. Refilled gabapentin today. She asked for Pain Management but [facility] (where we can refer to) is too far. Advised to call her insurance, get a convenient place and let us know if a referral is needed. Also dicussed that if she establishes with pain medicine they will take over gabapentin.

## 2023-07-03 NOTE — ASSESSMENT & PLAN NOTE
Well controlled. Denied SI/HI today, PHQ2 score of 0. MDQ negative. Follows with psych. Extensive conversation today that I do not prescribe clonidine for anxiety and should she desire this, she would need to do this with psych. Given risk of rebound HTN, Rx provided for 30 days, she is aware I will not fill this long-term.

## 2023-07-03 NOTE — PATIENT INSTRUCTIONS
Please find our [facility] below for your convenience! CENTRAL LOCATIONS    1) [address], [address] [facility], [address]  Phone: [phone]    2) [address]  [place], [address] [facility]  Phone: [address]    3) [address] [facility]  [place], [address]  Phone: [phone]    4) [facility]  [address], [address]  Phone: [phone]    5) [facility] [address]  [place], [address]  Phone: [phone]    6) [address] [place] [address], [address], [address]  Phone: [phone]    7) [address] [facility] [address]  [facility], [address]  Phone: [phone]) [facility] [address]  [address]  [place], [address]  Phone: [phone]) [facility]  [address] [facility], [address] [facility] [address] [place], [address] [facility]  Phone: [facility]    10) [facility]  [address] [facility] [facility] [address], [address], [address]  Phone: [phone]) [address]  3/3/2003 [address] [facility], [address] [facility],  [address], [address] [facility]  Phone: 130 [facility] [place]    12) [address]  [address], [address], [address]  Phone: [phone]    13) [facility]  [address] [facility]  Phone: [phone]

## 2023-07-03 NOTE — ASSESSMENT & PLAN NOTE
Well controlled. Refilled hydroxyzine for 30 days. Advised that if she remains on clonidine, psych would have to take over this medication. She stated she understood.

## 2023-07-03 NOTE — PROGRESS NOTES
hydroxyzine for 30 days. Advised that if she remains on clonidine, psych would have to take over this medication. She stated she understood. Orders:  -     hydrOXYzine HCl (ATARAX) 50 MG tablet; Take 1 tablet by mouth daily as needed (sleep), Disp-30 tablet, R-0Normal  10. Immunization due  Assessment & Plan:  Age appropriate immunization provided as per orders below. Orders:  -     Lucille De Luna, (age 8 yrs+), IM    I attest that on 07/03/23 , I spent a total of 39 minutes, in addition to face-to-face time during the visit, reviewing the patient's records and labs with the patient/guardian in the visit, counseling the patient/guardian on the above noted plan, ordering medications, ordering blood work and/or urine, and documenting the encounter after the visit. --David Dalton MD on 7/3/2023 at 12:15 PM    An electronic signature was used to authenticate this note.

## 2023-07-26 DIAGNOSIS — G47.00 INSOMNIA, UNSPECIFIED TYPE: ICD-10-CM

## 2023-07-26 DIAGNOSIS — F41.9 ANXIETY: Chronic | ICD-10-CM

## 2023-07-27 ENCOUNTER — TELEPHONE (OUTPATIENT)
Dept: PRIMARY CARE CLINIC | Age: 43
End: 2023-07-27

## 2023-07-27 RX ORDER — HYDROXYZINE 50 MG/1
50 TABLET, FILM COATED ORAL DAILY PRN
Qty: 30 TABLET | Refills: 0 | OUTPATIENT
Start: 2023-07-27 | End: 2023-08-26

## 2023-07-27 RX ORDER — CLONIDINE HYDROCHLORIDE 0.1 MG/1
0.1 TABLET ORAL 3 TIMES DAILY PRN
Qty: 90 TABLET | Refills: 0 | OUTPATIENT
Start: 2023-07-27 | End: 2023-08-26

## 2023-07-27 NOTE — TELEPHONE ENCOUNTER
Received Rx request for clonidine and hydroxyzine - see previous documentation in my office note from 7/3/2023. Called patient about this and to discuss next steps, no answer received, message left asking for call back. Rx refused today.

## 2023-08-02 PROBLEM — Z13.220 SCREENING CHOLESTEROL LEVEL: Status: RESOLVED | Noted: 2023-07-03 | Resolved: 2023-08-02

## 2023-08-02 PROBLEM — Z01.419 WELL WOMAN EXAM: Status: RESOLVED | Noted: 2023-07-03 | Resolved: 2023-08-02

## 2023-08-02 PROBLEM — Z13.1 DIABETES MELLITUS SCREENING: Status: RESOLVED | Noted: 2023-07-03 | Resolved: 2023-08-02

## 2023-09-05 DIAGNOSIS — G89.29 CHRONIC SI JOINT PAIN: Chronic | ICD-10-CM

## 2023-09-05 DIAGNOSIS — M53.3 CHRONIC SI JOINT PAIN: Chronic | ICD-10-CM

## 2023-09-06 RX ORDER — GABAPENTIN 300 MG/1
300 CAPSULE ORAL 3 TIMES DAILY
Qty: 270 CAPSULE | Refills: 0 | Status: SHIPPED | OUTPATIENT
Start: 2023-09-06 | End: 2023-12-05

## 2023-09-26 DIAGNOSIS — Z13.220 SCREENING CHOLESTEROL LEVEL: ICD-10-CM

## 2023-09-26 DIAGNOSIS — E55.9 VITAMIN D DEFICIENCY: Chronic | ICD-10-CM

## 2023-09-26 DIAGNOSIS — E03.9 HYPOTHYROIDISM, UNSPECIFIED TYPE: ICD-10-CM

## 2023-09-26 DIAGNOSIS — Z13.1 DIABETES MELLITUS SCREENING: ICD-10-CM

## 2023-09-26 LAB
25(OH)D3 SERPL-MCNC: 26.9 NG/ML
ALBUMIN SERPL-MCNC: 4.6 G/DL (ref 3.4–5)
ALBUMIN/GLOB SERPL: 1.4 {RATIO} (ref 1.1–2.2)
ALP SERPL-CCNC: 110 U/L (ref 40–129)
ALT SERPL-CCNC: 9 U/L (ref 10–40)
ANION GAP SERPL CALCULATED.3IONS-SCNC: 19 MMOL/L (ref 3–16)
AST SERPL-CCNC: 14 U/L (ref 15–37)
BILIRUB SERPL-MCNC: 0.4 MG/DL (ref 0–1)
BUN SERPL-MCNC: 15 MG/DL (ref 7–20)
CALCIUM SERPL-MCNC: 9.6 MG/DL (ref 8.3–10.6)
CHLORIDE SERPL-SCNC: 105 MMOL/L (ref 99–110)
CHOLEST SERPL-MCNC: 246 MG/DL (ref 0–199)
CO2 SERPL-SCNC: 18 MMOL/L (ref 21–32)
CREAT SERPL-MCNC: 1.1 MG/DL (ref 0.6–1.1)
GFR SERPLBLD CREATININE-BSD FMLA CKD-EPI: >60 ML/MIN/{1.73_M2}
GLUCOSE SERPL-MCNC: 118 MG/DL (ref 70–99)
HDLC SERPL-MCNC: 55 MG/DL (ref 40–60)
LDLC SERPL CALC-MCNC: 170 MG/DL
POTASSIUM SERPL-SCNC: 5 MMOL/L (ref 3.5–5.1)
PROT SERPL-MCNC: 7.8 G/DL (ref 6.4–8.2)
SODIUM SERPL-SCNC: 142 MMOL/L (ref 136–145)
T4 FREE SERPL-MCNC: 0.8 NG/DL (ref 0.9–1.8)
TRIGL SERPL-MCNC: 106 MG/DL (ref 0–150)
TSH SERPL DL<=0.005 MIU/L-ACNC: 32.16 UIU/ML (ref 0.27–4.2)
VLDLC SERPL CALC-MCNC: 21 MG/DL

## 2023-09-27 DIAGNOSIS — E03.9 HYPOTHYROIDISM, UNSPECIFIED TYPE: Primary | ICD-10-CM

## 2023-09-27 LAB
EST. AVERAGE GLUCOSE BLD GHB EST-MCNC: 116.9 MG/DL
HBA1C MFR BLD: 5.7 %

## 2023-09-27 RX ORDER — LEVOTHYROXINE SODIUM 0.07 MG/1
75 TABLET ORAL DAILY
Qty: 60 TABLET | Refills: 0 | Status: SHIPPED | OUTPATIENT
Start: 2023-09-27 | End: 2023-11-26

## 2023-11-08 ENCOUNTER — TELEPHONE (OUTPATIENT)
Dept: PRIMARY CARE CLINIC | Age: 43
End: 2023-11-08

## 2023-11-08 NOTE — TELEPHONE ENCOUNTER
----- Message from Radhika Fofana MD sent at 11/8/2023  2:52 PM EST -----  Help with this please and thank you!  ----- Message -----  From: Radhika Fofana MD  Sent: 11/8/2023   7:00 AM EST  To: Radhika Fofana MD    Call patient to remind her to do her TSH - ordered and in chart - not fasting. Please and thank you!

## 2023-11-15 ENCOUNTER — TELEPHONE (OUTPATIENT)
Dept: PRIMARY CARE CLINIC | Age: 43
End: 2023-11-15

## 2023-11-15 NOTE — TELEPHONE ENCOUNTER
----- Message from Pearl Alvarez MD sent at 11/15/2023  8:35 AM EST -----  Regarding: FW:  Can you please help me with this? Thank you!  ----- Message -----  From: Pearl Alvarez MD  Sent: 11/8/2023   7:00 AM EST  To: Pearl Alvarez MD    Call patient to remind her to do her TSH - ordered and in chart - not fasting. Please and thank you!

## 2023-11-15 NOTE — TELEPHONE ENCOUNTER
Tried calling pt x3 and automated recording comes on saying, \"The person you are trying to reach is not accepting calls at this time\"

## 2023-11-27 ENCOUNTER — TELEPHONE (OUTPATIENT)
Dept: PRIMARY CARE CLINIC | Age: 43
End: 2023-11-27

## 2023-11-27 NOTE — TELEPHONE ENCOUNTER
----- Message from Grazyna Reed MD sent at 11/27/2023  7:50 AM EST -----  Help with this please?  ----- Message -----  From: Grazyna Reed MD  Sent: 11/8/2023   7:00 AM EST  To: Grazyna Reed MD    Call patient to remind her to do her TSH - ordered and in chart - not fasting. Please and thank you!

## 2023-12-05 ENCOUNTER — TELEPHONE (OUTPATIENT)
Dept: PRIMARY CARE CLINIC | Age: 43
End: 2023-12-05

## 2023-12-05 NOTE — TELEPHONE ENCOUNTER
----- Message from Fide Sagastume MD sent at 12/5/2023  8:22 AM EST -----  Help with this?  ----- Message -----  From: Fide Sagastume MD  Sent: 11/8/2023   7:00 AM EST  To: Fide Sagastume MD    Call patient to remind her to do her TSH - ordered and in chart - not fasting. Please and thank you!

## 2024-01-09 DIAGNOSIS — G47.00 INSOMNIA, UNSPECIFIED TYPE: ICD-10-CM

## 2024-01-09 RX ORDER — HYDROXYZINE 50 MG/1
50 TABLET, FILM COATED ORAL DAILY PRN
Qty: 30 TABLET | Refills: 0 | OUTPATIENT
Start: 2024-01-09 | End: 2024-02-08

## 2024-01-22 ENCOUNTER — TELEPHONE (OUTPATIENT)
Dept: PRIMARY CARE CLINIC | Age: 44
End: 2024-01-22

## 2024-01-22 NOTE — TELEPHONE ENCOUNTER
----- Message from Magalis Cole MD sent at 1/22/2024  7:34 AM EST -----  Help with this and if no answer please send [employer] msg. Thank you!  ----- Message -----  From: Magalis Cole MD  Sent: 11/8/2023   7:00 AM EST  To: Magalis Cole MD    Call patient to remind her to do her TSH - ordered and in chart - not fasting. Please and thank you!

## 2024-02-21 ENCOUNTER — TELEPHONE (OUTPATIENT)
Dept: PRIMARY CARE CLINIC | Age: 44
End: 2024-02-21

## 2024-02-21 NOTE — TELEPHONE ENCOUNTER
Pt called stating her  had picked up her rx for Zolpidem from her pharmacy and accidentally threw it away.  Pt sts she attempted to call her psychiatrist who is the prescriber but pt sts he is out of the office.  Pt requested Dr Cole to refill the medicine.    Message was discussed with Dr Cole and as per discussion with Dr Cole, advised pt that Dr Cole does not prescribe Zolpidem and she will have to call the psychiatrist's office and see what they can do for her.